# Patient Record
Sex: FEMALE | Race: WHITE | NOT HISPANIC OR LATINO | Employment: FULL TIME | URBAN - METROPOLITAN AREA
[De-identification: names, ages, dates, MRNs, and addresses within clinical notes are randomized per-mention and may not be internally consistent; named-entity substitution may affect disease eponyms.]

---

## 2019-12-17 ENCOUNTER — TELEPHONE (OUTPATIENT)
Dept: OTHER | Facility: OUTPATIENT CENTER | Age: 24
End: 2019-12-17

## 2019-12-17 NOTE — TELEPHONE ENCOUNTER
[facility] Telephone Intake    Date:  2019  Client Name:  Silvia Fritz  Preferred Name: Isaiah    MRN:  [id number]   :  1995       Age:  24 year old     Presenting Problem / Reason for Assessment   (Clinical History &Symptoms): ongoing support and gender therapy     Suggested Program:  TG    Length of time experiencing Symptoms: Since childhood     Seen Other Providers (if so, where):  M.D. : None   Therapist:  None currently but has seen therapist in the past   Psychiatrist:  None currently     Referral Source: Li Lion    Follow Up:    Insurance Benefits to be evaluated.  Note will be entered when validated.    Patient wishes to be contacted regarding Insurance benefits: Yes  Please Verify Registration    Please send Welcome Packet and document date sent.

## 2019-12-18 NOTE — TELEPHONE ENCOUNTER
.Per: [place]/ [place]  Copay$ 0   Ded$ 0; Met$ 0   Coins 20%    Out of Pocket Max$ 7,150 ; Met$ 688.75     Psych testing require auth (90921/23571)? no  Extended therapy require auth (21603)?  no    Exclusions:   Family Therapy (20944/67132)  NO    Marriage couple counseling  YES   Transgender/Gender Dysphoria no   CSB no   Sexual dysfunction no    Patient Contacted about benefits:  LEFT VOICEMAIL  Date contacted: 12/18/2019

## 2020-01-16 ENCOUNTER — OFFICE VISIT (OUTPATIENT)
Dept: OTHER | Facility: OUTPATIENT CENTER | Age: 25
End: 2020-01-16
Payer: COMMERCIAL

## 2020-01-16 DIAGNOSIS — F33.1 MAJOR DEPRESSIVE DISORDER, RECURRENT EPISODE, MODERATE (H): ICD-10-CM

## 2020-01-16 DIAGNOSIS — F64.0 GENDER DYSPHORIA IN ADOLESCENT AND ADULT: Primary | ICD-10-CM

## 2020-01-16 DIAGNOSIS — F84.0 AUTISM SPECTRUM: ICD-10-CM
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
## 2020-01-28 ENCOUNTER — OFFICE VISIT (OUTPATIENT)
Dept: OTHER | Facility: OUTPATIENT CENTER | Age: 25
End: 2020-01-28
Payer: COMMERCIAL

## 2020-01-28 DIAGNOSIS — F64.0 GENDER DYSPHORIA IN ADOLESCENT AND ADULT: Primary | ICD-10-CM

## 2020-01-28 ASSESSMENT — ANXIETY QUESTIONNAIRES
6. BECOMING EASILY ANNOYED OR IRRITABLE: SEVERAL DAYS
1. FEELING NERVOUS, ANXIOUS, OR ON EDGE: SEVERAL DAYS
3. WORRYING TOO MUCH ABOUT DIFFERENT THINGS: SEVERAL DAYS
7. FEELING AFRAID AS IF SOMETHING AWFUL MIGHT HAPPEN: NOT AT ALL
GAD7 TOTAL SCORE: 4
2. NOT BEING ABLE TO STOP OR CONTROL WORRYING: NOT AT ALL
5. BEING SO RESTLESS THAT IT IS HARD TO SIT STILL: SEVERAL DAYS

## 2020-01-28 ASSESSMENT — PATIENT HEALTH QUESTIONNAIRE - PHQ9: 5. POOR APPETITE OR OVEREATING: NOT AT ALL

## 2020-01-28 NOTE — PROGRESS NOTES
[facility] -  Case Progress Note    Date of Service: 20   Name: Silvia Fritz  Preferred name: Isaiah; he/him  : 1995  Medical Record Number: [id number]  Treating Provider: Saira Whitmore, PhD - Postdoctoral Fellow   Type of Session: Individual  Present in Session: Client   Number of Minutes:  55    Current Symptoms/Status:  Incongruence between experienced/expressed gender and birth assigned sex, lasting longer than six months and causing clinically significant distress, with endorsement of the following symptoms:   - marked incongruence between experience/expressed gender (male) and primary and secondary sex characteristics  - strong desire to be rid of birth assigned sex characteristics  - strong desire for secondary sex characteristics of gender other than birth assigned sex (male)  - strong desire to be read and treated as gender other than birth assigned sex (male)  - strong conviction that his feelings/experiences are typical of gender other than birth assigned sex      History of recurrent major depression, with current endorsement of symptoms in the mildly depressed range  History of Autism Spectrum Disorder, diagnosed at 15.        Progress Toward Treatment Goals:   Collaborative identification of treatment goals     Intervention: Modality and Description:  Collaborative approach to treatment planning and identification of goals. Isaiah was in agreement with diagnoses given based on assessment. He was able to clearly articulate his goals of first working with family towards improved communication around gender and support of his transition, and subsequently moving toward consultation with Dr. Cruz.   Primary interventions were psychoeducation, CBT, and supportive therapy towards Isaiah's gender goals. Clinician offered initial education on testosterone therapy, and process of medical consult with sexual medicine. Isaiah identified lack of family support as a significant  barrier, and one that he wishes to address prior to medical intervention. Isaiah further described family dynamics, and his perception of parental support/non-support. Clinician offered family session, and Isaiah is in agreement, with goals of 1) providing resources and education to parents, and 2) communicating his needs to parents about how to best support him. Isaiah has some concerns about coming out at work. Clinician shared resources through [facility] and [facility]. Isaiah was responsive to intervention.    Assignment:  Talk to parents about family session    DSM-5 Diagnoses:  302.85 - Gender Dysphoria in Adolescents and Adults    Plan/Need for Future Services:  Return for therapy in two weeks to treat diagnosed problems.  Next session with family if possible, per client preference and family availability.      Saira Whitmore, PhD - Postdoctoral Fellow

## 2020-01-29 NOTE — PROGRESS NOTES
I did not personally see the patient.  I reviewed and agree with the assessment and plan as documented in this note.     Stacia Morales PsyD, LP

## 2020-02-06 ASSESSMENT — PATIENT HEALTH QUESTIONNAIRE - PHQ9: SUM OF ALL RESPONSES TO PHQ QUESTIONS 1-9: 5

## 2020-02-07 ASSESSMENT — ANXIETY QUESTIONNAIRES: GAD7 TOTAL SCORE: 4

## 2020-02-11 ENCOUNTER — OFFICE VISIT (OUTPATIENT)
Dept: OTHER | Facility: OUTPATIENT CENTER | Age: 25
End: 2020-02-11
Payer: COMMERCIAL

## 2020-02-11 DIAGNOSIS — F64.0 GENDER DYSPHORIA IN ADOLESCENT AND ADULT: Primary | ICD-10-CM

## 2020-02-14 NOTE — PROGRESS NOTES
"[facility] -  Case Progress Note    Date of Service: 20   Name: Silvia Fritz  Preferred name: Isaiah - he/him  : 1995  Medical Record Number: [id number]  Treating Provider: Saira Whitmore, PhD - Postdoctoral Fellow  Type of Session: Family with client present  Present in Session: Isaiah + parents   Number of Minutes:  55    Current Symptoms/Status:  Incongruence between experienced/expressed gender and birth assigned sex, lasting longer than six months and causing clinically significant distress, with endorsement of the following symptoms:   - marked incongruence between experience/expressed gender (male) and primary and secondary sex characteristics  - strong desire to be rid of birth assigned sex characteristics  - strong desire for secondary sex characteristics of gender other than birth assigned sex (male)  - strong desire to be read and treated as gender other than birth assigned sex (male)  - strong conviction that his feelings/experiences are typical of gender other than birth assigned sex      History of recurrent major depression, with current endorsement of symptoms in the mildly depressed range  History of Autism Spectrum Disorder, diagnosed at 15.     Progress Toward Treatment Goals:   Positive communication with parents about gender and coming out process     Intervention: Modality and Description:  Primary intervention was supportive family psychotherapy and psychoeducation. Began with Isaiah individually to clarify goals of session. Isaiah requested that clinician to tell parents \"what they are doing wrong.\" Isaiah holds significant anger towards parents in their response to his coming out. Reframed goal as helping Isaiah voice what he needs so parents can understand how to be supportive.   Parents shared their difficulty knowing how to discuss gender with Isaiah, noting that they \"always seem to be in the wrong.\"  They struggle to know how to address Isaiah so as to not " "inadvertently out him, as he is not out to all family members. They stated that they \"need Isaiah to come out to everyone.\" Provided education to parents on considerations in coming out and reinforced that Isaiah has approached his coming out in a way that feels safe for him. He is working towards full social transition. Re-framed parents' behavior as coming from a place of concern (e.g. not wanting to out him against his wishes). Facilitated parents and Isaiah coming to mutual understanding of how Isaiah wants to be addressed in these situations. Parents discussed efforts to seek support through PFLAG and online.     Response to Intervention:  Isaiah and parents reported that being able to come to an understanding of name/prounoun use was helpful. Appears that Isaiah struggles with some difficulty in flexible/nuanced thinking, and parents are demanding this of him. This tends to be a source of conflict.    Assignment:  N/A    DSM-5 Diagnoses:  302.85 Gender Dysphoria in Adolescents and Adults     296.32 - Major Depressive Disorder - recurrent, moderate - by history  299.00 - Autism Spectrum Disorder - by history    Plan/Need for Future Services:  Return for therapy in two weeks to treat diagnosed problems.     Saira Whitmore, PhD - Postdoctoral Fellow    "

## 2020-02-26 ENCOUNTER — OFFICE VISIT (OUTPATIENT)
Dept: OTHER | Facility: OUTPATIENT CENTER | Age: 25
End: 2020-02-26
Payer: COMMERCIAL

## 2020-02-26 DIAGNOSIS — F64.0 GENDER DYSPHORIA IN ADOLESCENT AND ADULT: Primary | ICD-10-CM

## 2020-02-27 NOTE — PROGRESS NOTES
[facility] -  Case Progress Note    Date of Service: 20   Name: Silvia Fritz  Preferred name: Isaiah - he/him  : 1995  Medical Record Number: [id number]  Treating Provider: Saira Whitmore, PhD - Postdoctoral Fellow  Type of Session: Individual  Present in Session: Isaiah  Number of Minutes:  55    Current Symptoms/Status:  Incongruence between experienced/expressed gender and birth assigned sex, lasting longer than six months and causing clinically significant distress, with endorsement of the following symptoms:   - marked incongruence between experience/expressed gender (male) and primary and secondary sex characteristics  - strong desire to be rid of birth assigned sex characteristics  - strong desire for secondary sex characteristics of gender other than birth assigned sex (male)  - strong desire to be read and treated as gender other than birth assigned sex (male)  - strong conviction that his feelings/experiences are typical of gender other than birth assigned sex      History of recurrent major depression, with current endorsement of symptoms in the mildly depressed range  History of Autism Spectrum Disorder, diagnosed at 15.     Progress Toward Treatment Goals:   Positive communication with parents about gender and coming out process     Intervention: Modality and Description:  Primary intervention was cognitive-behavioral therapy and psychoeducation towards gender goals. Discussed session with family. Isaiah reported no contact with parents since and thus is unsure of session outcome. He reported satisfaction with topics discussed with parents. Discussed Isaiah's experience of his ASD symptoms and how it influences the way in which he relates to others. Discussed this issue as it presented during family session.   Isaiah stated that he would like to begin working towards HRT consult. Reviewed psychoeducational information on masculinizing hormone therapy. Isaiah stated that he  is not ready to consult with Dr. Cruz yet, as he would like to address process of coming out in the workplace first.   Isaiah was engaged and responsive to intervention.     Assignment:  Read information on HRT and prepare questions    DSM-5 Diagnoses:  302.85 Gender Dysphoria in Adolescents and Adults     296.32 - Major Depressive Disorder - recurrent, moderate - by history  299.00 - Autism Spectrum Disorder - by history    Plan/Need for Future Services:  Return for therapy in two weeks to treat diagnosed problems.     Saira Whitmore, PhD - Postdoctoral Fellow

## 2020-03-11 ENCOUNTER — HEALTH MAINTENANCE LETTER (OUTPATIENT)
Age: 25
End: 2020-03-11

## 2020-03-30 ENCOUNTER — VIRTUAL VISIT (OUTPATIENT)
Dept: OTHER | Facility: OUTPATIENT CENTER | Age: 25
End: 2020-03-30
Payer: COMMERCIAL

## 2020-03-30 DIAGNOSIS — F64.0 GENDER DYSPHORIA IN ADOLESCENT AND ADULT: Primary | ICD-10-CM

## 2020-03-30 NOTE — PROGRESS NOTES
"[facility] -  Case Progress Note    Date of Service: 3/30/20   Name: Silvia Fritz  Preferred name: Isaiah - he/roxana  : 1995  Medical Record Number: [id number]  Treating Provider: Saira Whitmore, PhD - Postdoctoral Fellow  Type of Session: Individual  Present in Session: Isaiah  Number of Minutes:  55  Service provided by phone due to COVID-19 pandemic and [facility] health directives.   The patient has been notified of following:   \"We have found that certain health care needs can be provided without the need for a face to face visit.  This service lets us provide the care you need with a phone conversation. I will have full access to your [facility] medical record during this entire phone call.   I will be taking notes for your medical record. Since this is like an office visit, we will bill your insurance company for this service. There are potential benefits and risks of telephone visits (e.g. limits to patient confidentiality) that differ from in-person visits.?  Confidentiality still applies for telephone services, and nobody will record the visit.  It is important to be in a quiet, private space that is free of distractions (including cell phone or other devices) during the visit.??If during the course of the call I believe a telephone visit is not appropriate, you will not be charged for this service\"  Consent has been obtained for this service by care team member: Yes  Confirmed that patient is in secure and private location - patient's home in private space.     Current Symptoms/Status:  Incongruence between experienced/expressed gender (male) and birth assigned sex (female), lasting longer than six months and causing clinically significant distress, with endorsement of the following symptoms:   - marked incongruence between experience/expressed gender (male) and primary and secondary sex characteristics  - strong desire to be rid of birth assigned sex " "characteristics  - strong desire for secondary sex characteristics of gender other than birth assigned sex (male)  - strong desire to be read and treated as gender other than birth assigned sex (male)  - strong conviction that his feelings/experiences are typical of gender other than birth assigned sex      History of recurrent major depression, with current endorsement of symptoms in the mildly depressed range  History of Autism Spectrum Disorder, diagnosed at 15.     Progress Toward Treatment Goals:   Positive communication with parents about gender and coming out process     Intervention: Modality and Description:  Primary intervention was supportive therapy and psychoeducation towards gender goals.   Isaiah shared that he is generally doing okay. He will not be re-starting work in March as planned and is eligible for unemployment. He remains at home with parents who are working from home. Reported that mom is using preferred name and pronouns at home, and dad is \"mostly fine,\" but still makes errors. Isaiah has decided that he wants to come out to the two family members he had concerns about (paternal grandfather and cousin). Would like support of his parents in this, and does not know how to discuss. Agreed that Isaiah will invite parents to next session so that we can develop plan together. Parents are in support of Isaiah coming out to family.  Isaiah had concerns about coming out at work. Provided psychoeducation on coming out in the workplace and referred to resources.  Isaiah was engaged and responsive to intervention.     Assignment:  Talk to parents about joining next session.    DSM-5 Diagnoses:  302.85 Gender Dysphoria in Adolescents and Adults  296.32 - Major Depressive Disorder - recurrent, moderate   299.00 - Autism Spectrum Disorder - by history    Plan/Need for Future Services:  Return for therapy in two weeks to treat diagnosed problems.     Saira Whitmore, PhD - Postdoctoral Fellow    "

## 2020-04-15 ENCOUNTER — VIRTUAL VISIT (OUTPATIENT)
Dept: OTHER | Facility: OUTPATIENT CENTER | Age: 25
End: 2020-04-15
Payer: COMMERCIAL

## 2020-04-15 DIAGNOSIS — F64.0 GENDER DYSPHORIA IN ADOLESCENT AND ADULT: Primary | ICD-10-CM

## 2020-04-16 NOTE — PROGRESS NOTES
[facility] -  Case Progress Note  Date of Service: 4/15/20   Name: Silvia Fritz  Preferred name: Isaiah - he/roxana  : 1995  Medical Record Number: [id number]  Treating Provider: Saira Whitmore, PhD - Postdoctoral Fellow  Type of Session: Individual  Present in Session: Isaiah  Number of Minutes:  55    Telemedicine Visit: The patient's condition can be safely assessed and treated via synchronous audio and visual telemedicine encounter.      Reason for Telemedicine Visit: Service provided by phone due to COVID-19 pandemic and [facility] health directives.     Originating Site (Patient Location): Patient's home    Distant Site (Provider Location): Provider Remote Setting    Consent:  The patient/guardian has verbally consented to: the potential risks and benefits of telemedicine (video visit) versus in person care; bill my insurance or make self-payment for services provided; and responsibility for payment of non-covered services.     Mode of Communication:  Video Conference via [employer]    As the provider I attest to compliance with applicable laws and regulations related to telemedicine.     Current Symptoms/Status:  Incongruence between experienced/expressed gender (male) and birth assigned sex (female), lasting longer than six months and causing clinically significant distress, with endorsement of the following symptoms:   - marked incongruence between experience/expressed gender (male) and primary and secondary sex characteristics  - strong desire to be rid of birth assigned sex characteristics  - strong desire for secondary sex characteristics of gender other than birth assigned sex (male)  - strong desire to be read and treated as gender other than birth assigned sex (male)  - strong conviction that his feelings/experiences are typical of gender other than birth assigned sex      History of recurrent major depression, with current endorsement of symptoms in the mildly  depressed range  History of Autism Spectrum Disorder, diagnosed at 15.     Progress Toward Treatment Goals:   Positive communication with parents about gender and coming out process   Isaiah feels ready to come out to grandfather     A referral is being made to Dr. Cruz for HRT evaluation. Goals and values  surrounding fertility, and the potential of impact of hormone therapy on fertility were  explored. Coexisting psychological or social problems that could interfere with treatment  have been addressed, such that the client s situation and functioning are stable enough  to start treatment. Client is able to communicate their gender identity, goals for hormone  therapy, and goals have been consistent. The client has sufficient mental capacity to  estimate the consequences, risks and benefits of hormone therapy and give informed  consent.    Intervention: Modality and Description:  Primary intervention was supportive therapy and psychoeducation towards gender goals.   Isaiah shared that he is generally okay but struggling with boredom. Sleeping more than usual and cycle is disrupted - sleeping 1am-1pm which is longer than usual. Denied increase in other depressive symptoms, notes sleep increase is a function of boredom. Isaiah filling his daytime by walking neighborhood dogs, grocery shopping for family and grandparents, and playing videogames. Filed for unemployment a few weeks ago and is getting enough money each week to cover necessary expenses.     Isaiah shared that he is not ready today for family session about coming out to grandfather. Wants to do it soon, but wants to feel more supported by parents first. Wants to hear parents validate his fears about coming out and going through the world as a transgender person. Discussed option of Isaiah writing this out on his own so he has clarity about what's important for parents to know. Began discussing Isaiah's expectations around coming out to grandfather.  "Doesn't have a sense of what to expect (doesn't know grandpa well), and will appreciate talking with parents about how they can help support him. Isaiah fears that if he comes out to grandpa, grandpa's wife's family will all find out. Explored Isaiah's anxieties about slowly having less control over how/when people on the periphery of his life \"find out.\" Isaiah doesn't want this to happen until he \"passes.\"   Isaiah was engaged and responsive to intervention.     Assignment:  Write out important parts of trans identity/experience for parents to know    DSM-5 Diagnoses:  302.85 Gender Dysphoria in Adolescents and Adults   296.32 - Major Depressive Disorder - recurrent, moderate   299.00 - Autism Spectrum Disorder - by history    Plan/Need for Future Services:  Return for therapy in two weeks to treat diagnosed problems.   Refer to Dr. Cruz for HRT consult    Saira Whitmore, PhD - Postdoctoral Fellow    "

## 2020-04-17 NOTE — PROGRESS NOTES
"I did not personally see the patient.  I reviewed and agree with the assessment and plan as documented in this note.     Annel \"Jesse\" KINA Ramos, Ph.D., Professor  [place] Licensed Psychologist  [place] Licensed Marriage & Family Therapist & State Approved Supervisor  "

## 2020-04-28 ENCOUNTER — VIRTUAL VISIT (OUTPATIENT)
Dept: OTHER | Facility: OUTPATIENT CENTER | Age: 25
End: 2020-04-28
Payer: COMMERCIAL

## 2020-04-28 DIAGNOSIS — F84.0 AUTISM SPECTRUM: ICD-10-CM

## 2020-04-28 DIAGNOSIS — F64.0 GENDER DYSPHORIA IN ADOLESCENT AND ADULT: Primary | ICD-10-CM

## 2020-04-28 NOTE — PROGRESS NOTES
[facility] -  Case Progress Note  Date of Service: 20   Name: Silvia Fritz  Preferred name: Isaiah - he/roxana  : 1995  Medical Record Number: [id number]  Treating Provider: Saira Whitmore, PhD - Postdoctoral Fellow  Type of Session: Individual  Present in Session: Isaiah  Number of Minutes:  50    Video Visit Start Time: 14:00  Video Visit End Time: 14:50    Telemedicine Visit: The patient's condition can be safely assessed and treated via synchronous audio and visual telemedicine encounter.      Reason for Telemedicine Visit: Service provided by phone due to COVID-19 pandemic and [facility] health directives.     Originating Site (Patient Location): Patient's home    Distant Site (Provider Location): Provider Remote Setting    Consent:  The patient/guardian has verbally consented to: the potential risks and benefits of telemedicine (video visit) versus in person care; bill my insurance or make self-payment for services provided; and responsibility for payment of non-covered services.     Mode of Communication:  Video Conference via [email or web address]    As the provider I attest to compliance with applicable laws and regulations related to telemedicine.     Current Symptoms/Status:  Incongruence between experienced/expressed gender (male) and birth assigned sex (female), lasting longer than six months and causing clinically significant distress, with endorsement of the following symptoms:   - marked incongruence between experience/expressed gender (male) and primary and secondary sex characteristics  - strong desire to be rid of birth assigned sex characteristics  - strong desire for secondary sex characteristics of gender other than birth assigned sex (male)  - strong desire to be read and treated as gender other than birth assigned sex (male)  - strong conviction that his feelings/experiences are typical of gender other than birth assigned sex      History of recurrent major  "depression, with current endorsement of symptoms in the mildly depressed range  History of Autism Spectrum Disorder, diagnosed at 15.     Progress Toward Treatment Goals:   Positive communication with parents about gender and coming out process   Isaiah feels ready to come out to family and friends  Referral made to Dr Cruz - Isaiah completing intake paperwork with mother, is working with FLAVIO Leiva on this.    Intervention: Modality and Description:  Primary intervention was supportive therapy and psychoeducation towards gender goals.   Session focused on supporting Isaiah in developing a plan for coming out to family. Isaiah determined that he feels most comfortable sharing through a letter. Supported Isaiah in deciding what he wants to say in a coming out letter, and offered sample language. Began working with Isaiah on managing expectations around others' response. He acknowledges that he struggles with rigid thinking around expectations, (e.g. \"if people care about me, they won't ever mess it up). Validated the importance of having identity affirmed, while also normalizing a learning curve to adjust to different name/pronoun. Began discussing boundaries around what questions Isaiah feels comfortable taking from others. Encouraged Isaiah to search online for others' experiences of coming out. Isaiah will draft a letter and we will review next session.   Isaiah was engaged and responsive to intervention.     Assignment:  Write out important parts of trans identity/experience for parents to know  Write coming out letter.    DSM-5 Diagnoses:  302.85 Gender Dysphoria in Adolescents and Adults   296.32 - Major Depressive Disorder - recurrent, moderate   299.00 - Autism Spectrum Disorder - by history    Plan/Need for Future Services:  Return for therapy in two weeks to treat diagnosed problems.     Saira Whitmore, PhD - Postdoctoral Fellow    "

## 2020-05-06 ENCOUNTER — TELEPHONE (OUTPATIENT)
Dept: OTHER | Facility: OUTPATIENT CENTER | Age: 25
End: 2020-05-06

## 2020-05-06 NOTE — TELEPHONE ENCOUNTER
----- Message from Saira Whitmore, PhD sent at 5/5/2020 12:33 PM CDT -----  Remi Willson,    Oh sorry, I did e-consult for the  and 'cc chart for Dr. Cruz. I went back to the original encounter for 4/15 and added the e-consult for Dr. Cruz.     Thanks!  Saira  ----- Message -----  From: Anamika Leiva CMA  Sent: 5/5/2020  11:37 AM CDT  To: Saira Whitmore, PhD    Just need to place an e consult for Dr Cruz.  Let me know when done and I will reach out to them.    Thanks,    Anamika  ----- Message -----  From: Saira Whitmore, PhD  Sent: 5/4/2020   4:04 PM CDT  To: FLAVIO Lewis!    You know, I've never done that before. I've just cc'd charts and sent e-consults to the desk. So, when I wrap up the chart and place the order for psychotherapy, I should also include an order for Dr. Cruz? What is the specific order I should place?     Thanks!   Saira  ----- Message -----  From: Anamika Leiva CMA  Sent: 5/4/2020   3:05 PM CDT  To: Saira Whitmore, PhD    Gennaro Arias,    Could you please place an order for Isaiah to see Dr Cruz.    Thanks a artich    Anamika

## 2020-05-11 ENCOUNTER — VIRTUAL VISIT (OUTPATIENT)
Dept: OTHER | Facility: OUTPATIENT CENTER | Age: 25
End: 2020-05-11
Payer: COMMERCIAL

## 2020-05-11 DIAGNOSIS — F64.0 GENDER DYSPHORIA IN ADOLESCENT AND ADULT: Primary | ICD-10-CM

## 2020-05-11 DIAGNOSIS — F84.0 AUTISM SPECTRUM: ICD-10-CM

## 2020-05-11 RX ORDER — BUPROPION HYDROCHLORIDE 150 MG/1
150 TABLET ORAL
COMMUNITY
Start: 2020-03-31 | End: 2021-01-22

## 2020-05-11 NOTE — PROGRESS NOTES
[facility] -  Case Progress Note  Date of Service: 20   Name: Silvia Fritz  Preferred name: Isaiah - he/roxana  : 1995  Medical Record Number: [id number]  Treating Provider: Saira Whitmore, PhD - Postdoctoral Fellow  Type of Session: Individual  Present in Session: Isaiah  Number of Minutes:  60    Video Visit Start Time: 13:00  Video Visit End Time: 14:00  Telemedicine Visit: The patient's condition can be safely assessed and treated via synchronous audio and visual telemedicine encounter.      Reason for Telemedicine Visit: Service provided by phone due to COVID-19 pandemic and [facility] health directives.     Originating Site (Patient Location): Patient's home    Distant Site (Provider Location): Provider Remote Setting    Consent:  The patient/guardian has verbally consented to: the potential risks and benefits of telemedicine (video visit) versus in person care; bill my insurance or make self-payment for services provided; and responsibility for payment of non-covered services.     Mode of Communication:  Video Conference via [employer]    As the provider I attest to compliance with applicable laws and regulations related to telemedicine.     Current Symptoms/Status:  Incongruence between experienced/expressed gender (male) and birth assigned sex (female), lasting longer than six months and causing clinically significant distress, with endorsement of the following symptoms:   - marked incongruence between experience/expressed gender (male) and primary and secondary sex characteristics  - strong desire to be rid of birth assigned sex characteristics  - strong desire for secondary sex characteristics of gender other than birth assigned sex (male)  - strong desire to be read and treated as gender other than birth assigned sex (male)  - strong conviction that his feelings/experiences are typical of gender other than birth assigned sex      History of recurrent major  "depression, with current endorsement of symptoms in the mildly depressed range  History of Autism Spectrum Disorder, diagnosed at 15.     Progress Toward Treatment Goals:   Positive communication with parents about gender and coming out process   Isaiah feels ready to come out to family and friends  Referral made to Dr Cruz - Isaiah completing intake paperwork with mother, is working with FLAVIO Leiva on this.    Intervention: Modality and Description:  Primary intervention was supportive therapy and psychoeducation towards gender goals.   Isaiah shared his coming out letter to family. Intends to send this within the coming weeks to family and neighbors. Discussed Isaiah's concerns about coming out to grandparents, and feeling that \"parents are doing 180s\" by telling Isaiah that \"it's his story to tell\" while also saying that they want to talk with their parents about it. Challenged Isaiah to do some perspective-taking to understand where parents are coming from (e.g. possibly own anxieties/concerns about grandparent response that they do not want to put on Isaiah). Isaiah shared continued anger with parents for what he perceives as non-support.   Isaiah disclosed at the end of session that father was present in the room for duration of session, instructed not to say anything. Isaiah shared that he intended this as \"an opportunity for father to see what support looks like.\" Invited father to join for last minutes of session - father clarified that he wants to support Isaiah, is proud of the letter he wrote, and feels frustrated and undeserving of the anger that Isaiah shows toward them. Shared that he is introducing his family to new neighbors and does not know how to introduce Isaiah, and feels like he can't ask. Father asked this writer if \"we still need to follow Isaiah's timeline.\" Clarified to father that Isaiah does determine when he comes out, but suggested to Isaiah that father needs to be able to ask " questions about Isaiah's preferences without fear. Father shared that they are still looking for parent support resources.  Isaiah was engaged and responsive to intervention while alone, and disengaged with father joined.    Assignment:  Send letter when ready  Attend HRT consult with Dr. Cruz    DSM-5 Diagnoses:  302.85 Gender Dysphoria in Adolescents and Adults   296.32 - Major Depressive Disorder - recurrent, moderate   299.00 - Autism Spectrum Disorder - by history    Plan/Need for Future Services:  Return for therapy in two weeks to treat diagnosed problem. Discuss breach of privacy that occurred in session today, privately with Isaiah and then with family. Refer parents to a different clinic therapist for individualized support.     Saira Whitmore, PhD - Postdoctoral Fellow

## 2020-05-12 ENCOUNTER — VIRTUAL VISIT (OUTPATIENT)
Dept: OTHER | Facility: OUTPATIENT CENTER | Age: 25
End: 2020-05-12
Payer: COMMERCIAL

## 2020-05-12 DIAGNOSIS — F64.0 GENDER DYSPHORIA IN ADOLESCENT AND ADULT: Primary | ICD-10-CM

## 2020-05-12 NOTE — NURSING NOTE
Call to patient and given resource to address a My Chart issues.  Also, given the [facility] lab number.      Anamika LeivaCMA

## 2020-05-12 NOTE — Clinical Note
Patient having trouble logging in to [employer]--can have  help. Please mail or somehow get information to do labs at [facility] to patient.

## 2020-05-12 NOTE — PROGRESS NOTES
"Silvia Fritz is a 25 year old female who is being evaluated via a billable video visit.      The patient has been notified of following:     \"This video visit will be conducted via a call between you and your physician/provider. We have found that certain health care needs can be provided without the need for an in-person physical exam.  This service lets us provide the care you need with a video conversation.  If a prescription is necessary we can send it directly to your pharmacy.  If lab work is needed we can place an order for that and you can then stop by our lab to have the test done at a later time.    Video visits are billed at different rates depending on your insurance coverage.  Please reach out to your insurance provider with any questions.    If during the course of the call the physician/provider feels a video visit is not appropriate, you will not be charged for this service.\"    Patient has given verbal consent for Video visit? Yes  Phone was used for audio portion of visit due to technical difficulties      Patient would like the video invitation sent by: Send to e-mail at: [email or web address]    This is a transgender medical consultation at the request of Dr. Saira Whitmore.      IDENTIFICATION:  A 25-year-old transmasculine patient.      CHIEF CONCERN:  Hormone therapy.      HISTORY OF PRESENT ILLNESS:  The patient has a long-standing history of gender dysphoria.  The diagnostic assessment of Dr. Whitmore was reviewed.  The patient's goal for hormone therapy is to achieve a more binary set of physical responses to testosterone.      CURRENT MEDICAL PROBLEMS:  Anxiety, OCD, ADHD and Asperger according to the patient's description.        ALLERGIES:  No known drug allergies.      MEDICATIONS:  Only bupropion.      FAMILY MEDICAL HISTORY:  Mother with depression.  Father with migraines.  Sister with type 1 diabetes and migraines.  Maternal grandfather with heart disease,  at age 49, also " diabetes and stroke.  Cousin with bipolar disorder.  Maternal grandmother with thyroid.  Remainder of family history is unremarkable.      SOCIAL HISTORY:  The patient eats a standard diet, including dairy despite some lactose intolerance.  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  Ends up walking approximately 10 miles a day during the course of their workday.  Currently living at home.  No children.  Has never smoked.  No alcohol use, no recreational substances.  Is vaccinated against hepatitis B.      SEXUAL HISTORY:  The patient has regular menses lasting 5-7 days with normal flow and little cramping.  Has never been sexually active with a partner.      REVIEW OF SYSTEMS:  Notable only for above anxiety and depression symptoms.  Remainder of 12 point review of systems is otherwise negative.      PHYSICAL EXAMINATION:  The patient is alert, in no apparent distress.  Speech with regular rate and rhythm.  Mood appears euthymic.  No psychomotor changes.  No respiratory disturbance.  Reviewed chart and discussed with the patient about the need for physical exam prior to starting hormone therapy.  The patient has no primary care provider and has not had a physical exam in 2 years.  He only sees Psychiatry.  Last visit with Psychiatry was on 01/07/2020.  Reviewed this visit.  At this visit, blood pressure was 105/48.  Weight was 109 pounds 8 ounces.  Height 5 feet 3 inches for a BMI of 19.4.  No other physical exam was performed other than psychiatric exam.     A/P  1. Gender dysphoria  2. Anxiety, OCD per history    The masculinizing effects of hormone therapy were discussed at length, along the variability of outcomes and general timeframe for expected masculinizing changes. Permanent vs semi-reversible changes were reviewed.   Reviewed that testosterone is an FDA controlled substance and that all prescriptions must be managed accordingly.  Patient was counseled regarding the  potential risks and side effects of masculinizing therapy including:  - reduced fertility, reproductive options, need for ongoing contraception (if indicated) due to effects on developing fetus  -changes to sexual function including clitoral growth  -potential for weight gain, elevated cholesterol, and other indirect metabolic effects on blood pressure or glucose  -increased risk of erythrocytosis and rare risks associated with this including thrombosis   --changes to liver function tests  --mood changes, long term cardiovascular risks, potential undetermined cancer risks.    I discussed the possible risk of temporary or irreversible impairment of the fertility with the patient today. He demonstrated a complete understanding of the fertility preservation options and declined fertility preservation.     Discussed with patient that would only be able to start other than limited low dose hormones without physical exam by provider as last vitals over 1 year old, and no  Basic exam in over 2 years. Will refer to [facility] for assistance with establishing care with a PCP  Baseline labs: LFT's, lipids, glucose, can be done at [facility] or [facility]    Discussed that we can start low dose topical testosterone once labs are complete (if normal), able to change to injections once have exam (by me or PCP), and can meet in person to teach injection technique.    Follow-up when ready for hormone start.     Video-Visit Details    Type of service:  Video Visit    Video Start Time: 3:00  Video End Time: 3:37 PM    Originating Location (pt. Location): Home    Distant Location (provider location):  [place]     Platform used for Video Visit: Da Cruz MD

## 2020-05-14 ENCOUNTER — TELEPHONE (OUTPATIENT)
Dept: PLASTIC SURGERY | Facility: CLINIC | Age: 25
End: 2020-05-14

## 2020-05-27 ENCOUNTER — VIRTUAL VISIT (OUTPATIENT)
Dept: OTHER | Facility: OUTPATIENT CENTER | Age: 25
End: 2020-05-27
Payer: COMMERCIAL

## 2020-05-27 DIAGNOSIS — F64.0 GENDER DYSPHORIA IN ADOLESCENT AND ADULT: Primary | ICD-10-CM

## 2020-05-27 NOTE — PROGRESS NOTES
"[facility] -  Case Progress Note    Date of Service: 20   Name: Silvia Fritz  Preferred name: Isaiah - he/roxana  : 1995  Medical Record Number: [id number]  Treating Provider: Saira Whitmore, PhD - Postdoctoral Fellow  Type of Session: Individual - telephone visit   Present in Session: Isaiah  Number of Minutes:  55    Health Maintenance Summary - Mental Health Treatment Plan     Patient has no health maintenance due at this time        Service provided by telephone due to COVID-19 pandemic and [facility] health directives. Telemedicine was attempted multiple times without success.     The following was reviewed with the patient.   \"We have found that certain health care needs can be provided without the need for a face to face visit.  This service lets us provide the care you need with a phone conversation. I will have full access to your [facility] medical record during this entire phone call.   I will be taking notes for your medical record. Since this is like an office visit, we will bill your insurance company for this service. There are potential benefits and risks of telephone visits (e.g. limits to patient confidentiality) that differ from in-person visits.?  Confidentiality still applies for telephone services, and nobody will record the visit.  It is important to be in a quiet, private space that is free of distractions (including cell phone or other devices) during the visit.??If during the course of the call I believe a telephone visit is not appropriate, you will not be charged for this service\"    Consent has been obtained for this service by care team member: Yes    Current Symptoms/Status:  Incongruence between experienced/expressed gender (male) and birth assigned sex (female), lasting longer than six months and causing clinically significant distress, with endorsement of the following symptoms:   - marked incongruence between experience/expressed " "gender (male) and primary and secondary sex characteristics  - strong desire to be rid of birth assigned sex characteristics  - strong desire for secondary sex characteristics of gender other than birth assigned sex (male)  - strong desire to be read and treated as gender other than birth assigned sex (male)  - strong conviction that his feelings/experiences are typical of gender other than birth assigned sex      History of recurrent major depression, with current endorsement of symptoms in the mildly depressed range  History of Autism Spectrum Disorder, diagnosed at 15.     Progress Toward Treatment Goals:   5/27/20: Isaiah provided verbal consent for this writer to call parents Briana and Burt to share recommendation for their own therapist through [facility], Dr. Jessica Maria  Positive communication with parents about gender and coming out process   Isaiah feels ready to come out to family and friends  Referral made to Dr Nancy Colon completing intake paperwork with mother, is working with FLAVIO Leiva on this.    Intervention: Modality and Description:  Primary intervention was integrative psychotherapy towards gender and mental health goals.   Discussed issue with boundary violation last session. Isaiah shared that he understood that what he did \"was not okay.\" Guided Isaiah in perspective-taking of both myself and father from last session. Validated his desire to have father witness support, and discussed alternate ways of doing this. Provided feedback to Isaiah on his role in conflict with parents. Isaiah voiced understanding and some willingness to work on this.   Isaiah continues to prepare himself to send letter out to family. Shared concern at how grandfather may react, and concern that he will not have parents support.   Followed up on appointment with Dr. Cruz. Isaiah in search of primary care physician. Shared that parents do not want him to go to [facility] because it's \"not in a safe part of " "town.\" Supported Isaiah in his desire to make independent decisions about his healthcare.  Isaiah was engaged and responsive to intervention.    Interactive Complexity:  Communication difficulties present during current the psychiatric procedure included the need to manage maladaptive communication (related to e.g. high anxiety, high reactivity, repeated questions, or disagreement) among participants that complicates delivery of care. Session addressed boundary violation in prior session and required follow up with client's parents outside of session time.    Assignment:  Send letter when ready  Call [facility] for lab work    DSM-5 Diagnoses:  302.85 Gender Dysphoria in Adolescents and Adults   296.32 - Major Depressive Disorder - recurrent, moderate   299.00 - Autism Spectrum Disorder - by history    Plan/Need for Future Services:  Return for therapy in two weeks to treat diagnosed problem.    Saira Whitmore, PhD - Postdoctoral Fellow            "

## 2020-06-10 ENCOUNTER — VIRTUAL VISIT (OUTPATIENT)
Dept: OTHER | Facility: OUTPATIENT CENTER | Age: 25
End: 2020-06-10
Payer: COMMERCIAL

## 2020-06-10 DIAGNOSIS — F64.0 GENDER DYSPHORIA IN ADOLESCENT AND ADULT: Primary | ICD-10-CM

## 2020-06-24 ENCOUNTER — VIRTUAL VISIT (OUTPATIENT)
Dept: OTHER | Facility: OUTPATIENT CENTER | Age: 25
End: 2020-06-24
Payer: COMMERCIAL

## 2020-06-24 DIAGNOSIS — F33.1 MAJOR DEPRESSIVE DISORDER, RECURRENT EPISODE, MODERATE (H): ICD-10-CM

## 2020-06-24 DIAGNOSIS — F84.0 AUTISM SPECTRUM: ICD-10-CM

## 2020-06-24 DIAGNOSIS — F64.0 GENDER DYSPHORIA IN ADOLESCENT AND ADULT: Primary | ICD-10-CM

## 2020-06-24 NOTE — PROGRESS NOTES
[facility] -  Case Progress Note    Date of Service: 20   Name: Silvia Fritz  Preferred name: Isaiah - he/roxana  : 1995  Medical Record Number: [id number]  Treating Provider: Saira Whitmore, PhD - Postdoctoral Fellow  Type of Session: Individual   Present in Session: Isaiah  Number of Minutes:  55    Health Maintenance Summary - Mental Health Treatment Plan     Patient has no health maintenance due at this time        Video start time: 16:00  Video end time: 16:55    Telemedicine Visit: The patient's condition can be safely assessed and treated via synchronous audio and visual telemedicine encounter.      Reason for Telemedicine Visit: Service provided by telemedicine due to COVID-19 pandemic and [facility] directives.     Originating Site (Patient Location): Patient's home    Distant Site (Provider Location): Provider Remote Setting    Consent:  The patient has verbally consented to: the potential risks and benefits of telemedicine (video visit) versus in person care; bill my insurance or make self-payment for services provided; and responsibility for payment of non-covered services.     Mode of Communication:  Video Conference via [employer]    As the provider I attest to compliance with applicable laws and regulations related to telemedicine.    Current Symptoms/Status:  Incongruence between experienced/expressed gender (male) and birth assigned sex (female), lasting longer than six months and causing clinically significant distress, with endorsement of the following symptoms:   - marked incongruence between experience/expressed gender (male) and primary and secondary sex characteristics  - strong desire to be rid of birth assigned sex characteristics  - strong desire for secondary sex characteristics of gender other than birth assigned sex (male)  - strong desire to be read and treated as gender other than birth assigned sex (male)  - strong conviction that his  "feelings/experiences are typical of gender other than birth assigned sex      History of recurrent major depression, with current endorsement of symptoms in the mildly depressed range  History of Autism Spectrum Disorder, diagnosed at 15.     Progress Toward Treatment Goals:   Isaiah came out to family and friends via letter. Received positive feedback and support    Intervention: Modality and Description:  Primary intervention was integrative psychotherapy towards gender and mental health goals.   Isaiah sent his coming out letter to family and neighborhood last week. Reported receiving texts of support back from neighbors, aunt, family friends. Isaiah relieved that \"worst case scenario\" didn't happen. Relieved to finally be able to live as Isaiah.   Discussed continued goals. Isaiah still in need of primary care - referred to [facility]. Discussed option to transfer psychiatric care to this clinic so care is consolidated. Isaiah struggling with med regimen, is interested in second opinion.   Discussed working towards goal of independent living. Isaiah feels that this goal is \"completely out of reach,\" though does like the idea of independent living. Isaiah is willing to consider options for support in areas of socialization, job searching, etc. Clinician to consider referral to [facility] program in the future.   Isaiah was engaged and responsive to intervention.    Interactive Complexity:  None    Assignment:  Call [facility] for lab work    DSM-5 Diagnoses:  302.85 Gender Dysphoria in Adolescents and Adults   296.32 - Major Depressive Disorder - recurrent, moderate   299.00 - Autism Spectrum Disorder - by history    Plan/Need for Future Services:  Return for therapy in two weeks to treat diagnosed problem.    Saira Whitmore, PhD - Postdoctoral Fellow            "

## 2020-07-03 NOTE — PROGRESS NOTES
[facility] -  Case Progress Note    Date of Service: 6/10/20   Name: Silvia Fritz  Preferred name: Isaiah - he/roxana  : 1995  Medical Record Number: [id number]  Treating Provider: Saira Whitmore, PhD - Postdoctoral Fellow  Type of Session: Family with client present   Present in Session: father Burt Colon  Number of Minutes:  55    Health Maintenance Summary - Mental Health Treatment Plan     Patient has no health maintenance due at this time        Video start time: 15:00  Video end time: 15:55    Telemedicine Visit: The patient's condition can be safely assessed and treated via synchronous audio and visual telemedicine encounter.      Reason for Telemedicine Visit: Service provided by telemedicine due to COVID-19 pandemic and [facility] health directives.     Originating Site (Patient Location): Patient's home    Distant Site (Provider Location): Provider Remote Setting    Consent:  The patient/guardian has verbally consented to: the potential risks and benefits of telemedicine (video visit) versus in person care; bill my insurance or make self-payment for services provided; and responsibility for payment of non-covered services.     Mode of Communication:  Video Conference via [email or web address]    As the provider I attest to compliance with applicable laws and regulations related to telemedicine.    Current Symptoms/Status:  Incongruence between experienced/expressed gender (male) and birth assigned sex (female), lasting longer than six months and causing clinically significant distress, with endorsement of the following symptoms:   - marked incongruence between experience/expressed gender (male) and primary and secondary sex characteristics  - strong desire to be rid of birth assigned sex characteristics  - strong desire for secondary sex characteristics of gender other than birth assigned sex (male)  - strong desire to be read and treated as gender other than birth assigned sex  (male)  - strong conviction that his feelings/experiences are typical of gender other than birth assigned sex      History of recurrent major depression, with current endorsement of symptoms in the mildly depressed range  History of Autism Spectrum Disorder, diagnosed at 15.     Progress Toward Treatment Goals:   5/27/20: Isaiah provided verbal consent for this writer to call parents Briana and Burt to share recommendation for their own therapist through [facility], Dr. Jessica Maria  Positive communication with parents about gender and coming out process   Isaiah feels ready to come out to family and friends  Referral made to Dr Cruz - Isaiah completing intake paperwork with mother, is working with [facility] Anamika Leiva on this.    Intervention: Modality and Description:  Primary intervention was integrative psychotherapy towards gender and mental health goals.   Isaiah invited father to session for continued guidance on his plan to come out to family via letter. Isaiah ready to send letters out this week. Father planning to meet with grandfather for lunch to share in person - Isaiah ok with this plan. Family continues to struggle with Isaiah's expectations about how parents will handle non-support, particularly from sister. Father able to voice support and plan to 'step in' if anyone makes transphobic comments to Isaiah. Isaiah continues to be skeptical of father's support. Parents planning to meet with therapist on their own to discuss these issues further.  Isaiah and father were engaged and responsive to intervention.    Interactive Complexity:  None    Assignment:  Send letter when ready  Call [facility] for lab work    DSM-5 Diagnoses:  302.85 Gender Dysphoria in Adolescents and Adults   296.32 - Major Depressive Disorder - recurrent, moderate   299.00 - Autism Spectrum Disorder - by history    Plan/Need for Future Services:  Return for therapy in two weeks to treat diagnosed problem.    Saira Whitmore, PhD -  Postdoctoral Fellow

## 2020-07-08 ENCOUNTER — VIRTUAL VISIT (OUTPATIENT)
Dept: OTHER | Facility: OUTPATIENT CENTER | Age: 25
End: 2020-07-08
Payer: COMMERCIAL

## 2020-07-08 DIAGNOSIS — F64.0 GENDER DYSPHORIA IN ADOLESCENT AND ADULT: Primary | ICD-10-CM

## 2020-07-08 NOTE — PROGRESS NOTES
[facility] -  Case Progress Note    Date of Service: 20   Name: Silvia Fritz  Preferred name: Isaiah - he/roxana  : 1995  Medical Record Number: [id number]  Treating Provider: Saira Whitmore, PhD - Postdoctoral Fellow  Type of Session: Individual   Present in Session: Isaiah  Number of Minutes:  60    Health Maintenance Summary - Mental Health Treatment Plan     Patient has no health maintenance due at this time        Video start time: 17:00  Video end time: 18:00    Telemedicine Visit: The patient's condition can be safely assessed and treated via synchronous audio and visual telemedicine encounter.      Reason for Telemedicine Visit: Service provided by telemedicine due to COVID-19 pandemic and [facility] directives.     Originating Site (Patient Location): Patient's home    Distant Site (Provider Location): Provider Remote Setting    Consent:  The patient has verbally consented to: the potential risks and benefits of telemedicine (video visit) versus in person care; bill my insurance or make self-payment for services provided; and responsibility for payment of non-covered services.     Mode of Communication:  Video Conference via [employer]    As the provider I attest to compliance with applicable laws and regulations related to telemedicine.    Current Symptoms/Status:  Incongruence between experienced/expressed gender (male) and birth assigned sex (female), lasting longer than six months and causing clinically significant distress, with endorsement of the following symptoms:   - marked incongruence between experience/expressed gender (male) and primary and secondary sex characteristics  - strong desire to be rid of birth assigned sex characteristics  - strong desire for secondary sex characteristics of gender other than birth assigned sex (male)  - strong desire to be read and treated as gender other than birth assigned sex (male)  - strong conviction that his  feelings/experiences are typical of gender other than birth assigned sex      History of recurrent major depression, with current endorsement of symptoms in the mildly depressed range  History of Autism Spectrum Disorder, diagnosed at 15.     Progress Toward Treatment Goals:   Isaiah came out to family and friends via letter. Received positive feedback and support    Intervention: Modality and Description:  Primary intervention was integrative psychotherapy towards gender and mental health goals.   Isaiah doing generally well - struggling with boredom and trying to arrange house/ work. Will be off work at two usual part-time positions for the year (seasonal work at [employer]). Is not ready to consider applying for formal employment elsewhere. Began discussing job interests and skills. Discussed other hobbies and interests that Isaiah can engage in while at home.   Isaiah feels positively after coming out to family and friends. Affect was brighter this week. Has some nervousness about upcoming dinner with grandparents, uncertain about their response. Worked with Isaiah on strategies to handle difficult conversations and assert boundaries in a firm/kind manner. Challenged Isaiah on characterization of parents as uncaring/nonsupportive.   Isaiah interested in legal name change. Guided Isaiah through relevant [employer] [place] website sections, assigned this as homework between sessions.   Isaiah was engaged and responsive to intervention.    Interactive Complexity:  None    Assignment:  Explore [employer] [place] Website  Attend physical exam at [facility] and do labs. Scheduled w Dr. Cruz after this appt.    DSM-5 Diagnoses:  302.85 Gender Dysphoria in Adolescents and Adults   296.32 - Major Depressive Disorder - recurrent, moderate   299.00 - Autism Spectrum Disorder - by history    Plan/Need for Future Services:  Return for therapy in two weeks to treat diagnosed problem.    Saira Whitmore, PhD - Postdoctoral  Fellow

## 2020-07-21 ENCOUNTER — OFFICE VISIT (OUTPATIENT)
Dept: FAMILY MEDICINE | Facility: CLINIC | Age: 25
End: 2020-07-21
Payer: COMMERCIAL

## 2020-07-21 VITALS
DIASTOLIC BLOOD PRESSURE: 70 MMHG | BODY MASS INDEX: 17.69 KG/M2 | RESPIRATION RATE: 16 BRPM | WEIGHT: 106.2 LBS | TEMPERATURE: 98.4 F | HEART RATE: 85 BPM | HEIGHT: 65 IN | SYSTOLIC BLOOD PRESSURE: 103 MMHG | OXYGEN SATURATION: 97 %

## 2020-07-21 DIAGNOSIS — F64.9 GENDER DYSPHORIA: Primary | ICD-10-CM

## 2020-07-21 PROBLEM — F90.0 ADHD, PREDOMINANTLY INATTENTIVE TYPE: Status: ACTIVE | Noted: 2020-01-07

## 2020-07-21 PROBLEM — F41.9 ANXIETY: Status: ACTIVE | Noted: 2019-10-02

## 2020-07-21 PROBLEM — F33.1 MODERATE RECURRENT MAJOR DEPRESSION (H): Status: ACTIVE | Noted: 2019-10-02

## 2020-07-21 LAB
ALBUMIN SERPL-MCNC: 4.4 MG/DL (ref 3.8–5)
ALP SERPL-CCNC: 72.6 U/L (ref 31.7–110.5)
ALT SERPL-CCNC: 6.9 U/L (ref 0–45)
AST SERPL-CCNC: 11.4 U/L (ref 0–45)
BILIRUB SERPL-MCNC: 0.6 MG/DL (ref 0.2–1.3)
BUN SERPL-MCNC: 8.9 MG/DL (ref 7–19)
CALCIUM SERPL-MCNC: 9.4 MG/DL (ref 8.5–10.1)
CHLORIDE SERPLBLD-SCNC: 102.5 MMOL/L (ref 98–110)
CHOLEST SERPL-MCNC: 164 MG/DL (ref 0–200)
CHOLEST/HDLC SERPL: 3.8 {RATIO} (ref 0–5)
CO2 SERPL-SCNC: 25.3 MMOL/L (ref 20–32)
CREAT SERPL-MCNC: 0.7 MG/DL (ref 0.5–1)
GFR SERPL CREATININE-BSD FRML MDRD: >90 ML/MIN/1.7 M2
GLUCOSE SERPL-MCNC: 83.8 MG'DL (ref 70–99)
HCT VFR BLD AUTO: 42.6 % (ref 35–47)
HDLC SERPL-MCNC: 43.1 MG/DL
HEMOGLOBIN: 13 G/DL (ref 11.7–15.7)
LDLC SERPL CALC-MCNC: 107 MG/DL (ref 0–129)
MCH RBC QN AUTO: 31.1 PG (ref 26.5–35)
MCHC RBC AUTO-ENTMCNC: 30.5 G/DL (ref 32–36)
MCV RBC AUTO: 101.8 FL (ref 78–100)
PLATELET # BLD AUTO: 247 K/UL (ref 150–450)
POTASSIUM SERPL-SCNC: 4.3 MMOL/L (ref 3.3–4.5)
PROT SERPL-MCNC: 7.5 G/DL (ref 6.8–8.8)
RBC # BLD AUTO: 4.18 M/UL (ref 3.8–5.2)
SODIUM SERPL-SCNC: 137.4 MMOL/L (ref 132.6–141.4)
TRIGL SERPL-MCNC: 70 MG/DL (ref 0–150)
VLDL CHOLESTEROL: 14 MG/DL (ref 7–32)
WBC # BLD AUTO: 5.7 K/UL (ref 4–11)

## 2020-07-21 RX ORDER — SYRINGE, DISPOSABLE, 1 ML
SYRINGE, EMPTY DISPOSABLE MISCELLANEOUS
Qty: 25 EACH | Refills: 11 | Status: SHIPPED | OUTPATIENT
Start: 2020-07-21 | End: 2020-11-19

## 2020-07-21 RX ORDER — TESTOSTERONE CYPIONATE 200 MG/ML
30 INJECTION, SOLUTION INTRAMUSCULAR
Qty: 13 VIAL | Refills: 3 | Status: SHIPPED | OUTPATIENT
Start: 2020-07-21 | End: 2020-07-27

## 2020-07-21 SDOH — HEALTH STABILITY: MENTAL HEALTH: HOW OFTEN DO YOU HAVE A DRINK CONTAINING ALCOHOL?: NEVER

## 2020-07-21 ASSESSMENT — MIFFLIN-ST. JEOR: SCORE: 1223.85

## 2020-07-21 NOTE — PROGRESS NOTES
Gender Support Clinic Visit Note         HPI       Isaiah is a 25 year old individual that uses pronouns he/him and is consulting to start masculinizing hormone therapy. Patient evaluated by Dr. Cruz via video visit 5/12/2020. Since he didn't have a PCP or recent exam he was referred to us for physical exam, baseline labs and establishing with PC.     Gender identity  Gender Identity: male  Sex Assigned at Birth: female    Gender journey: At 2 years old noticed gender identity didn't match. Came out 2 years prior, took one year to get into [facility] started Jan 2020. Seeing Dr. Saira Whitmore at [facility] for therapy.     Mental health history: Anxiety, OCD, ADHD and Asperger according to the patient's description.        Body characteristics pt is NOT happy with and WANTS to change:    - All of it.   - Chest is dysphoric. Uses GC2B binder, every day for 8 hours  - Wants body fat redistribution.   - Wants Facial hair.   - Hates periods.   - Voice is okay.     Previous medical care related to gender affirmation:   Prior providers Dr. Cruz  Surgical interventions desired: Top surgery    Mental Health Assessment:  PHQ-9 SCORE 1/28/2020   PHQ-9 Total Score 5      GWEN-7 SCORE 1/28/2020   Total Score 4      Active symptoms: Bored, no other current issues   Hx of self harm:  No   Hx of suicidal thoughts: No   Hx of suicide attempts: No  Hospitalization 10 years ago.   Therapy history for gender support: Dr. Tasha Whitmore at [facility]  Chemical use history: No tobacco/vape/alcohol/maijuana/nor other substances  Psych dx history: MDD, Autism spectrum, gender dysphoria  Medications prescribed for above and by whom? Wellbutrin 150mg daily by Psychiatrist Dr. Sims at [facility]  External Records reviewed.    Past History   History reviewed. No pertinent surgical history.  There is no problem list on file for this patient.    Past Medical History:   Diagnosis Date     Anxiety      Depressive disorder      Current Outpatient  "Medications   Medication Sig Dispense Refill     buPROPion (WELLBUTRIN XL) 150 MG 24 hr tablet Take 150 mg by mouth       Family History   Problem Relation Age of Onset     Obesity Mother      Depression Mother      Obesity Father      Cancer No family hx of      Diabetes No family hx of      Hypertension No family hx of      Mother with depression.  Father with migraines.  Sister with type 1 diabetes and migraines.  Maternal grandfather with heart disease,  at age 49, also diabetes and stroke.  Cousin with bipolar disorder.  Maternal grandmother with thyroid.  Remainder of family history is unremarkable.     Allergies   Allergen Reactions     Egg [Chicken-Derived Products (Egg)]      Sexual health and relationships:  Current:    none  Romantic partner(s)  none  Past:       none         Review of Systems:        CONSTITUTIONAL: NEGATIVE for fever, chills, change in weight  INTEGUMENTARY/SKIN: NEGATIVE for worrisome rashes, moles or lesions  EYES: NEGATIVE for vision changes or irritation  ENT/MOUTH: NEGATIVE for ear, mouth and throat problems  RESP: NEGATIVE for significant cough or SOB  BREAST: NEGATIVE for masses, tenderness or discharge  CV: NEGATIVE for chest pain, palpitations or peripheral edema  GI: NEGATIVE for nausea, abdominal pain, heartburn, or change in bowel habits  : NEGATIVE for frequency, dysuria, or hematuria  MUSCULOSKELETAL: NEGATIVE for significant arthralgias or myalgia  NEURO: NEGATIVE for weakness, dizziness or paresthesias  ENDOCRINE: NEGATIVE for temperature intolerance, skin/hair changes  HEME/ALLERGY: NEGATIVE for bleeding problems  PSYCHIATRIC: NEGATIVE for changes in mood or affect  Sleep: normal           Physical Exam:     Vitals:    20 1057   BP: 103/70   Pulse: 85   Resp: 16   Temp: 98.4  F (36.9  C)   TempSrc: Oral   SpO2: 97%   Weight: 48.2 kg (106 lb 3.2 oz)   Height: 1.645 m (5' 4.76\")     BMI= Body mass index is 17.8 kg/m .   Wt Readings from Last 10 Encounters: " "  07/21/20 48.2 kg (106 lb 3.2 oz)     GENERAL: healthy, alert and no distress  EYES: Eyes grossly normal to inspection, conjunctiva normal  HENT: Nose normal, Mouth- no ulcers, no lesions  NECK: no adenopathy, no asymmetry, no masses,  and thyroid normal to palpation, supple  CV: regular rates and rhythm, normal S1 S2, no S3 or S4 and no murmur, no click or rub -  LUNGS: CTA BL no wheeze/rhonchi/rales  ABDOMEN: soft, no tenderness, no  hepatosplenomegaly, no masses  MS: extremities normal- no gross deformities noted, no edema  NEURO: Moves all extremities spontaneously, DTRs at patella   Psych: Alert and oriented times 3; coherent speech, normal rate and volume, able to articulate logical thoughts, able to abstract reason, no tangential thoughts, no hallucinations or delusions. Mood good, affect flat.         Labs:     No results found for any previous visit.     Assessment and Plan     Gender dysphoria  Patient meets criteria for gender dysphoria. Mental health well controlled. Patient has no interest in fertility preservation. Plan to get labs today. Reviewed informed consent including risks/benefits of testosterone, types of testosterone, dosing, contraception (not needed; not sexually active), and expectations. Plan to start testosterone cypionate 30mg (0.15mL) subQ weekly with plan to increase to 50mg (0.25mL) if tolerating well. Encouraged scheduling RN injection teaching visit once gets medications. Follow up with Dr. Murray in 1 month or sooner as needed.  -     CBC with Plt ([facility])  -     Comprehensive Metabolic Panel ([facility])  -     Lipid Cascade ([facility])  -     Needle, Disp, 25G X 5/8\" MISC; Use to inject testosterone weekly.  -     syringe, disposable, (B-D SYRINGE LUER-TOMY) 1 ML MISC; To use with weekly injection  -     needle, disp, 18G X 1\" MISC; Use for weekly testosterone injection  -     testosterone cypionate (DEPOTESTOSTERONE) 200 MG/ML injection; Inject 0.15 mLs (30 mg) into the muscle " every 14 days    Ankle injury- encourage scheduling separate visit with Dr. Murray to discuss.      Educated about 3 parts of evaluation:    1. Medical safety for hormones :   Labs done today, see above   Medication plan:   masculinizing hormone therapy with testosterone     Administration method:  injectable  Next labs and exam:   Follow up w/ Dr. Murray in 1 month, I will send this note to them. Educated pt about consultant role in a residency clinic.  Counselled patient about controlled substances, never share, comes on paper prescription: Yes  Contraception:   not needed  Educated about testosterone as absolute contraindication in pregnancy: Yes  Fertility plan if starts cross-sex hormones: not interested  Plan nurse visit for shot teaching once medication is in hand     2. Mental health assessment  Completed, stable.    3. Informed consent process.   Consent  Yes Is able to provide informed consent   Yes Likely to take hormones in a responsible manner  Yes Discussed physical effects, benefits, and risk assessment & modification  Yes Discussed the clinical and biochemical monitoring of hormonal changes and the potential impact on reproductive health & fertility  We discussed thoroughly risks/benefits/alternatives of this treatment. Questions elicited and answered. Pt is fully prepared to start hormones and is able to reason through risks and mgmt. Questions elicited and answered and they also consent, as is legally required. See scanned in media tab.       Today s visit included assessment of interventions to alleviate symptoms related to gender dysphoria or gender nonconformity, including psychological support, medical treatment, and options for social support or changes in gender expression. Today s visit also assessed this individual's suicide risk, as transgender patients are at higher risk of suicide, gender expression, gender identity and how well consolidated in that identity, presence or absence of gender  "dysphoria versus gender non-conformity, and assessment and diagnosis of coexisting mental health concerns.This assessment is based on the 2011 published Standards of Care for the Health of Transsexual, Transgender, and Gender-Nonconforming People, Version 7, by the [employer] of Transgender Health.    I spent 40 min face to face with the patient and >50% was spent counselling the patient about the above medical conditions, educating patient on their medical conditions, behavioral interventions and supports.    Seen with Margie Murray DO, resident physician.  Lupillo \"Caridad\" DO Oscar  Pager: [phone]            "

## 2020-07-21 NOTE — PATIENT INSTRUCTIONS
After visit summary    -Labs today  -Testosterone sent to pharmacy-- I will have to undergo a prior authorization  -once you get meds and needles, schedule RN injection teaching [phone]  -schedule follow up for ankle  -schedule follow up with Dr. Margie Murray in 1 month for testosterone f/u    Caridad Dominguez and Margie Murray

## 2020-07-22 ENCOUNTER — VIRTUAL VISIT (OUTPATIENT)
Dept: OTHER | Facility: OUTPATIENT CENTER | Age: 25
End: 2020-07-22
Payer: COMMERCIAL

## 2020-07-22 DIAGNOSIS — F64.0 GENDER DYSPHORIA IN ADOLESCENT AND ADULT: Primary | ICD-10-CM

## 2020-07-22 NOTE — PROGRESS NOTES
[facility] -  Case Progress Note    Date of Service: 20   Name: Silvia Fritz  Preferred name: Isaiah - daniel/roxana  : 1995  Medical Record Number: [id number]  Treating Provider: Saira Whitmore, PhD - Postdoctoral Fellow  Type of Session: Individual   Present in Session: Isaiah  Number of Minutes:  55    Health Maintenance Summary - Mental Health Treatment Plan       Status Date      MENTAL HEALTH TX PLAN Next Due 2020      Done 2020 HIM MENTAL HEALTH TX PLAN SCAN        Video start time: 15:00  Video end time: 15:55    Telemedicine Visit: The patient's condition can be safely assessed and treated via synchronous audio and visual telemedicine encounter.      Reason for Telemedicine Visit: Service provided by telemedicine due to COVID-19 pandemic and [facility] directives.     Originating Site (Patient Location): Patient's home    Distant Site (Provider Location): Provider Remote Setting    Consent:  The patient has verbally consented to: the potential risks and benefits of telemedicine (video visit) versus in person care; bill my insurance or make self-payment for services provided; and responsibility for payment of non-covered services.     Mode of Communication:  Video Conference via Verified Identity Pass    As the provider I attest to compliance with applicable laws and regulations related to telemedicine.    Current Symptoms/Status:  Incongruence between experienced/expressed gender (male) and birth assigned sex (female), lasting longer than six months and causing clinically significant distress, with endorsement of the following symptoms:   - marked incongruence between experience/expressed gender (male) and primary and secondary sex characteristics  - strong desire to be rid of birth assigned sex characteristics  - strong desire for secondary sex characteristics of gender other than birth assigned sex (male)  - strong desire to be read and treated as gender other  than birth assigned sex (male)  - strong conviction that his feelings/experiences are typical of gender other than birth assigned sex      History of recurrent major depression, with current endorsement of symptoms in the mildly depressed range  History of Autism Spectrum Disorder, diagnosed at 15.     Progress Toward Treatment Goals:   Isaiah came out to family and friends via letter. Received positive feedback and support    Intervention: Modality and Description:  Primary intervention was integrative psychotherapy towards gender and mental health goals.   Isaiah shared positive outcome of [facility] appointment. Received prescription for testosterone and is awaiting insurance approval. Will proceed through [facility] for now, as they are able to begin IM testosterone.   Had a good meeting with grandparents after his coming out announcement to family. Pleased with grandparents' efforts to correct name/pronouns, and is understanding of their learning curve. Isaiah has concern about step-grandmother's family and how they will react to him at [place]. Also has concerns about coming out at work. Discussed ways Isaiah can approach these conversations. Also discussed Isaiah's tendency to fixate on worst-case scenario, even for events far in the future. Isaiah shared that he has always done this - discussed as a function of depression and ASD. Isaiah was able to identify that this tendency can prevent him from being present-focused.    Isaiah was engaged and responsive to intervention.     Interactive Complexity:  None    Assignment:  Explore [facility] Website  Continue working with Kina on insurance coverage, attend injection teaching appointment    DSM-5 Diagnoses:  302.85 Gender Dysphoria in Adolescents and Adults   296.32 - Major Depressive Disorder - recurrent, moderate   299.00 - Autism Spectrum Disorder - by history    Plan/Need for Future Services:  Return for therapy in two weeks to treat diagnosed  problem.    Saira Whitmore, PhD - Postdoctoral Fellow

## 2020-07-27 DIAGNOSIS — F64.9 GENDER DYSPHORIA: ICD-10-CM

## 2020-07-27 RX ORDER — TESTOSTERONE CYPIONATE 200 MG/ML
30 INJECTION, SOLUTION INTRAMUSCULAR
Qty: 13 VIAL | Refills: 3 | Status: SHIPPED | OUTPATIENT
Start: 2020-07-27 | End: 2020-08-03

## 2020-07-27 NOTE — TELEPHONE ENCOUNTER
Medication prescribed. Spoke with patient and informed him. He verbalized understanding and requested to schedule a follow up appointment. Patient transferred to  to schedule.    Ghislaine Hernandez RN

## 2020-07-27 NOTE — TELEPHONE ENCOUNTER
Received a call from the patient who stated that the testosterone was never sent to the pharmacy. The prescription was approved, but printed in clinic, this copy will be destroyed. New order pended with preferred pharmacy and sent to preceptor to sign.    Ghislaine Hernandez RN

## 2020-07-30 ENCOUNTER — ALLIED HEALTH/NURSE VISIT (OUTPATIENT)
Dept: FAMILY MEDICINE | Facility: CLINIC | Age: 25
End: 2020-07-30
Payer: COMMERCIAL

## 2020-07-30 DIAGNOSIS — F64.9 GENDER DYSPHORIA: Primary | ICD-10-CM

## 2020-07-30 NOTE — NURSING NOTE
"Patient presented to clinic with all supplies for subcutaneous injection teaching. Patient came to appointment alone.      RN reviewed necessary supplies: difference in needles (drawing up vs injecting), how to read a syringe, how to read medication label, disposal of sharps, and proper hand hygiene.     Discussed how to switch out needles and patient demonstrated understanding of reading syringe. RN reviewed safe needle handling (using \"scoop\" method). Patient independently dimple up proper amount of Testosterone.     RN discussed site for IM injection and reviewed proper IM injection technique. Patient practiced using injecting pad.    At end of visit, patient independently completed self-injection of 0.15 mL (30 mg) Testosterone into abdominal tissue under supervision of RN.    Completed visit with discussion of refill policy.     Patient verbalized understanding and was engaged during appointment.    Dr. Vázquez was the preceptor on site for this visit and available for questions.    Carlita Almeida RN      "

## 2020-08-04 ENCOUNTER — VIRTUAL VISIT (OUTPATIENT)
Dept: OTHER | Facility: OUTPATIENT CENTER | Age: 25
End: 2020-08-04
Payer: COMMERCIAL

## 2020-08-04 DIAGNOSIS — F64.0 GENDER DYSPHORIA IN ADOLESCENT AND ADULT: Primary | ICD-10-CM

## 2020-08-04 NOTE — PROGRESS NOTES
[facility] -  Case Progress Note    Date of Service: 20   Name: Silvia Fritz  Preferred name: Isaiah - daniel/roxana  : 1995  Medical Record Number: [id number]  Treating Provider: Saira Whitmore, PhD - Postdoctoral Fellow  Type of Session: Individual   Present in Session: Isaiah  Number of Minutes:  55    Health Maintenance Summary - Mental Health Treatment Plan       Status Date      MENTAL HEALTH TX PLAN Next Due 2020      Done 2020 HIM MENTAL HEALTH TX PLAN SCAN        Video start time: 15:00  Video end time: 15:55  Telemedicine Visit: The patient's condition can be safely assessed and treated via synchronous audio and visual telemedicine encounter.      Reason for Telemedicine Visit: Service provided by telemedicine due to COVID-19 pandemic and [facility] directives.     Originating Site (Patient Location): Patient's home    Distant Site (Provider Location): Provider Remote Setting    Consent:  The patient has verbally consented to: the potential risks and benefits of telemedicine (video visit) versus in person care; bill my insurance or make self-payment for services provided; and responsibility for payment of non-covered services.     Mode of Communication:  Video Conference via [email or web address]    As the provider I attest to compliance with applicable laws and regulations related to telemedicine.    Current Symptoms/Status:  Incongruence between experienced/expressed gender (male) and birth assigned sex (female), lasting longer than six months and causing clinically significant distress, with endorsement of the following symptoms:   - marked incongruence between experience/expressed gender (male) and primary and secondary sex characteristics  - strong desire to be rid of birth assigned sex characteristics  - strong desire for secondary sex characteristics of gender other than birth assigned sex (male)  - strong desire to be read and treated as gender other than  birth assigned sex (male)  - strong conviction that his feelings/experiences are typical of gender other than birth assigned sex      History of recurrent major depression, with current endorsement of symptoms in the mildly depressed range  History of Autism Spectrum Disorder, diagnosed at 15.     Progress Toward Treatment Goals:   Isaiah came out to family and friends via letter. Received positive feedback and support  Began testosterone therapy late July 2020 @ [facility]  Interested in top surgery - began discussing 08/2020  Pursuing legal name change - has paperwork but not begun process    Intervention: Modality and Description:  Primary intervention was integrative psychotherapy towards gender and mental health goals.   Isaiah began testosterone this past week - feels positively having reached this goal - paying out of pocket due to no coverage - suggested he address this with staff at [facility] who completed prior authorization.   Isaiah requested information on top surgery - provided education on letters of support, [facility] standards of care, importance of aftercare planning, and Gender Care Coordination services through the [facility] system. Isaiah considering trying to schedule surgery for February which is his off season.   Isaiah continuing to review legal name change documents. [facility] and [facility].  Continued to discuss family dynamics - current issue involves issue of taking down family photos that show Isaiah before transition. Isaiah showing effort to engage in perspective-taking and validate mother's feelings, but struggling to express to parents feelings of hurt and dismissal.      Isaiah was engaged and responsive to intervention.     Interactive Complexity:  None    Assignment:  [facility]  Continue working with [facility] on insurance coverage, attend injection teaching appointment    DSM-5 Diagnoses:  302.85 Gender Dysphoria in  Adolescents and Adults   296.32 - Major Depressive Disorder - recurrent, moderate   299.00 - Autism Spectrum Disorder - by history    Plan/Need for Future Services:  Return for therapy in two weeks to treat diagnosed problem.    Saira Whitmore, PhD - Postdoctoral Fellow

## 2020-08-12 ENCOUNTER — OFFICE VISIT (OUTPATIENT)
Dept: FAMILY MEDICINE | Facility: CLINIC | Age: 25
End: 2020-08-12
Payer: COMMERCIAL

## 2020-08-12 VITALS
RESPIRATION RATE: 16 BRPM | SYSTOLIC BLOOD PRESSURE: 109 MMHG | OXYGEN SATURATION: 100 % | TEMPERATURE: 98.3 F | HEIGHT: 65 IN | DIASTOLIC BLOOD PRESSURE: 73 MMHG | WEIGHT: 108.4 LBS | BODY MASS INDEX: 18.06 KG/M2 | HEART RATE: 78 BPM

## 2020-08-12 DIAGNOSIS — F64.9 GENDER DYSPHORIA: ICD-10-CM

## 2020-08-12 DIAGNOSIS — Z87.828 HISTORY OF ANKLE SPRAIN: Primary | ICD-10-CM

## 2020-08-12 RX ORDER — TESTOSTERONE CYPIONATE 200 MG/ML
50 INJECTION, SOLUTION INTRAMUSCULAR WEEKLY
Qty: 13 VIAL | Refills: 3 | Status: SHIPPED | OUTPATIENT
Start: 2020-08-12 | End: 2020-11-19

## 2020-08-12 ASSESSMENT — PATIENT HEALTH QUESTIONNAIRE - PHQ9: SUM OF ALL RESPONSES TO PHQ QUESTIONS 1-9: 5

## 2020-08-12 ASSESSMENT — MIFFLIN-ST. JEOR: SCORE: 1230.7

## 2020-08-12 NOTE — PATIENT INSTRUCTIONS
Here is the plan from today's visit:  Adjust testosterone dosing to 50mg testosterone ( 0.25ml ) subcutaneously once weekly  Follow-up in one month (video visit okay)  See print off for timeline of masculinizing affects  Contact clinic sooner than one month if any new concerns    Dr. Terri Ambriz Perscription    - testosterone cypionate (DEPOTESTOSTERONE) 200 MG/ML injection; Inject 0.25 mLs (50 mg) Subcutaneous once a week  Dispense: 13 vial; Refill: 3      Please call or return to clinic if your symptoms don't go away.    Follow up plan      Thank you for coming to [facility] today.  Lab Testing:  **If you had lab testing today and your results are reassuring or normal they will be mailed to you or sent through [employer] within 7 days.   **If the lab tests need quick action we will call you with the results.  The phone number we will call with results is # [phone] (home) . If this is not the best number please call our clinic and change the number.  Medication Refills:  If you need any refills please call your pharmacy and they will contact us.   If you need to  your refill at a new pharmacy, please contact the new pharmacy directly. The new pharmacy will help you get your medications transferred faster.   Scheduling:  If you have any concerns about today's visit or wish to schedule another appointment please call our office during normal business hours [phone] (8-5:00 M-F)  If a referral was made to a [facility] and you don't get a call from central scheduling please call [phone].  If a Mammogram was ordered for you at [facility] call [phone] to schedule or change your appointment.  If you had an XRay/CT/Ultrasound/MRI ordered the number is [phone] to schedule or change your radiology appointment.   Medical Concerns:  If you have urgent medical concerns please call [phone] at any time of the day.    Margie Murray, DO
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
## 2020-08-12 NOTE — PROGRESS NOTES
Preceptor Attestation:    Patient seen and evaluated in person. I discussed the patient with the resident. I have verified the content of the note, which accurately reflects my assessment of the patient and the plan of care.   Supervising Physician:  Rex Vilchis MD.

## 2020-08-18 ENCOUNTER — VIRTUAL VISIT (OUTPATIENT)
Dept: OTHER | Facility: OUTPATIENT CENTER | Age: 25
End: 2020-08-18
Payer: COMMERCIAL

## 2020-08-18 DIAGNOSIS — F64.0 GENDER DYSPHORIA IN ADOLESCENT AND ADULT: Primary | ICD-10-CM

## 2020-08-18 NOTE — PROGRESS NOTES
[facility] -  Case Progress Note    Date of Service: 20   Name: Silvia Fritz  Preferred name: Isaiah - daniel/roxana  : 1995  Medical Record Number: [id number]  Treating Provider: Saira Whitmore, PhD - Postdoctoral Fellow  Type of Session: Individual   Present in Session: Isaiah  Number of Minutes:  55    Health Maintenance Summary - Mental Health Treatment Plan       Status Date      MENTAL HEALTH TX PLAN Next Due 2020      Done 2020 [facility] MENTAL HEALTH TX PLAN SCAN      Video start time: 15:00  Video end time: 15:55  Telemedicine Visit: The patient's condition can be safely assessed and treated via synchronous audio and visual telemedicine encounter.      Reason for Telemedicine Visit: Service provided by telemedicine due to COVID-19 pandemic and [facility] directives.     Originating Site (Patient Location): Patient's home    Distant Site (Provider Location): Provider Remote Setting    Consent:  The patient has verbally consented to: the potential risks and benefits of telemedicine (video visit) versus in person care; bill my insurance or make self-payment for services provided; and responsibility for payment of non-covered services.     Mode of Communication:  Video Conference via [email or web address]    As the provider I attest to compliance with applicable laws and regulations related to telemedicine.    Current Symptoms/Status:  Incongruence between experienced/expressed gender (male) and birth assigned sex (female), lasting longer than six months and causing clinically significant distress, with endorsement of the following symptoms:   - marked incongruence between experience/expressed gender (male) and primary and secondary sex characteristics  - strong desire to be rid of birth assigned sex characteristics  - strong desire for secondary sex characteristics of gender other than birth assigned sex (male)  - strong desire to be read and treated as gender other than  birth assigned sex (male)  - strong conviction that his feelings/experiences are typical of gender other than birth assigned sex      History of recurrent major depression, with current endorsement of symptoms in the mildly depressed range  History of Autism Spectrum Disorder, diagnosed at 15.     Progress Toward Treatment Goals:   Isaiah came out to family and friends via letter. Received positive feedback and support  Began testosterone therapy late July 2020 @ [facility]  Interested in top surgery - began discussing 08/2020  Pursuing legal name change - has paperwork but not begun process    Intervention: Modality and Description:  Primary intervention was integrative psychotherapy towards gender and mental health goals.   Noting some voice changes since beginning testosterone. Happy with this change, feeling very good since beginning HRT.   Continued to discuss goal of top surgery, and Isaiah's research on the surgical process and choice of surgeons. Discussed financial considerations, and Isaiah shared that he has saved up enough to cover the procedure independent of insurance. Referred to [facility] for support in navigating insurance questions. Reviewed requirements for letter of support.   Isaiah was engaged and responsive to intervention.     Interactive Complexity:  None    Assignment:  Explore [facility] [place] Website  Continue working with Kina on insurance coverage  Call Gender Care Coordinators    DSM-5 Diagnoses:  302.85 Gender Dysphoria in Adolescents and Adults   296.32 - Major Depressive Disorder - recurrent, moderate   299.00 - Autism Spectrum Disorder - by history    Plan/Need for Future Services:  Return for therapy in two weeks to treat diagnosed problem.    Saira Whitmore, PhD - Postdoctoral Fellow

## 2020-09-08 ENCOUNTER — VIRTUAL VISIT (OUTPATIENT)
Dept: OTHER | Facility: OUTPATIENT CENTER | Age: 25
End: 2020-09-08
Payer: COMMERCIAL

## 2020-09-08 DIAGNOSIS — F64.0 GENDER DYSPHORIA IN ADOLESCENT AND ADULT: Primary | ICD-10-CM

## 2020-09-08 NOTE — PROGRESS NOTES
I did not personally see the patient but I have reviewed and agree with the assessment and plan as documented in this note.  Zuri Rudd, PhD -- Supervisor   Licensed Psychologist

## 2020-09-08 NOTE — PROGRESS NOTES
[facility] -  Case Progress Note    Date of Service: 20   Name: Silvia Fritz  Preferred name: Isaiah - daniel/roxana  : 1995  Medical Record Number: [id number]  Treating Provider: Saira Whitmore, PhD - Postdoctoral Fellow  Type of Session: Individual   Present in Session: Isaiah  Number of Minutes:  55    Health Maintenance Summary - Mental Health Treatment Plan       Status Date      MENTAL HEALTH TX PLAN Next Due 2020      Done 2020 [facility] MENTAL HEALTH TX PLAN SCAN        Video start time: 15:00  Video end time: 15:55    Telemedicine Visit: The patient's condition can be safely assessed and treated via synchronous audio and visual telemedicine encounter.      Reason for Telemedicine Visit: Service provided by telemedicine due to COVID-19 pandemic and [facility] directives.     Originating Site (Patient Location): Patient's home    Distant Site (Provider Location): Provider Remote Setting    Consent:  The patient has verbally consented to: the potential risks and benefits of telemedicine (video visit) versus in person care; bill my insurance or make self-payment for services provided; and responsibility for payment of non-covered services.     Mode of Communication:  Video Conference via [email or web address]    As the provider I attest to compliance with applicable laws and regulations related to telemedicine.    Current Symptoms/Status:  Incongruence between experienced/expressed gender (male) and birth assigned sex (female), lasting longer than six months and causing clinically significant distress, with endorsement of the following symptoms:   - marked incongruence between experience/expressed gender (male) and primary and secondary sex characteristics  - strong desire to be rid of birth assigned sex characteristics  - strong desire for secondary sex characteristics of gender other than birth assigned sex (male)  - strong desire to be read and treated as gender other than  birth assigned sex (male)  - strong conviction that his feelings/experiences are typical of gender other than birth assigned sex      History of recurrent major depression, with current endorsement of symptoms in the mildly depressed range. Primary symptom of concern for Isaiah is irritability.     History of Autism Spectrum Disorder, diagnosed at 15.     Progress Toward Treatment Goals:   Isaiah came out to family and friends via letter in June 2020. Received positive feedback and support.  Began testosterone therapy late July 2020 @ [facility]  Interested in top surgery - began discussing 08/2020   Pursuing legal name change - has paperwork but not begun process  A referral is placed to [facility], on 9/8, for transfer of psychiatric care.     Intervention: Modality and Description:  Primary intervention was integrative psychotherapy towards gender and mental health goals.   Isaiah to have two-month f/u with [facility] since testosterone start. Some concern that he is not seeing changes as quickly as anticipated. Provided some psychoeducation on individual differences in response, ranges of response time, process of increasing dose over time in collaboration with physician. Isaiah to discuss with provider next week.   Isaiah continues to do personal research on top surgery - wants to understand different surgical approaches better. Provided resources for education on surgical approaches, recovery time/aftercare, and began developing list of questions for consultation. Encouraged call to schedule consult when ready.  Isaiah struggling with boredom at home. 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 Began discussing exploring other options for employment.   Isaiah was engaged and responsive to intervention.     Interactive Complexity:  None    Assignment:  Explore [facility] Website for name change support   Call Gender Care  Coordinators  Consider career interests and goals     DSM-5 Diagnoses:  302.85 Gender Dysphoria in Adolescents and Adults   296.32 - Major Depressive Disorder - recurrent, moderate   299.00 - Autism Spectrum Disorder - by history    Plan/Need for Future Services:  Return for therapy in two weeks to treat diagnosed problem.    Saira Whitmore, PhD - Postdoctoral Fellow

## 2020-09-08 NOTE — Clinical Note
Isaiah Conley is a transmasculine client I see who has diagnosis of ASD (formerly Asperger's) and long struggle with depression. Isaiah is interested in transferring care to the center. Is interested in re-eval of depression meds.

## 2020-09-16 ENCOUNTER — VIRTUAL VISIT (OUTPATIENT)
Dept: FAMILY MEDICINE | Facility: CLINIC | Age: 25
End: 2020-09-16
Payer: COMMERCIAL

## 2020-09-16 DIAGNOSIS — F33.1 MODERATE RECURRENT MAJOR DEPRESSION (H): ICD-10-CM

## 2020-09-16 DIAGNOSIS — F64.9 GENDER DYSPHORIA: Primary | ICD-10-CM

## 2020-09-16 DIAGNOSIS — F41.9 ANXIETY: ICD-10-CM

## 2020-09-16 NOTE — PROGRESS NOTES
"Family Medicine Video Visit Note  Isaiah is being evaluated via a billable video visit.             Video Visit Consent     Patient was verbally read the following and verbal consent was obtained.  \"This video visit will be conducted via a call between you and your physician/provider. We have found that certain health care needs can be provided without the need for an in-person physical exam.  This service lets us provide the care you need with a video conversation.  If a prescription is necessary we can send it directly to your pharmacy.  If lab work is needed we can place an order for that and you can then stop by our lab to have the test done at a later time.    If during the course of the call the physician/provider feels a video visit is not appropriate, you will not be charged for this service.\"     (Name person giving consent:  Patient   Date verbal consent given:  9/16/2020  Time verbal consent given:  2:42 PM)    Patient would like the video invitation sent by: Send to e-mail at: [email or web address]       Chief Complaint   Patient presents with     Follow Up     HRT     Current Outpatient Medications   Medication Sig Dispense Refill     buPROPion (WELLBUTRIN XL) 150 MG 24 hr tablet Take 150 mg by mouth       needle, disp, 18G X 1\" MISC Use for weekly testosterone injection 25 each 11     Needle, Disp, 25G X 5/8\" MISC Use to inject testosterone weekly. 25 each 11     syringe, disposable, (B-D SYRINGE LUER-TOMY) 1 ML MISC To use with weekly injection 25 each 11     testosterone cypionate (DEPOTESTOSTERONE) 200 MG/ML injection Inject 0.25 mLs (50 mg) Subcutaneous once a week 13 vial 3     Allergies   Allergen Reactions     Egg [Chicken-Derived Products (Egg)]      Milk Protein Extract             HPI      Video Start Time: 9/16/20 3:10 PM     Isaiah is a 25 year old individual that uses pronouns He/Him/His/Himself that presents today for follow up of:  masculinizing hormone therapy.     Gender " "identity:Male    Any special concerns today?  no current concerns. Relates hasn't had acne/oily skin body hair changes that wonders if should have happened by now. Not noticing a difference in mood since starting injections. Has enough meds for next 2 weeks. Needs to refill needles and testosterone after that.     On hormones?  YES  Shot day of the week? Thursday      Due for labs?  In a few weeks    Refills of meds needed?  Yes (has refills available)    Gender affirmation is being sought in these other ways: interested in top surgery.     ---    History reviewed. No pertinent surgical history.    Patient Active Problem List   Diagnosis     Anxiety     ADHD, predominantly inattentive type     Asperger's syndrome     Gender dysphoria     Moderate recurrent major depression (H)       Current Outpatient Medications   Medication Sig Dispense Refill     buPROPion (WELLBUTRIN XL) 150 MG 24 hr tablet Take 150 mg by mouth       needle, disp, 18G X 1\" MISC Use for weekly testosterone injection 25 each 11     Needle, Disp, 25G X 5/8\" MISC Use to inject testosterone weekly. 25 each 11     syringe, disposable, (B-D SYRINGE LUER-TOMY) 1 ML MISC To use with weekly injection 25 each 11     testosterone cypionate (DEPOTESTOSTERONE) 200 MG/ML injection Inject 0.25 mLs (50 mg) Subcutaneous once a week 13 vial 3       History   Smoking Status     Never Smoker   Smokeless Tobacco     Never Used        Allergies   Allergen Reactions     Egg [Chicken-Derived Products (Egg)]      Milk Protein Extract        Problem, Medication and Allergy Lists were reviewed and are current.         Review of Systems:        General    Fat redistribution: no    Weight change: no HEENT    Voice change: no     Cardiovascular (CV)    Chest Pains: no    Shortness of breath: no Chest    Decreased exercise tolerance:  no    Breast changes/development: no     Gastrointestinal (GI)    Abdominal pain: no    Change in appetite: no Skin    Acne or oily skin: " "no    Change in hair: no     Genitourinary ()    Abnormal vaginal bleeding: periods twice since started injections firt 5-6 days, next 4-5 days    Decreased spontaneous erections: not applicable    Change in libido: no    New sexual partners: no Musculoskeletal    Leg pain or swelling: no     Psychiatric (Psych)    Depression: YES    Anxiety/Panic: no    Mood:  \"okay\"      Bored & frustrated that it looks like job is not going to havppen this year.  hasn't heard how October work schedule will plan out. Mood is unchanged from a month ago. No SI, no thoughts self harm. Safe at home    Not sure about transfering psychologists. Had bad luck with therapists, with people leaving the practices (has gone through 5-6 therapists). Remains on wellbutrin for mood. Other people notice a difference. He does not.                     Physical Exam:   There were no vitals filed for this visit.  BMI= There is no height or weight on file to calculate BMI.   Estimated body mass index is 18.28 kg/m  as calculated from the following:    Height as of 8/12/20: 1.64 m (5' 4.57\").    Weight as of 8/12/20: 49.2 kg (108 lb 6.4 oz).    Wt Readings from Last 10 Encounters:   08/12/20 49.2 kg (108 lb 6.4 oz)   07/21/20 48.2 kg (106 lb 3.2 oz)     Appearance: Male appearance and dress  GENERAL:: healthy, alert and no distress  EYES: Eyes grossly normal to inspection, sclera / conjunctiva clear, EOM in tact  RESP: No evidence of respiratory distress, speaking full sentances no wheezes  SKIN: no suspicious lesions, no rashes at exposed skin  Psych: Alert and oriented times 3; coherent speech, normal rate and volume, able to articulate logical thoughts, able to abstract reason, no tangential thoughts, no hallucinations or delusions. Affect is flat.     Assessment and Plan     25 year old transman presenting for HRT follow-up. Has been on testosterone 50mg q week (dose adjusted 8/12). Tolerating medication without overt side effects. Denies mood " change associated with testosterone. Mood similar from one month prior. No safety concern indicating acute escalation of care. Patient continues following with psychology & psychiatry teams. Gets sense mood isn't being address at psychology appointments. Reviewed adjuvent interventions including exercise, meditation, light therapy. Isaiah willing to try 20-30 min cardiovascular exercise 5 x a week. Reviewed plan to follow-up in 3-4 weeks. Would get midcycle testosterone level and repeat labs at that time. Follow-up sooner as needed.  Refills available for meds and supplies.     Follow up:  Follow up in 1 month (3rd week of October for fasting glucose/lipids, Hgb, LFTs, midcycle testosterone level.    Results by [employer]  Questions were elicited and answered.     Steffi Murray,     After Visit Information:  Patient chose to view AVS via [employer]  No follow-ups on file.      Video-Visit Details    Type of service:  Video Visit    Video End Time (time video stopped): 9/16/20 3:55    Originating Location (pt. Location): Home    Distant Location (provider location):  [facility]     Mode of Communication:  Video Conference via [employer]

## 2020-09-22 ENCOUNTER — TELEPHONE (OUTPATIENT)
Dept: OTHER | Facility: OUTPATIENT CENTER | Age: 25
End: 2020-09-22

## 2020-09-22 ENCOUNTER — VIRTUAL VISIT (OUTPATIENT)
Dept: OTHER | Facility: OUTPATIENT CENTER | Age: 25
End: 2020-09-22
Payer: COMMERCIAL

## 2020-09-22 DIAGNOSIS — F33.1 MAJOR DEPRESSIVE DISORDER, RECURRENT EPISODE, MODERATE (H): ICD-10-CM

## 2020-09-22 DIAGNOSIS — F84.0 AUTISM SPECTRUM: ICD-10-CM

## 2020-09-22 DIAGNOSIS — F64.0 GENDER DYSPHORIA IN ADOLESCENT AND ADULT: Primary | ICD-10-CM

## 2020-09-22 NOTE — PROGRESS NOTES
[facility] -  Case Progress Note    Date of Service: 20   Name: Silvia Fritz  Preferred name: Isaiah - daniel/roxana  : 1995  Medical Record Number: [id number]  Treating Provider: Saira Whitmore, PhD - Postdoctoral Fellow  Type of Session: Individual   Present in Session: Isaiah  Number of Minutes:  55    Health Maintenance Summary - Mental Health Treatment Plan       Status Date      MENTAL HEALTH TX PLAN Next Due 2020      Done 2020 HIM MENTAL HEALTH TX PLAN SCAN        Video start time: 15:00  Video end time: 15:55    Telemedicine Visit: The patient's condition can be safely assessed and treated via synchronous audio and visual telemedicine encounter.      Reason for Telemedicine Visit: Service provided by telemedicine due to COVID-19 pandemic and [facility] directives.     Originating Site (Patient Location): Patient's home    Distant Site (Provider Location): Provider Remote Setting    Consent:  The patient has verbally consented to: the potential risks and benefits of telemedicine (video visit) versus in person care; bill my insurance or make self-payment for services provided; and responsibility for payment of non-covered services.     Mode of Communication:  Video Conference via [email or web address]    As the provider I attest to compliance with applicable laws and regulations related to telemedicine.    Current Symptoms/Status:  Incongruence between experienced/expressed gender (male) and birth assigned sex (female), lasting longer than six months and causing clinically significant distress, with endorsement of the following symptoms:   - marked incongruence between experience/expressed gender (male) and primary and secondary sex characteristics  - strong desire to be rid of birth assigned sex characteristics  - strong desire for secondary sex characteristics of gender other than birth assigned sex (male)  - strong desire to be read and treated as gender other than  birth assigned sex (male)  - strong conviction that his feelings/experiences are typical of gender other than birth assigned sex      History of recurrent major depression, with current endorsement of symptoms in the mildly depressed range. Primary symptom of concern for Isaiah is irritability.     History of Autism Spectrum Disorder, diagnosed at 15.     Progress Toward Treatment Goals:   Isaiah open to referral to /[facility]  Isaiah came out to family and friends via letter in June 2020. Received positive feedback and support.  Began testosterone therapy late July 2020 @ [facility]  Interested in top surgery - began discussing 08/2020   Pursuing legal name change - has paperwork but not begun process  A referral is placed to [facility], on 9/8, for transfer of psychiatric care.     Intervention: Modality and Description:  Primary intervention was integrative psychotherapy towards gender and mental health goals.   Isaiah noting some changes on testosterone and is pleased - awaiting first set of labs to determine whether to increase dose. Continues to research top surgery - referred to [facility] for education on surgery and aftercare. He is developing a list of questions for consultation.   Discussed MH and general goals, as gender goals are progressing well. Isaiah feeling stuck in many areas of life, but doesn't believe he has skills that will allow him to do other types of work or live independent from parents. There is much conflict at home. Feels unhappy in life and uncertain how to attain happiness. Explored areas of interest (dogs, videogames, writing, babysitting) - recommended some type of career counseling to explore options available to him. Discussed possible referral to [facility] program for support towards independent living. Isaiah is interested in this. Isaiah identified a tendency towards negativistic thinking about self/world/future. Identifies this as an internal  barrier toward many of his life goals, and stated that it feels like time to try challenging this. Isaiah was engaged and responsive to intervention.     Interactive Complexity:  None    Assignment:  Explore [facility] [place] Website for name change support   Call Gender Care Coordinator to set top surgery consult when ready    DSM-5 Diagnoses:  302.85 Gender Dysphoria in Adolescents and Adults  296.32 - Major Depressive Disorder - recurrent, moderate   299.00 - Autism Spectrum Disorder - by history    Plan/Need for Future Services:  Return for therapy in two weeks to treat diagnosed problem.      Saira Whitmore, PhD - Postdoctoral Fellow

## 2020-09-22 NOTE — TELEPHONE ENCOUNTER
----- Message from Saira Whitmore, PhD sent at 9/22/2020  3:07 PM CDT -----  Hi, just talking with him - shante, no outreach yet. He'll await your call!  Thanks Anamika!  ----- Message -----  From: Anamika Leiva [facility]  Sent: 9/22/2020  12:02 PM CDT  To: Saira Whitmore, PhD    Saira,    Could you ask Isaiah if I have reached out to them regarding an appt fahad Luna.  I truly do not remember and I did not note that I sent out paperwork.      Thanks,    Anamika

## 2020-09-24 NOTE — PATIENT INSTRUCTIONS
Here is the plan from today's visit    Follow-up in 3-4 weeks for labs and in person visit.   Contact clinic sooner than one month if any new concerns     Dr. Murray    - Lipid panel reflex to direct LDL Fasting; Future  - Hemoglobin (HGB) ([facility]); Future  - Comprehensive Metabolic Panel ([facility]); Future  - Testosterone total; Future    Please call or return to clinic if your symptoms don't go away.    Follow up plan    Thank you for coming to [facility] today.  Lab Testing: Please make a lab appointment before your next visit.  **If you had lab testing today and your results are reassuring or normal they will be mailed to you or sent through [email or web address] within 7 days.   **If the lab tests need quick action we will call you with the results.  The phone number we will call with results is # [phone] (home) . If this is not the best number please call our clinic and change the number.  Medication Refills:  If you need any refills please call your pharmacy and they will contact us.   If you need to  your refill at a new pharmacy, please contact the new pharmacy directly. The new pharmacy will help you get your medications transferred faster.   Scheduling:  If you have any concerns about today's visit or wish to schedule another appointment please call our office during normal business hours [phone] (8-5:00 M-F)  If a referral was made to a [facility] and you don't get a call from central scheduling please call [phone].  If a Mammogram was ordered for you at [facility] call [phone] to schedule or change your appointment.  If you had an XRay/CT/Ultrasound/MRI ordered the number is [phone] to schedule or change your radiology appointment.   Medical Concerns:  If you have urgent medical concerns please call [phone] at any time of the day.    Margie Murray, DO

## 2020-09-24 NOTE — PROGRESS NOTES
Preceptor Attestation:   Patient seen and evaluated via video visit. I discussed the patient with the resident. I have verified the content of the note, which accurately reflects my assessment of the patient and the plan of care.   Supervising Physician:  Jose Garner MD.

## 2020-10-07 ENCOUNTER — VIRTUAL VISIT (OUTPATIENT)
Dept: PSYCHOLOGY | Facility: CLINIC | Age: 25
End: 2020-10-07
Payer: COMMERCIAL

## 2020-10-07 DIAGNOSIS — F64.0 GENDER DYSPHORIA IN ADOLESCENT AND ADULT: Primary | ICD-10-CM

## 2020-10-07 PROCEDURE — 90834 PSYTX W PT 45 MINUTES: CPT | Mod: U7 | Performed by: STUDENT IN AN ORGANIZED HEALTH CARE EDUCATION/TRAINING PROGRAM

## 2020-10-07 PROCEDURE — 99207 PR NO CHARGE LOS: CPT | Performed by: STUDENT IN AN ORGANIZED HEALTH CARE EDUCATION/TRAINING PROGRAM

## 2020-10-07 NOTE — PROGRESS NOTES
[facility] -  Case Progress Note    Date of Service: 10/07/20   Name: Silvia Fritz  Preferred name: Isaiah - daniel/roxana  : 1995  Medical Record Number: [id number]  Treating Provider: Saira Whitmore, PhD - Postdoctoral Fellow  Type of Session: Individual   Present in Session: Isaiah  Number of Minutes:  45    Health Maintenance Summary - Mental Health Treatment Plan       Status Date      MENTAL HEALTH TX PLAN Next Due 2020      Done 2020 HIM MENTAL HEALTH TX PLAN SCAN        Video start time: 15:10  Video end time: 15:55    Telemedicine Visit: The patient's condition can be safely assessed and treated via synchronous audio and visual telemedicine encounter.      Reason for Telemedicine Visit: Service provided by telemedicine due to COVID-19 pandemic and [facility] directives.     Originating Site (Patient Location): Patient's home    Distant Site (Provider Location): Provider Remote Setting    Consent:  The patient has verbally consented to: the potential risks and benefits of telemedicine (video visit) versus in person care; bill my insurance or make self-payment for services provided; and responsibility for payment of non-covered services.     Mode of Communication:  Video Conference via [email or web address]    As the provider I attest to compliance with applicable laws and regulations related to telemedicine.    Current Symptoms/Status:  Incongruence between experienced/expressed gender (male) and birth assigned sex (female), lasting longer than six months and causing clinically significant distress, with endorsement of the following symptoms:   - marked incongruence between experience/expressed gender (male) and primary and secondary sex characteristics  - strong desire to be rid of birth assigned sex characteristics  - strong desire for secondary sex characteristics of gender other than birth assigned sex (male)  - strong desire to be read and treated as gender other than  birth assigned sex (male)  - strong conviction that his feelings/experiences are typical of gender other than birth assigned sex      History of recurrent major depression, with current endorsement of symptoms in the mildly depressed range. Primary symptom of concern for Isaiah is irritability.     History of Autism Spectrum Disorder, diagnosed at 15.     Progress Toward Treatment Goals:   Isaiah open to referral to /[facility]  Isaiah came out to family and friends via letter in June 2020. Received positive feedback and support.  Began testosterone therapy late July 2020 @ [facility]  Interested in top surgery - began discussing 08/2020   Pursuing legal name change - has paperwork but not begun process  A referral is placed to [facility], on 9/8, for transfer of psychiatric care.     Intervention: Modality and Description:  Primary intervention was integrative psychotherapy towards gender and mental health goals.   Isaiah shared that he has still not called for a top surgery consult. Something is holding him back, but not entirely sure what. Session spent exploring some financial-related fears, as well as fears about moving through the world with top surgery scars that might out him. Session spent providing some education and doing collaborative research about surgery processes, costs, aftercare, as well as normalizing/validating fears. Isaiah was engaged and responsive to intervention.     Interactive Complexity:  None    Assignment:  Explore [facility] [place] Website for name change support   Call Gender Care Coordinator to set top surgery consult when ready    DSM-5 Diagnoses:  302.85 Gender Dysphoria in Adolescents and Adults  296.32 - Major Depressive Disorder - recurrent, moderate   299.00 - Autism Spectrum Disorder - by history    Plan/Need for Future Services:  Return for therapy in two weeks to treat diagnosed problem.      Saira Whitmore, PhD - Postdoctoral Fellow

## 2020-10-22 ENCOUNTER — PATIENT OUTREACH (OUTPATIENT)
Dept: PLASTIC SURGERY | Facility: CLINIC | Age: 25
End: 2020-10-22

## 2020-10-22 ENCOUNTER — VIRTUAL VISIT (OUTPATIENT)
Dept: PSYCHOLOGY | Facility: CLINIC | Age: 25
End: 2020-10-22
Payer: COMMERCIAL

## 2020-10-22 DIAGNOSIS — F64.0 GENDER DYSPHORIA IN ADOLESCENT AND ADULT: Primary | ICD-10-CM

## 2020-10-22 PROCEDURE — 90837 PSYTX W PT 60 MINUTES: CPT | Mod: U7 | Performed by: STUDENT IN AN ORGANIZED HEALTH CARE EDUCATION/TRAINING PROGRAM

## 2020-10-22 PROCEDURE — 99207 PR NO CHARGE LOS: CPT | Performed by: STUDENT IN AN ORGANIZED HEALTH CARE EDUCATION/TRAINING PROGRAM

## 2020-10-22 NOTE — PROGRESS NOTES
[facility] -  Case Progress Note    Date of Service: 10/22/20   Name: Silvia Fritz  Preferred name: Isaiah - daniel/roxana  : 1995  Medical Record Number: [id number]  Treating Provider: Saira Whitmore, PhD - Postdoctoral Fellow  Type of Session: Individual   Present in Session: Mekhi Colon  Number of Minutes:  55    Health Maintenance Summary - Mental Health Treatment Plan       Status Date      MENTAL HEALTH TX PLAN Next Due 2020      Done 2020 HIM MENTAL HEALTH TX PLAN SCAN        Video start time: 13:00  Video end time: 13:55    Telemedicine Visit: The patient's condition can be safely assessed and treated via synchronous audio and visual telemedicine encounter.      Reason for Telemedicine Visit: Service provided by telemedicine due to COVID-19 pandemic and [facility] directives.     Originating Site (Patient Location): Patient's home    Distant Site (Provider Location): Provider Remote Setting    Consent:  The patient has verbally consented to: the potential risks and benefits of telemedicine (video visit) versus in person care; bill my insurance or make self-payment for services provided; and responsibility for payment of non-covered services.     Mode of Communication:  Video Conference via Zoom    As the provider I attest to compliance with applicable laws and regulations related to telemedicine.    Current Symptoms/Status:  Incongruence between experienced/expressed gender (male) and birth assigned sex (female), lasting longer than six months and causing clinically significant distress, with endorsement of the following symptoms:   - marked incongruence between experience/expressed gender (male) and primary and secondary sex characteristics  - strong desire to be rid of birth assigned sex characteristics  - strong desire for secondary sex characteristics of gender other than birth assigned sex (male)  - strong desire to be read and treated as gender  other than birth assigned sex (male)  - strong conviction that his feelings/experiences are typical of gender other than birth assigned sex      History of recurrent major depression, with current endorsement of symptoms in the mildly depressed range. Primary symptom of concern for Isaiah is irritability.     History of Autism Spectrum Disorder, diagnosed at 15.     Progress Toward Treatment Goals:   Isaiah open to referral to /[facility]  Isaiah came out to family and friends via letter in June 2020. Received positive feedback and support.  Began testosterone therapy late July 2020 @ [facility]  Scheduled top surgery consult with Dr. Frost 04/2021  Pursuing legal name change - has paperwork but not begun process   Upcoming psych evaluation with [facility], 11/25/20    Intervention: Modality and Description:  Primary intervention was psychoeducation and supportive psychotherapy towards gender goals.   Comprehensive Gender Care Coordinator Mekhi Carmen joined session today to provide some education on top surgery consultation process and answer Isaiah's questions. Provided some basic information on costs of surgery if Isaiah does successfully apply for MA in the new year. Discussed some common surgical goals for Isaiah to consider - scarring, nipple sensation, amount of masculinization Isaiah wants, and how that might affect nipple placement decisions and pre-surgery exercise goals. Discussed the two approaches Isaiah has researched (periareolar vs double incision) and provided some info on considerations for each.   After Mekhi left, discussed desire for name change and gender marker change. Isaiah to try and register for [facility] [place] virtual name change clinic tomorrow. Informed of requirement for ACT letter - to get at 11/2 f/u with [facility],  Isaiah was engaged and responsive to intervention. Booked consult with Dr. Warren during this session.     Interactive  Complexity:  None    Assignment:  [place] for name change clinic  [facility]    DSM-5 Diagnoses:  302.85 Gender Dysphoria in Adolescents and Adults  296.32 - Major Depressive Disorder - recurrent, moderate   299.00 - Autism Spectrum Disorder - by history    Plan/Need for Future Services:  Return for therapy in two weeks to treat diagnosed problem.      Saira Whitmore, PhD - Postdoctoral Fellow

## 2020-10-22 NOTE — PROGRESS NOTES
[facility]:  Care Coordination Note     SITUATION   Patient (Isaiah, he/him) is a 25 year old who is receiving support for:  Consult For (top surgery scheduled) and Clinic Care Coordination - Face To Face  .    BACKGROUND     New pt seeking top surgery, pt goals are to have minimal scarring preferably jeanie.     Met with pt with therapist, virtual visit.     ASSESSMENT     Surgery              [facility] Assessment  [facility] ([facility]) Enrollment: Enrolled  Patient has a therapist: Yes  Name of therapist: Saira Hoffman at [facility]  Letter of support #1: Requested  Surgery being considered: Yes  Mastectomy: Yes          PLAN          Nursing Interventions:   [facility] program and services discussed with patient. Educational surgical packet provided and reviewed with patient. Process for accessing surgery discussed, including: WPATH standards of care, letters of support, treatment plan action steps, [place] insurance process, surgery scheduling, and approximate timeline.   Pt questions answered within scope of practice.     Follow-up plan:  Pt to create list of questions with therapist to bring to consult.        Mekhi Carmen

## 2020-11-03 ENCOUNTER — OFFICE VISIT (OUTPATIENT)
Dept: FAMILY MEDICINE | Facility: CLINIC | Age: 25
End: 2020-11-03
Payer: COMMERCIAL

## 2020-11-03 VITALS
SYSTOLIC BLOOD PRESSURE: 118 MMHG | HEART RATE: 76 BPM | HEIGHT: 65 IN | BODY MASS INDEX: 18.89 KG/M2 | OXYGEN SATURATION: 96 % | TEMPERATURE: 98.8 F | DIASTOLIC BLOOD PRESSURE: 78 MMHG | RESPIRATION RATE: 18 BRPM | WEIGHT: 113.4 LBS

## 2020-11-03 DIAGNOSIS — F64.9 GENDER DYSPHORIA: Primary | ICD-10-CM

## 2020-11-03 LAB
ALBUMIN SERPL-MCNC: 4.7 MG/DL (ref 3.8–5)
ALP SERPL-CCNC: 88.5 U/L (ref 31.7–110.5)
ALT SERPL-CCNC: 6.4 U/L (ref 0–45)
AST SERPL-CCNC: 13.8 U/L (ref 0–45)
BILIRUB SERPL-MCNC: 0.7 MG/DL (ref 0.2–1.3)
BUN SERPL-MCNC: 4.9 MG/DL (ref 7–19)
CALCIUM SERPL-MCNC: 9.7 MG/DL (ref 8.5–10.1)
CHLORIDE SERPLBLD-SCNC: 98.6 MMOL/L (ref 98–110)
CHOLEST SERPL-MCNC: 158.5 MG/DL (ref 0–200)
CHOLEST/HDLC SERPL: 3.2 {RATIO} (ref 0–5)
CO2 SERPL-SCNC: 28 MMOL/L (ref 20–32)
CREAT SERPL-MCNC: 0.8 MG/DL (ref 0.5–1)
GFR SERPL CREATININE-BSD FRML MDRD: >90 ML/MIN/1.7 M2
GLUCOSE SERPL-MCNC: 94.9 MG'DL (ref 70–99)
HDLC SERPL-MCNC: 49.5 MG/DL
HEMOGLOBIN: 15.5 G/DL (ref 11.7–15.7)
LDLC SERPL CALC-MCNC: 97 MG/DL (ref 0–129)
POTASSIUM SERPL-SCNC: 4.4 MMOL/L (ref 3.3–4.5)
PROT SERPL-MCNC: 7.5 G/DL (ref 6.8–8.8)
SODIUM SERPL-SCNC: 134.5 MMOL/L (ref 132.6–141.4)
TRIGL SERPL-MCNC: 61.3 MG/DL (ref 0–150)
VLDL CHOLESTEROL: 12.3 MG/DL (ref 7–32)

## 2020-11-03 PROCEDURE — 85018 HEMOGLOBIN: CPT | Performed by: STUDENT IN AN ORGANIZED HEALTH CARE EDUCATION/TRAINING PROGRAM

## 2020-11-03 PROCEDURE — 80053 COMPREHEN METABOLIC PANEL: CPT | Performed by: STUDENT IN AN ORGANIZED HEALTH CARE EDUCATION/TRAINING PROGRAM

## 2020-11-03 PROCEDURE — 36415 COLL VENOUS BLD VENIPUNCTURE: CPT | Performed by: STUDENT IN AN ORGANIZED HEALTH CARE EDUCATION/TRAINING PROGRAM

## 2020-11-03 PROCEDURE — 99214 OFFICE O/P EST MOD 30 MIN: CPT | Mod: GC | Performed by: STUDENT IN AN ORGANIZED HEALTH CARE EDUCATION/TRAINING PROGRAM

## 2020-11-03 PROCEDURE — 99000 SPECIMEN HANDLING OFFICE-LAB: CPT | Performed by: FAMILY MEDICINE

## 2020-11-03 PROCEDURE — 84403 ASSAY OF TOTAL TESTOSTERONE: CPT | Performed by: FAMILY MEDICINE

## 2020-11-03 PROCEDURE — 80061 LIPID PANEL: CPT | Performed by: STUDENT IN AN ORGANIZED HEALTH CARE EDUCATION/TRAINING PROGRAM

## 2020-11-03 ASSESSMENT — MIFFLIN-ST. JEOR: SCORE: 1252.32

## 2020-11-03 NOTE — PROGRESS NOTES
"       HPI   Isaiah is a 25 year old individual that uses pronouns He/Him/His/Himself that presents today for follow up of:  masculinizing hormone therapy.     Gender identity: Male    Any special concerns today?  no current concerns    On hormones?  YES +++ Shot day of the week? Thursday   Due for labs?  Yes +++ Refills of meds needed?  No    Notes voice has dropped, has been able to put on weight.   Consult for top surgery happening in April 2021, excited about that. Understands the COVID has pushed cases back.     Wonders if patience has decreased/irritability has increased since last visit. Has been trying to go outside more with walking neighbors dogs, using ripstick to exercise. Mood has been fine. Continues following with psychology and psychaitry teams.     No past surgical history on file.    Patient Active Problem List   Diagnosis     Anxiety     ADHD, predominantly inattentive type     Asperger's syndrome     Gender dysphoria     Moderate recurrent major depression (H)       Current Outpatient Medications   Medication Sig Dispense Refill     buPROPion (WELLBUTRIN XL) 150 MG 24 hr tablet Take 150 mg by mouth       needle, disp, 18G X 1\" MISC Use for weekly testosterone injection 25 each 11     Needle, Disp, 25G X 5/8\" MISC Use to inject testosterone weekly. 25 each 11     syringe, disposable, (B-D SYRINGE LUER-TOMY) 1 ML MISC To use with weekly injection 25 each 11     testosterone cypionate (DEPOTESTOSTERONE) 200 MG/ML injection Inject 0.25 mLs (50 mg) Subcutaneous once a week 13 vial 3       History   Smoking Status     Never Smoker   Smokeless Tobacco     Never Used        Allergies   Allergen Reactions     Egg [Chicken-Derived Products (Egg)]      Milk Protein Extract        Problem, Medication and Allergy Lists were reviewed and are current..         Review of Systems:        General    Fat redistribution: YES    Weight change: YES- able to put on weight, feels good HEENT    Voice change: Yes, voice " dropped     Cardiovascular (CV)    Chest Pains: no    Shortness of breath: no Chest    Decreased exercise tolerance:  no    Breast changes/development: no     Gastrointestinal (GI)    Abdominal pain: no    Change in appetite: Hungry more often Skin    Acne or oily skin: YES    Change in hair: no     Genitourinary ()    Abnormal vaginal bleeding: no     Decreased spontaneous erections: not applicable    Change in libido: YES    New sexual partners: no Musculoskeletal    Leg pain or swelling: no     Psychiatric (Psych)    Depression: no    Anxiety/Panic: no    Mood:  Feels like patience level may be narrowing/lessening.                     Physical Exam:   There were no vitals filed for this visit.  BMI= There is no height or weight on file to calculate BMI.   Wt Readings from Last 10 Encounters:   08/12/20 49.2 kg (108 lb 6.4 oz)   07/21/20 48.2 kg (106 lb 3.2 oz)     Appearance: Male appearance and dress    GENERAL:: healthy, alert and no distress  EYES: Eyes grossly normal to inspection, fundi benign and PERRL  CV: regular rates and rhythm, normal S1 S2, no S3 or S4 and no murmur, no click or rub -  ABDOMEN: soft, no tenderness, no  hepatosplenomegaly, no masses, normal bowel sounds  MS: extremities normal- no gross deformities noted, no edema  SKIN: no suspicious lesions, no rashes  NEURO: Normal strength and tone, sensory exam grossly normal, mentation intact and speech normal, reflexes symmetric  Affect: Appropriate/mood-congruent           Labs:   No recent    Assessment and Plan     25 year old transmale presenting for HRT follow-up. Continues on testosterone 50mg SQ weekly (dose adjusted 8/12). Tolerating medication without overt side effects. Notes more masculinizing changes since last evaluation. Some irritability endorsed, no other mood changes noted. No safety concern indicating acute escalation of care. Patient continues following with psychology & psychiatry teams. Plan for midcyle labs after visit,  testosterone, CMP, Hgb, fasting glucose, lipids. Will discuss results and any med changes needing via [email or web address]. Refills available for meds and supplies.      Follow up:  Follow up in 3 months for repeat fasting glucose/lipids, Hgb, LFTs, midcycle testosterone level.     Results by [email or web address]  Questions were elicited and answered.     Margie Murray DO

## 2020-11-03 NOTE — PROGRESS NOTES
Preceptor Attestation:   Patient seen, evaluated and discussed with the resident. I have verified the content of the note, which accurately reflects my assessment of the patient and the plan of care.   Supervising Physician:  Catherine Ortiz MD

## 2020-11-03 NOTE — PATIENT INSTRUCTIONS
After Visit Summary  -mid-cycle labs today  -will contact via [email or web address] regarding results and any dosage adjustments  -Follow-up in 3 months to review symptoms and obtain labs  -Come in sooner for any new concerns.    Margie Dominguez, DO

## 2020-11-04 ENCOUNTER — VIRTUAL VISIT (OUTPATIENT)
Dept: PSYCHOLOGY | Facility: CLINIC | Age: 25
End: 2020-11-04
Payer: COMMERCIAL

## 2020-11-04 DIAGNOSIS — F64.0 GENDER DYSPHORIA IN ADOLESCENT AND ADULT: Primary | ICD-10-CM

## 2020-11-04 PROCEDURE — 90837 PSYTX W PT 60 MINUTES: CPT | Mod: HN | Performed by: STUDENT IN AN ORGANIZED HEALTH CARE EDUCATION/TRAINING PROGRAM

## 2020-11-04 PROCEDURE — 99207 PR NO CHARGE LOS: CPT | Performed by: STUDENT IN AN ORGANIZED HEALTH CARE EDUCATION/TRAINING PROGRAM

## 2020-11-04 NOTE — PROGRESS NOTES
[facility] -  Case Progress Note    Date of Service: 20   Name: Silvia Fritz  Preferred name: Isaiah - daniel/roxana  : 1995  Medical Record Number: [id number]  Treating Provider: Saira Whitmore, PhD - Postdoctoral Fellow  Type of Session: Individual   Present in Session: Isaiah  Number of Minutes:  55    Health Maintenance Summary - Mental Health Treatment Plan       Status Date      MENTAL HEALTH TX PLAN Next Due 2020      Done 2020 HIM MENTAL HEALTH TX PLAN SCAN        Video start time: 15:00  Video end time: 15:55    Telemedicine Visit: The patient's condition can be safely assessed and treated via synchronous audio and visual telemedicine encounter.      Reason for Telemedicine Visit: Service provided by telemedicine due to COVID-19 pandemic and [facility] directives.     Originating Site (Patient Location): Patient's home    Distant Site (Provider Location): Provider Remote Setting    Consent:  The patient has verbally consented to: the potential risks and benefits of telemedicine (video visit) versus in person care; bill my insurance or make self-payment for services provided; and responsibility for payment of non-covered services.     Mode of Communication:  Video Conference via Zoom    As the provider I attest to compliance with applicable laws and regulations related to telemedicine.    Current Symptoms/Status:  Incongruence between experienced/expressed gender (male) and birth assigned sex (female), lasting longer than six months and causing clinically significant distress, with endorsement of the following symptoms:   - marked incongruence between experience/expressed gender (male) and primary and secondary sex characteristics  - strong desire to be rid of birth assigned sex characteristics  - strong desire for secondary sex characteristics of gender other than birth assigned sex (male)  - strong desire to be read and treated as gender other than  birth assigned sex (male)  - strong conviction that his feelings/experiences are typical of gender other than birth assigned sex      History of recurrent major depression, with current endorsement of symptoms in the mildly depressed range. Primary symptom of concern for Isaiah is irritability.     History of Autism Spectrum Disorder, diagnosed at 15.     Progress Toward Treatment Goals:   Isaiah open to referral to /[facility]  Isaiah came out to family and friends via letter in June 2020. Received positive feedback and support.  Began testosterone therapy late July 2020 @ [facility]  Scheduled top surgery consult with Dr. Frost 04/2021  Pursuing legal name change - has paperwork but not begun process   Upcoming psych evaluation with ANA Powell, 11/25/20    Intervention: Modality and Description:  Primary intervention was psychoeducation and supportive psychotherapy towards gender goals.   Isaiah feeling positively that top surgery consultation is scheduled. Reached out to [facility] re: name change clinic and given an appointment in December. Isaiah is now thinking about potential return to work next year and how transition decisions will affect him at work. 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 Given some overlap in colleagues, Isaiah concerned about navigating workplace environments in which some people know his gender identity and others do not. There are core fears around safety for Isaiah, if he is outed at work. Discussed using resource of HR, how to approach HR on this issue closer to the time of Isaiah's start.   Given work status, Isaiah has ~ 5 months at home now with no formal work. Discussed coming up with goals for the winter - has plans to do name change, work with [employer] on insurance issues, and register with [facility] once MA is secured in the new year. Discussed options of volunteering, temp work, etc, and tasked  Isaiah to consider some goals.  Isaiah was engaged and responsive to intervention.     Interactive Complexity:  None    Assignment:  [place] for name change clinic  Set up [facility]    DSM-5 Diagnoses:  302.85 Gender Dysphoria in Adolescents and Adults  296.32 - Major Depressive Disorder - recurrent, moderate (not addressed this session)  299.00 - Autism Spectrum Disorder - by history (not addressed this session)  No diagnosis found.    Plan/Need for Future Services:  Return for therapy in two weeks to treat diagnosed problem.      Saira Whitmore, PhD - Postdoctoral Fellow

## 2020-11-05 LAB — TESTOST SERPL-MCNC: 967 NG/DL (ref 8–60)

## 2020-11-18 ENCOUNTER — VIRTUAL VISIT (OUTPATIENT)
Dept: PSYCHOLOGY | Facility: CLINIC | Age: 25
End: 2020-11-18
Payer: COMMERCIAL

## 2020-11-18 DIAGNOSIS — F64.0 GENDER DYSPHORIA IN ADOLESCENT AND ADULT: Primary | ICD-10-CM

## 2020-11-18 PROCEDURE — 90837 PSYTX W PT 60 MINUTES: CPT | Mod: U7 | Performed by: STUDENT IN AN ORGANIZED HEALTH CARE EDUCATION/TRAINING PROGRAM

## 2020-11-18 NOTE — PROGRESS NOTES
[facility] -  Case Progress Note    Date of Service: 20   Name: Silvia Fritz  Preferred name: Isaiah - daniel/roxana  : 1995  Medical Record Number: [id number]  Treating Provider: Saira Whitmore, PhD - Postdoctoral Fellow  Type of Session: Individual   Present in Session: Isaiah  Number of Minutes:  55    Health Maintenance Summary - Mental Health Treatment Plan       Status Date      MENTAL HEALTH TX PLAN Next Due 2020      Done 2020 HIM MENTAL HEALTH TX PLAN SCAN        Video start time: 15:00  Video end time: 15:55    Telemedicine Visit: The patient's condition can be safely assessed and treated via synchronous audio and visual telemedicine encounter.      Reason for Telemedicine Visit: Service provided by telemedicine due to COVID-19 pandemic and [facility] directives.     Originating Site (Patient Location): Patient's home    Distant Site (Provider Location): Provider Remote Setting    Consent:  The patient has verbally consented to: the potential risks and benefits of telemedicine (video visit) versus in person care; bill my insurance or make self-payment for services provided; and responsibility for payment of non-covered services.     Mode of Communication:  Video Conference via Zoom    As the provider I attest to compliance with applicable laws and regulations related to telemedicine.    Current Symptoms/Status:  Incongruence between experienced/expressed gender (male) and birth assigned sex (female), lasting longer than six months and causing clinically significant distress, with endorsement of the following symptoms:   - marked incongruence between experience/expressed gender (male) and primary and secondary sex characteristics  - strong desire to be rid of birth assigned sex characteristics  - strong desire for secondary sex characteristics of gender other than birth assigned sex (male)  - strong desire to be read and treated as gender other than  "birth assigned sex (male)  - strong conviction that his feelings/experiences are typical of gender other than birth assigned sex      History of recurrent major depression, with current endorsement of symptoms in the mildly depressed range. Primary symptom of concern for Isaiah is irritability.     History of Autism Spectrum Disorder, diagnosed at 15.     Progress Toward Treatment Goals:   Iasiah open to referral to /[facility]  Isaiah came out to family and friends via letter in June 2020. Received positive feedback and support.  Began testosterone therapy late July 2020 @ [facility]  Scheduled top surgery consult with Dr. Warren 04/2021  Registered for [facility] name change clinic   Upcoming psych evaluation with ANA Powell, 11/25/20    Intervention: Modality and Description:  Primary intervention was psychoeducation and supportive psychotherapy towards gender goals.   Isaiah registered for December name change clinic. Has been in contact with [facility] to provide required information. Discussed referral to insurance navigators at [facility] and recommended outreach this week.   Isaiah expressed desire to search for a new job in January, which is a positive development. Uncertain of specific areas of interest. For the next few weeks, has pet and childcare work.   Discussed preparing for family Thanksgiving. Supported Isaiah in thinking about his boundaries around risk tolerance re: Covid, as he and family may differ in this regard. Isaiah noted he often has challenges with one specific sibling making transphobic comments. Feels inconsistently supported by parents here, and expressed a desire to work on how he responds to these comments. Practiced responses that communicate his boundaries. Practice skills to slow down (e.g. breathing, self-talk) to support Isaiah's goal of \"thinking about how to respond first.\"   Isaiah was engaged and responsive to intervention.     Interactive " Complexity:  None    Assignment:  [place] for name change clinic  Set up [facility]    DSM-5 Diagnoses:  302.85 Gender Dysphoria in Adolescents and Adults  296.32 - Major Depressive Disorder - recurrent, moderate (not addressed this session)  299.00 - Autism Spectrum Disorder - by history (not addressed this session)  No diagnosis found.    Plan/Need for Future Services:  Return for therapy in two weeks to treat diagnosed problem.      Saira Whitmore, PhD - Postdoctoral Fellow

## 2020-11-19 ENCOUNTER — MYC REFILL (OUTPATIENT)
Dept: FAMILY MEDICINE | Facility: CLINIC | Age: 25
End: 2020-11-19

## 2020-11-19 DIAGNOSIS — F64.9 GENDER DYSPHORIA: ICD-10-CM

## 2020-11-19 RX ORDER — TESTOSTERONE CYPIONATE 200 MG/ML
50 INJECTION, SOLUTION INTRAMUSCULAR WEEKLY
Qty: 13 VIAL | Refills: 3 | Status: SHIPPED | OUTPATIENT
Start: 2020-11-19 | End: 2020-12-08

## 2020-11-22 RX ORDER — SYRINGE, DISPOSABLE, 1 ML
SYRINGE, EMPTY DISPOSABLE MISCELLANEOUS
Qty: 25 EACH | Refills: 11 | Status: SHIPPED | OUTPATIENT
Start: 2020-11-22 | End: 2021-08-09

## 2020-11-25 ENCOUNTER — TELEPHONE (OUTPATIENT)
Dept: PSYCHIATRY | Facility: CLINIC | Age: 25
End: 2020-11-25

## 2020-11-25 NOTE — TELEPHONE ENCOUNTER
Pt did not respond to Doxy invite, called [phone].  Pt reported he overslept and he wanted to reschedule.  Instruct pt to call [facility] to reschedule intake appt. Cheryl Duong CNP, 11/25/2020

## 2020-12-02 ENCOUNTER — VIRTUAL VISIT (OUTPATIENT)
Dept: PSYCHOLOGY | Facility: CLINIC | Age: 25
End: 2020-12-02
Payer: COMMERCIAL

## 2020-12-02 DIAGNOSIS — F64.0 GENDER DYSPHORIA IN ADOLESCENT AND ADULT: Primary | ICD-10-CM

## 2020-12-02 PROCEDURE — 90834 PSYTX W PT 45 MINUTES: CPT | Mod: U7 | Performed by: STUDENT IN AN ORGANIZED HEALTH CARE EDUCATION/TRAINING PROGRAM

## 2020-12-02 NOTE — PROGRESS NOTES
[facility] -  Case Progress Note    Date of Service: 20   Name: Silvia Fritz  Preferred name: Isaiah - daniel/roxana  : 1995  Medical Record Number: [id number]  Treating Provider: Saira Whitmore, PhD - Postdoctoral Fellow  Type of Session: Individual   Present in Session: Isaiah  Number of Minutes:  38    The author of this note documented a reason for not sharing it with the patient.    Health Maintenance Summary - Mental Health Treatment Plan       Status Date      MENTAL HEALTH TX PLAN Next Due 2020      Done 2020 HIM MENTAL HEALTH TX PLAN SCAN        Video start time: 15:10  Video end time: 15:48    Telemedicine Visit: The patient's condition can be safely assessed and treated via synchronous audio and visual telemedicine encounter.      Reason for Telemedicine Visit: Service provided by telemedicine due to COVID-19 pandemic and [facility] health directives.     Originating Site (Patient Location): Patient's home    Distant Site (Provider Location): Provider Remote Setting    Consent:  The patient has verbally consented to: the potential risks and benefits of telemedicine (video visit) versus in person care; bill my insurance or make self-payment for services provided; and responsibility for payment of non-covered services.     Mode of Communication:  Video Conference via [email or web address]    As the provider I attest to compliance with applicable laws and regulations related to telemedicine.    Current Symptoms/Status:  Incongruence between experienced/expressed gender (male) and birth assigned sex (female), lasting longer than six months and causing clinically significant distress, with endorsement of the following symptoms:   - marked incongruence between experience/expressed gender (male) and primary and secondary sex characteristics  - strong desire to be rid of birth assigned sex characteristics  - strong desire for secondary sex characteristics of gender other than  birth assigned sex (male)  - strong desire to be read and treated as gender other than birth assigned sex (male)  - strong conviction that his feelings/experiences are typical of gender other than birth assigned sex      History of recurrent major depression, with current endorsement of symptoms in the mildly depressed range. Primary symptom of concern for Isaiah is irritability.     History of Autism Spectrum Disorder, diagnosed at 15.     Progress Toward Treatment Goals:   Isaiah open to referral to /[facility]  Isaiah came out to family and friends via letter in June 2020. Received positive feedback and support.  Began testosterone therapy late July 2020 @ [facility]  Scheduled top surgery consult with Dr. Warren 04/2021  Registered for [facility] name change clinic on Dec 11  Upcoming psych evaluation with ANA Powell, 12/23/20    Intervention: Modality and Description:  Primary intervention was psychoeducation and supportive psychotherapy towards gender goals.   Discussed medication issue (refills not appearing sufficient) and how to address with provider at [facility]. Isaiah is preparing documents for appointment w/ [facility] name change clinic. Discussed choice of middle name, and some conflict about keeping a family name vs choosing for himself. Isaiah struggles with feeling rejected by family, and is conflicted re: the extent to which he wants to maintain relationships. Discussed future plans of moving outside the home. Challenged some of Isaiah's assumptions/automatic thoughts about the feasibility of a plan to live independently.    Isaiah was engaged and responsive to intervention.     Interactive Complexity:  None    Assignment:  Reach out to [facility] insurance navigator  Prepare documents for name change clinic  Call HRT provider for prescription issue    DSM-5 Diagnoses:  302.85 Gender Dysphoria in Adolescents and Adults  296.32 - Major Depressive Disorder -  recurrent, moderate (not addressed this session)  299.00 - Autism Spectrum Disorder - by history (not addressed this session)  No diagnosis found.    Plan/Need for Future Services:  Return for therapy in two weeks to treat diagnosed problem.      Saira Whitmore, PhD - Postdoctoral Fellow

## 2020-12-17 ENCOUNTER — VIRTUAL VISIT (OUTPATIENT)
Dept: PSYCHOLOGY | Facility: CLINIC | Age: 25
End: 2020-12-17
Payer: COMMERCIAL

## 2020-12-17 DIAGNOSIS — F64.0 GENDER DYSPHORIA IN ADOLESCENT AND ADULT: Primary | ICD-10-CM

## 2020-12-17 PROCEDURE — 99207 PR NO CHARGE LOS: CPT | Performed by: STUDENT IN AN ORGANIZED HEALTH CARE EDUCATION/TRAINING PROGRAM

## 2020-12-17 PROCEDURE — 90837 PSYTX W PT 60 MINUTES: CPT | Mod: U7 | Performed by: STUDENT IN AN ORGANIZED HEALTH CARE EDUCATION/TRAINING PROGRAM

## 2020-12-17 NOTE — PROGRESS NOTES
[facility] -  Case Progress Note    Date of Service: 20   Name: Silvia Fritz  Preferred name: Isaiah - daniel/roxana  : 1995  Medical Record Number: [id number]  Treating Provider: Saira Whitmore, PhD - Postdoctoral Fellow  Type of Session: Individual   Present in Session: Isaiah  Number of Minutes:  55  DA Completed: 20  Tx Plan Completed: 20    The author of this note documented a reason for not sharing it with the patient.    Health Maintenance Summary - Mental Health Treatment Plan       Status Date      MENTAL HEALTH TX PLAN Next Due 2020      Done 2020 HIM MENTAL HEALTH TX PLAN SCAN        Video start time: 15:30  Video end time: 16:25    Telemedicine Visit: The patient's condition can be safely assessed and treated via synchronous audio and visual telemedicine encounter.      Reason for Telemedicine Visit: Service provided by telemedicine due to COVID-19 pandemic and [facility] health directives.     Originating Site (Patient Location): Patient's home    Distant Site (Provider Location): Provider Remote Setting    Consent:  The patient has verbally consented to: the potential risks and benefits of telemedicine (video visit) versus in person care; bill my insurance or make self-payment for services provided; and responsibility for payment of non-covered services.     Mode of Communication:  Video Conference via [email or web address]    As the provider I attest to compliance with applicable laws and regulations related to telemedicine.    Current Symptoms/Status:  Incongruence between experienced/expressed gender (male) and birth assigned sex (female), lasting longer than six months and causing clinically significant distress, with endorsement of the following symptoms:   - marked incongruence between experience/expressed gender (male) and primary and secondary sex characteristics  - strong desire to be rid of birth assigned sex characteristics  - strong desire for  "secondary sex characteristics of gender other than birth assigned sex (male)  - strong desire to be read and treated as gender other than birth assigned sex (male)  - strong conviction that his feelings/experiences are typical of gender other than birth assigned sex      History of recurrent major depression, with current endorsement of symptoms in the mildly depressed range. Primary symptom of concern for Isaiah is irritability.     History of Autism Spectrum Disorder, diagnosed at 15.     Progress Toward Treatment Goals:   Isaiah open to referral to /[facility]  Isaiah came out to family and friends via letter in June 2020. Received positive feedback and support.  Began testosterone therapy late July 2020 @ [facility]  Scheduled top surgery consult with Dr. Warren 04/2021  Registered for [facility] name change clinic on Dec 11  Upcoming psych evaluation with ANA Powell, 12/23/20    Intervention: Modality and Description:  Primary intervention was psychoeducation and supportive psychotherapy towards gender goals.   Isaiah had name change information appointment with [facility] and is moving forward with the paperwork. Sorted out testosterone prescription issue with PCP and pharmacy and has no further issues. Discussed family issues with upcoming holiday. Explored part of him that wants to \"cut and run,\" and part that wants to work on relationships (specifically with mom, dad, one sister). Challenged Isaiah to identify his role in relationship repair - has a tendency to dwell on others' wrongdoings (dario about perceived response to gender transition). Identified that he wants to work on taking more of an interest in sister's life, and \"stop saying hurtful things\" to mother. Explored relationship issues with dad, and noted that some of challenge relates to some difficulty with perspective-taking.   Isaiah was engaged and responsive to intervention.     Interactive " Complexity:  None    Assignment:  Reach out to [facility] insurance navigator  Prepare documents for name change clinic  Call HRT provider for prescription issue    DSM-5 Diagnoses:  302.85 Gender Dysphoria in Adolescents and Adults  296.32 - Major Depressive Disorder - recurrent, moderate (not addressed this session)  299.00 - Autism Spectrum Disorder - by history (not addressed this session)  No diagnosis found.    Plan/Need for Future Services:  Return for therapy in two weeks to treat diagnosed problem.      Saira Whitmore, PhD - Postdoctoral Fellow

## 2020-12-30 ENCOUNTER — VIRTUAL VISIT (OUTPATIENT)
Dept: PSYCHOLOGY | Facility: CLINIC | Age: 25
End: 2020-12-30
Payer: COMMERCIAL

## 2020-12-30 DIAGNOSIS — F33.1 MAJOR DEPRESSIVE DISORDER, RECURRENT EPISODE, MODERATE (H): Primary | ICD-10-CM

## 2020-12-30 DIAGNOSIS — F64.0 GENDER DYSPHORIA IN ADOLESCENT AND ADULT: ICD-10-CM

## 2020-12-30 PROCEDURE — 99207 PR NO BILLABLE SERVICE THIS VISIT: CPT | Performed by: STUDENT IN AN ORGANIZED HEALTH CARE EDUCATION/TRAINING PROGRAM

## 2020-12-30 PROCEDURE — 90834 PSYTX W PT 45 MINUTES: CPT | Mod: U7 | Performed by: STUDENT IN AN ORGANIZED HEALTH CARE EDUCATION/TRAINING PROGRAM

## 2020-12-30 NOTE — PROGRESS NOTES
[facility] -  Case Progress Note    Date of Service: 20   Name: Silvia Fritz  Preferred name: Isaiah - he/roxana  : 1995  Medical Record Number: [id number]  Treating Provider: Saira Whitmore, PhD - Postdoctoral Fellow  Type of Session: Individual   Present in Session: Isaiah  Number of Minutes:  40  DA Completed: 20   Tx Plan Completed: 20    The author of this note documented a reason for not sharing it with the patient.    Video start time: 16:30  Video end time: 17:20    Telemedicine Visit: The patient's condition can be safely assessed and treated via synchronous audio and visual telemedicine encounter.      Reason for Telemedicine Visit: Service provided by telemedicine due to COVID-19 pandemic and [facility] directives.     Originating Site (Patient Location): Patient's home    Distant Site (Provider Location): Provider Remote Setting    Consent:  The patient has verbally consented to: the potential risks and benefits of telemedicine (video visit) versus in person care; bill my insurance or make self-payment for services provided; and responsibility for payment of non-covered services.     Mode of Communication:  Video Conference via [email or web address]    As the provider I attest to compliance with applicable laws and regulations related to telemedicine.    Current Symptoms/Status:  Incongruence between experienced/expressed gender (male) and birth assigned sex (female), lasting longer than six months and causing clinically significant distress, with endorsement of the following symptoms:   - marked incongruence between experience/expressed gender (male) and primary and secondary sex characteristics  - strong desire to be rid of birth assigned sex characteristics  - strong desire for secondary sex characteristics of gender other than birth assigned sex (male)  - strong desire to be read and treated as gender other than birth assigned sex (male)  - strong  conviction that his feelings/experiences are typical of gender other than birth assigned sex      History of recurrent major depression, with current endorsement of symptoms in the mildly depressed range. Primary symptom of concern for Isaiah is irritability.     History of Autism Spectrum Disorder, diagnosed at 15.     Progress Toward Treatment Goals:   Isaiah open to referral to /[facility]  Isaiah came out to family and friends via letter in June 2020. Received positive feedback and support.  Began testosterone therapy late July 2020 @ [facility]  Scheduled top surgery consult with Dr. Warren 04/2021  Attended [facility] [place] [facility] on [place] 11 and in process of completing   Upcoming psych evaluation with ANA Powell, 1/20/20    Intervention: Modality and Description:  Primary intervention was psychoeducation and supportive psychotherapy towards gender goals.   Isaiah still in touch with [facility]  from name [facility] and will be doing paperwork. Not yet in touch with [employer] - emailed without response - encouraged follow up. Discussed Isaiah's thoughts about his job searching plan. Has passions in child and animal care, and jobs that involve physical activity. Would like a job with set hours and benefits vs current work, but believes he can't do better. Confronted Isaiah about this pattern of self-defeating thinking that results in him not trying. Able to take in this feedback.   Isaiah was engaged and responsive to intervention.     Interactive Complexity:  None    Assignment:  Reach out to [employer] insurance navigator  Prepare documents for name change [facility]  Call HRT provider for prescription issue    DSM-5 Diagnoses:  302.85 Gender Dysphoria in Adolescents and Adults  296.32 - Major Depressive Disorder - recurrent, moderate   299.00 - Autism Spectrum Disorder - by history (not addressed this session)  No diagnosis  found.    Plan/Need for Future Services:  Return for therapy in two weeks to treat diagnosed problem.      Saira Whitmore, PhD - Postdoctoral Fellow

## 2021-01-04 ENCOUNTER — HEALTH MAINTENANCE LETTER (OUTPATIENT)
Age: 26
End: 2021-01-04

## 2021-01-13 ENCOUNTER — VIRTUAL VISIT (OUTPATIENT)
Dept: PSYCHOLOGY | Facility: CLINIC | Age: 26
End: 2021-01-13
Payer: COMMERCIAL

## 2021-01-13 DIAGNOSIS — F64.0 GENDER DYSPHORIA IN ADOLESCENT AND ADULT: Primary | ICD-10-CM

## 2021-01-13 PROCEDURE — 90834 PSYTX W PT 45 MINUTES: CPT | Mod: U7 | Performed by: STUDENT IN AN ORGANIZED HEALTH CARE EDUCATION/TRAINING PROGRAM

## 2021-01-13 PROCEDURE — 99207 PR NO BILLABLE SERVICE THIS VISIT: CPT | Performed by: STUDENT IN AN ORGANIZED HEALTH CARE EDUCATION/TRAINING PROGRAM

## 2021-01-13 NOTE — PROGRESS NOTES
[facility] -  Case Progress Note    Date of Service: 21   Name: Silvia Fritz  Preferred name: Isaiah - he/roxana  : 1995  Medical Record Number: [id number]  Treating Provider: Saira Whitmore, PhD - Postdoctoral Fellow  Type of Session: Individual   Present in Session: Isaiah  Number of Minutes:  50  Diagnostic Assessment: 20  Treatment Plan: 20    The author of this note documented a reason for not sharing it with the patient.    Video start time: 15:00  Video end time: 15:50    Telemedicine Visit: The patient's condition can be safely assessed and treated via synchronous audio and visual telemedicine encounter.      Reason for Telemedicine Visit: Service provided by telemedicine due to COVID-19 pandemic and [facility] directives.     Originating Site (Patient Location): Patient's home    Distant Site (Provider Location): Provider Remote Setting    Consent:  The patient has verbally consented to: the potential risks and benefits of telemedicine (video visit) versus in person care; bill my insurance or make self-payment for services provided; and responsibility for payment of non-covered services.     Mode of Communication:  Video Conference via [email or web address]    As the provider I attest to compliance with applicable laws and regulations related to telemedicine.    Current Symptoms/Status:  Incongruence between experienced/expressed gender (male) and birth assigned sex (female), lasting longer than six months and causing clinically significant distress, with endorsement of the following symptoms:   - marked incongruence between experience/expressed gender (male) and primary and secondary sex characteristics  - strong desire to be rid of birth assigned sex characteristics  - strong desire for secondary sex characteristics of gender other than birth assigned sex (male)  - strong desire to be read and treated as gender other than birth assigned sex (male)  - strong  conviction that his feelings/experiences are typical of gender other than birth assigned sex      History of recurrent major depression, with current endorsement of symptoms in the mildly depressed range. Primary symptom of concern for Isaiah is irritability.     History of Autism Spectrum Disorder, diagnosed at 15.     Progress Toward Treatment Goals:   Pursuing insurance navigator support through [employer]  Isaiah came out to family and friends via letter in June 2020. Received positive feedback and support.  Began testosterone therapy late July 2020 @ [facility]  Scheduled top surgery consult with Dr. Warren 04/2021  Registered for [facility] [place] name change clinic on Dec 11  Upcoming psych evaluation with ANA Powell    Intervention: Modality and Description:  Primary intervention was psychoeducation and supportive psychotherapy towards gender goals.   Isaiah not yet followed up with [facility] - encouraged ASAP given insurance expiring in March. Has received name change paperwork and is completing at home. Recommended [facility] as a possibility for [facility] support.   Isaiah raised concern w/ Testosterone - not seeing effects as quickly as hoped. Offered psychoeducation on range of timelines, and discussed how to present this concern to physician at upcoming appointment. Recommended reviewing with her timeline ranges for testosterone, discussing other options for menstrual suppression, and inquiring about lifestyle options (e.g. exercise programs) that can help develop some of the desired changes around muscle mass. Encouraged Isaiah to do some internet searching for exercise programs for trans men.   Isaiah was engaged and responsive to intervention.     Interactive Complexity:  None    Assignment:  Reach out to [employer] insurance navigator  Prepare documents for name change clinic  Call HRT provider for prescription issue    DSM-5 Diagnoses:  302.85 Gender Dysphoria  in Adolescents and Adults  296.32 - Major Depressive Disorder - recurrent, moderate (not addressed this session)  299.00 - Autism Spectrum Disorder - by history (not addressed this session)  No diagnosis found.    Plan/Need for Future Services:  Return for therapy in two weeks to treat diagnosed problem.      Saira Whitmore, PhD - Postdoctoral Fellow

## 2021-01-14 NOTE — TELEPHONE ENCOUNTER
FUTURE VISIT INFORMATION      FUTURE VISIT INFORMATION:    Date: 4/13/21    Time: 9:00am    Location: [facility]  REFERRAL INFORMATION:    Referring provider:  self    Referring providers clinic:  N/A    Reason for visit/diagnosis  Top consult    RECORDS REQUESTED FROM:       No recs to collect

## 2021-01-19 ASSESSMENT — ANXIETY QUESTIONNAIRES
7. FEELING AFRAID AS IF SOMETHING AWFUL MIGHT HAPPEN: NOT AT ALL
6. BECOMING EASILY ANNOYED OR IRRITABLE: SEVERAL DAYS
5. BEING SO RESTLESS THAT IT IS HARD TO SIT STILL: SEVERAL DAYS
2. NOT BEING ABLE TO STOP OR CONTROL WORRYING: NOT AT ALL
3. WORRYING TOO MUCH ABOUT DIFFERENT THINGS: NOT AT ALL
GAD7 TOTAL SCORE: 2
1. FEELING NERVOUS, ANXIOUS, OR ON EDGE: NOT AT ALL

## 2021-01-19 ASSESSMENT — PATIENT HEALTH QUESTIONNAIRE - PHQ9
SUM OF ALL RESPONSES TO PHQ QUESTIONS 1-9: 8
5. POOR APPETITE OR OVEREATING: NOT AT ALL

## 2021-01-20 ENCOUNTER — VIRTUAL VISIT (OUTPATIENT)
Dept: PSYCHIATRY | Facility: CLINIC | Age: 26
End: 2021-01-20
Payer: COMMERCIAL

## 2021-01-20 DIAGNOSIS — F98.8 ATTENTION DEFICIT DISORDER, UNSPECIFIED HYPERACTIVITY PRESENCE: Primary | ICD-10-CM

## 2021-01-20 DIAGNOSIS — F84.0 AUTISM: ICD-10-CM

## 2021-01-20 PROCEDURE — 90792 PSYCH DIAG EVAL W/MED SRVCS: CPT | Mod: 95 | Performed by: NURSE PRACTITIONER

## 2021-01-20 RX ORDER — ATOMOXETINE 10 MG/1
10 CAPSULE ORAL DAILY
Qty: 30 CAPSULE | Refills: 1 | Status: SHIPPED | OUTPATIENT
Start: 2021-01-20 | End: 2021-02-17

## 2021-01-20 ASSESSMENT — ANXIETY QUESTIONNAIRES: GAD7 TOTAL SCORE: 2

## 2021-01-20 NOTE — PATIENT INSTRUCTIONS
-Start Strattera 10 mg daily for ADD.  Monitor your mood and anxiety    Your next appointment is scheduled on 2/17/2021 (Wed) at 11:30am.    To access your telemedicine visit:     Open a web browser, like [employer], and type [email or web address]     You will see a box asking you to check in to let Cherylchester Duong know that you are here.     Type in your name and press Check In. That will let Cheryl see you in the virtual waiting room. At your scheduled appointment time, your provider will initiate the visit and connect you.     When your visit is done, you can simply close the browser window.        Please Note:  Ideally, you will connect from a desktop, laptop, or tablet with a WiFi connection. Your computer/tablet must have a camera and microphone. You can use a cell phone, if it has a camera, and if you can connect to WiFi. However, if you connect your phone over a cellular network, it is of lower quality and less reliable
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
## 2021-01-21 ENCOUNTER — DOCUMENTATION ONLY (OUTPATIENT)
Dept: FAMILY MEDICINE | Facility: CLINIC | Age: 26
End: 2021-01-21

## 2021-01-21 NOTE — PROGRESS NOTES
Approved: Atomoxetine HCI  Dose: 10 mg    1/21/2021 - 1/21/2024    [id number] sliding scale      Anamika Leiva CMA      Pharmacy notified

## 2021-01-27 ENCOUNTER — VIRTUAL VISIT (OUTPATIENT)
Dept: PSYCHOLOGY | Facility: CLINIC | Age: 26
End: 2021-01-27
Payer: COMMERCIAL

## 2021-01-27 DIAGNOSIS — F64.0 GENDER DYSPHORIA IN ADOLESCENT AND ADULT: Primary | ICD-10-CM

## 2021-01-27 DIAGNOSIS — F33.1 MAJOR DEPRESSIVE DISORDER, RECURRENT EPISODE, MODERATE (H): ICD-10-CM

## 2021-01-27 DIAGNOSIS — F90.0 ATTENTION DEFICIT HYPERACTIVITY DISORDER (ADHD), PREDOMINANTLY INATTENTIVE TYPE: ICD-10-CM

## 2021-01-27 DIAGNOSIS — F84.0 AUTISM SPECTRUM: ICD-10-CM

## 2021-01-27 PROCEDURE — 99207 PR NO BILLABLE SERVICE THIS VISIT: CPT | Performed by: STUDENT IN AN ORGANIZED HEALTH CARE EDUCATION/TRAINING PROGRAM

## 2021-01-27 PROCEDURE — 90791 PSYCH DIAGNOSTIC EVALUATION: CPT | Mod: U7 | Performed by: STUDENT IN AN ORGANIZED HEALTH CARE EDUCATION/TRAINING PROGRAM

## 2021-01-27 NOTE — PROGRESS NOTES
[facility]          [address] [place] [address] [address]  [place] [address]                                                                                Phone: [phone]                                                                                  Fax: [phone]                                                                    [email or web address]    ANNUAL UPDATE: Diagnostic Assessment Interview (updated 4/3/2017)    Date of Service: 1/27/21  Client Name: Silvia Fritz (Isaiah)  YOB: 1995  25 year old  MRN:  [id number]  Gender/Gender Identity: Transmasculine  Treating Provider: Saira Whitmore, PhD - Postdoctoral Fellow   Program: LATOSHA  Type of Session: Assessment  Present in Session: Isaiah  Number of Minutes:  55    Video start time: 15:00  Video end time: 15:55    Telemedicine Visit: The patient's condition can be safely assessed and treated via synchronous audio and visual telemedicine encounter.      Reason for Telemedicine Visit: Services only offered telehealth    Originating Site (Patient Location): Patient's home    Distant Site (Provider Location): Provider Remote Setting    Consent:  The patient/guardian has verbally consented to: the potential risks and benefits of telemedicine (video visit) versus in person care; bill my insurance or make self-payment for services provided; and responsibility for payment of non-covered services.     Mode of Communication:  Video Conference via [email or web address]    As the provider I attest to compliance with applicable laws and regulations related to telemedicine.    Updated Presenting Problem and Goals:    Isaiah has been seen for 25 sessions of individual therapy on a consistent bi-weekly schedule since his initial intake. Presenting complaint at intake was gender dysphoria and a desire to socially and physically transition in alignment with his binary masculine gender identity. In the past year, Isaiah has made progress toward  these goals. He began testosterone therapy under the care of [facility] and has a consultation for top surgery scheduled. He is adjusting well to HRT. He came out to family and friends via a letter and is now socially transitioned in all settings except work (but has not worked since Covid-19 pandemic). Isaiah is in the process of legal name change and has done this with support of [facility].     Isaiah's current goals are as follows: 1) secure independent insurance with support of [facility] insurance navigators (in progress); 2) continue process of top surgery; 3) talk about lower surgery; 4) continue talking about work goals. Isaiah has not worked since Covid-19 and remains at home with parents where there is significant tension. Isaiah expresses desire to move out and be independent but struggles with follow through on steps that could help him reach that goal. He tends to hold a negativistic outlook on himself and his future, and engages in some self-defeating patterns. He could benefit from outside [facility] and career coaching through the [facility].     Updated Mental Health History:     Isaiah has a history of Autism Spectrum Disorder, ADHD, and depression. As of January 2021, he is established with ANA Ramirez at [facility]. Current med (not yet started) is Strattera. Stopped Wellbutrin several months ago.    PHQ 10/26/2020 11/24/2020 1/19/2021   PHQ-9 Total Score 3 3 8   Q9: Thoughts of better off dead/self-harm past 2 weeks Not at all Not at all Not at all     GWEN-7 SCORE 10/26/2020 11/24/2020 1/19/2021   Total Score 3 3 2     Updated Substance Use:     No substance use.    Updated Medical History:     Started Testosterone in July 2021. No other updates to medical history.    Updated Family History:     No significant family medical, mental health, substance use.     Isaiah continues to experience significant conflict in relationship with parents. He lives at  home with parents but remains isolated from the family. He believes that parents do not truly accept his trans identity. There is significant conflict with parents about his employment status and stagnation with respect to job advancement and independent living.     Updated Current Significant Relationship/Partner:    No updates. Isaiah is not dating or partnered.    Updated Educational History:    No updates.    Updated Occupational History:    Isaiah's employment at [employer] and [employer] were impacted by Covid. He did not work last season. He has been hesitant to seeking other job opportunities out of loyalty to these workplaces. He is open to a referral to [facility] for support in job searching and independent living. He recently expressed interest in seeing services through [facility] [place]'s job assistance program.     Updated Legal Issues:    No arrests or convictions. Doing legal name and gender marker change.     ________________________________________________________________  CONCLUSIONS    Updated Symptoms:  Incongruence between experienced/expressed gender (male) and birth assigned sex (female), lasting longer than six months and causing clinically significant distress, with endorsement of the following symptoms:   - marked incongruence between experience/expressed gender (male) and primary and secondary sex characteristics  - strong desire to be rid of birth assigned sex characteristics  - strong desire for secondary sex characteristics of gender other than birth assigned sex (male)  - strong desire to be read and treated as gender other than birth assigned sex (male)  - strong conviction that his feelings/experiences are typical of gender other than birth assigned sex      History of recurrent major depression, with current endorsement of symptoms in the mildly depressed range. Primary symptom of concern for Isaiah is irritability.      History of Autism Spectrum Disorder and ADHD.      Updated  Mental Status:      Mental Status:   Appearance:  Casually dressed and Well groomed  Behavior/relationship to examiner/demeanor:  Cooperative and Engaged  Orientation: Oriented to person, place, time and situation  Speech Rate:  Normal  Speech Spontaneity:  Normal  Mood:  neutral  Affect:  Restricted  Thought Process (Associations):  Logical, Linear and Goal directed  Thought Content:  no evidence of suicidal or homicidal ideation, patient denies auditory and visual hallucinations and patient does not appear to be responding to internal stimuli  Abnormal Perception:  None  Attention/Concentration:  Normal  Language:  Intact  Insight:  Fair  Judgment:  Fair        Interactive Complexity:  Communication difficulties present during the current psychiatric procedure included:  1. 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 Based on the client's current report of symptoms, client continues to meet criteria for Gender Dysphoria and Major Depression (recurrent, moderate). The diagnosis of Autism Spectrum Disorder remains active by history, and the diagnosis of ADHD predominantly inattentive type is added by history, per psychiatric evaluation. The client's gender and mental health concerns continue to affect client's ability to function in social and occupational settings and have been causing clinically significant distress. The client reports no drug or alcohol use and no current safety issues. Client has limited social support and would benefit from additional support through . Based on the client's reported symptoms and impact on functioning,  the plan for the patient is:  1. Continue supportive individual therapy with Saira Whitmore, PhD. Therapy should focus on working towards gender goals and connecting with other support services to facilitate occupational and independent living goals.   2. Continue care with ANA Powell for medication management.   3. Consider ways of increasing client's social support through connection to the LGBT community.  4. Continue to assess the impact of client's family and relational patterns on their current well-being.   5. Coordinate care as needed with medical providers: [facility], Dr. Kian Warren (upcoming), and ANA Powell.        Saira Whitmore, PhD

## 2021-02-02 ENCOUNTER — OFFICE VISIT (OUTPATIENT)
Dept: FAMILY MEDICINE | Facility: CLINIC | Age: 26
End: 2021-02-02
Payer: COMMERCIAL

## 2021-02-02 VITALS
DIASTOLIC BLOOD PRESSURE: 78 MMHG | BODY MASS INDEX: 18.59 KG/M2 | SYSTOLIC BLOOD PRESSURE: 111 MMHG | RESPIRATION RATE: 16 BRPM | HEART RATE: 68 BPM | WEIGHT: 110 LBS | OXYGEN SATURATION: 99 % | TEMPERATURE: 98.4 F

## 2021-02-02 DIAGNOSIS — F64.9 GENDER DYSPHORIA: Primary | ICD-10-CM

## 2021-02-02 LAB
% GRANULOCYTES: 71.9 %G (ref 40–75)
ALBUMIN SERPL-MCNC: 4.5 MG/DL (ref 3.8–5)
ALP SERPL-CCNC: 84.2 U/L (ref 31.7–110.5)
ALT SERPL-CCNC: 3 U/L (ref 0–45)
AST SERPL-CCNC: 11.9 U/L (ref 0–45)
BILIRUB SERPL-MCNC: 0.8 MG/DL (ref 0.2–1.3)
BUN SERPL-MCNC: 10.4 MG/DL (ref 7–19)
CALCIUM SERPL-MCNC: 9.3 MG/DL (ref 8.5–10.1)
CHLORIDE SERPLBLD-SCNC: 105.7 MMOL/L (ref 98–110)
CO2 SERPL-SCNC: 23.2 MMOL/L (ref 20–32)
CREAT SERPL-MCNC: 0.8 MG/DL (ref 0.5–1)
GFR SERPL CREATININE-BSD FRML MDRD: >90 ML/MIN/1.7 M2
GLUCOSE SERPL-MCNC: 90.5 MG'DL (ref 70–99)
GRANULOCYTES #: 4.3 K/UL (ref 1.6–8.3)
HCT VFR BLD AUTO: 47.5 % (ref 35–47)
HEMOGLOBIN: 14.1 G/DL (ref 11.7–15.7)
LYMPHOCYTES # BLD AUTO: 1.4 K/UL (ref 0.8–5.3)
LYMPHOCYTES NFR BLD AUTO: 23.8 %L (ref 20–48)
MCH RBC QN AUTO: 31 PG (ref 26.5–35)
MCHC RBC AUTO-ENTMCNC: 29.7 G/DL (ref 32–36)
MCV RBC AUTO: 104.4 FL (ref 78–100)
MID #: 0.3 K/UL (ref 0–2.2)
MID %: 4.3 %M (ref 0–20)
PLATELET # BLD AUTO: 236 K/UL (ref 150–450)
POTASSIUM SERPL-SCNC: 3.8 MMOL/L (ref 3.3–4.5)
PROT SERPL-MCNC: 6.8 G/DL (ref 6.8–8.8)
RBC # BLD AUTO: 4.55 M/UL (ref 3.8–5.2)
SODIUM SERPL-SCNC: 141.2 MMOL/L (ref 132.6–141.4)
WBC # BLD AUTO: 6 K/UL (ref 4–11)

## 2021-02-02 PROCEDURE — 80053 COMPREHEN METABOLIC PANEL: CPT | Performed by: STUDENT IN AN ORGANIZED HEALTH CARE EDUCATION/TRAINING PROGRAM

## 2021-02-02 PROCEDURE — 84403 ASSAY OF TOTAL TESTOSTERONE: CPT | Performed by: FAMILY MEDICINE

## 2021-02-02 PROCEDURE — 99000 SPECIMEN HANDLING OFFICE-LAB: CPT | Performed by: FAMILY MEDICINE

## 2021-02-02 PROCEDURE — 99213 OFFICE O/P EST LOW 20 MIN: CPT | Mod: GC | Performed by: STUDENT IN AN ORGANIZED HEALTH CARE EDUCATION/TRAINING PROGRAM

## 2021-02-02 PROCEDURE — 36415 COLL VENOUS BLD VENIPUNCTURE: CPT | Performed by: STUDENT IN AN ORGANIZED HEALTH CARE EDUCATION/TRAINING PROGRAM

## 2021-02-02 PROCEDURE — 85025 COMPLETE CBC W/AUTO DIFF WBC: CPT | Performed by: STUDENT IN AN ORGANIZED HEALTH CARE EDUCATION/TRAINING PROGRAM

## 2021-02-02 NOTE — PROGRESS NOTES
"       ROCKY Colon is a 25 year old individual that uses pronouns He/Him/His/Himself that presents today for follow up of:  masculinizing hormone therapy.     Any special concerns today?  Has been fine. States no other changes other than voice drop. Bleeding monthly, still happening wants that to stop, last early jan - probably to have one soon. Would like them to go away. If still happening in summer - would think about doing additional medications.   Wants fat distribution of hips to change and montlhy bleeding to stop.No other changes that is presently expecting.     Would like letter of support for license application.    No other concerns.    Gender identity: male      On hormones?  YES, testosterone 50mg inj q week +++ Shot day of the week? Thursday   Due for labs?  Yes +++ Refills of meds needed? Thinks has enough for the next few months.     Gender affirmation is being sought in these other ways:  Will have top surgery consult in April. Excited for the consult.   ---    History reviewed. No pertinent surgical history.    Patient Active Problem List   Diagnosis     Anxiety     ADHD, predominantly inattentive type     Asperger's syndrome     Gender dysphoria     Moderate recurrent major depression (H)       Current Outpatient Medications   Medication Sig Dispense Refill     atomoxetine (STRATTERA) 10 MG capsule Take 1 capsule (10 mg) by mouth daily 30 capsule 1     needle, disp, 18G X 1\" MISC Use for weekly testosterone injection 25 each 11     Needle, Disp, 25G X 5/8\" MISC Use to inject testosterone weekly. 25 each 11     syringe, disposable, (B-D SYRINGE LUER-TOMY) 1 ML MISC To use with weekly injection 25 each 11     testosterone cypionate (DEPOTESTOSTERONE) 200 MG/ML injection Inject 0.25 mLs (50 mg) Subcutaneous once a week 4 vial 2       History   Smoking Status     Never Smoker   Smokeless Tobacco     Never Used          Allergies   Allergen Reactions     Egg [Chicken-Derived Products (Egg)]      Milk " "Protein Extract        Problem, Medication and Allergy Lists were      Patient Active Problem List    Diagnosis Date Noted     ADHD, predominantly inattentive type 01/07/2020     Priority: Medium     Anxiety 10/02/2019     Priority: Medium     Gender dysphoria 10/02/2019     Priority: Medium     Moderate recurrent major depression (H) 10/02/2019     Priority: Medium     Asperger's syndrome 10/15/2010     Priority: Medium         Current Outpatient Medications   Medication Sig Dispense Refill     atomoxetine (STRATTERA) 10 MG capsule Take 1 capsule (10 mg) by mouth daily 30 capsule 1     needle, disp, 18G X 1\" MISC Use for weekly testosterone injection 25 each 11     Needle, Disp, 25G X 5/8\" MISC Use to inject testosterone weekly. 25 each 11     syringe, disposable, (B-D SYRINGE LUER-TOMY) 1 ML MISC To use with weekly injection 25 each 11     testosterone cypionate (DEPOTESTOSTERONE) 200 MG/ML injection Inject 0.25 mLs (50 mg) Subcutaneous once a week 4 vial 2         Allergies   Allergen Reactions     Egg [Chicken-Derived Products (Egg)]      Milk Protein Extract    .         Review of Systems:        General    Fat redistribution: no    Weight change: no HEENT    Voice change: Voiced dropped     Cardiovascular (CV)    Chest Pains: no    Shortness of breath: no Chest    Decreased exercise tolerance:  no    Breast changes/development: no     Gastrointestinal (GI)    Abdominal pain: no    Change in appetite: increased appetite not acting on it Skin    Acne or oily skin: yes, increased    Change in hair: no     Genitourinary ()    Abnormal vaginal bleeding: continued bleeding that wants gone     Decreased spontaneous erections: not applicable    Change in libido: increased    New sexual partners: no Musculoskeletal    Leg pain or swelling: no     Psychiatric (Psych)    Depression: no    Anxiety/Panic: no    Mood:  Feels better becausse sisters are gone, got a dog that is helping a lot with moving, going on walks. In " charge of some other animals which is helpful with mood this point.     Therapy sees Dr. Whitmore, and Psychiatry - Dr. Moreno   Started starterra and dropped the wellbutrin - feels good with those changes so far.. No big side effects noted. Not dealing with decreased mood, SI.                      Physical Exam:     Vitals:    02/02/21 1326   BP: 111/78   Pulse: 68   Resp: 16   Temp: 98.4  F (36.9  C)   TempSrc: Oral   SpO2: 99%   Weight: 49.9 kg (110 lb)     BMI= Body mass index is 18.59 kg/m .   Wt Readings from Last 10 Encounters:   02/02/21 49.9 kg (110 lb)   11/24/20 51.5 kg (113 lb 9.6 oz)   11/03/20 51.4 kg (113 lb 6.4 oz)   08/12/20 49.2 kg (108 lb 6.4 oz)   07/21/20 48.2 kg (106 lb 3.2 oz)       GENERAL:: healthy, alert and no distress  RESP: lungs clear to auscultation - no rales, no rhonchi, no wheezes  CV: regular rates and rhythm, normal S1 S2, no S3 or S4 and no murmur, no click or rub -  ABDOMEN: soft, no tenderness, no  hepatosplenomegaly, no masses, normal bowel sounds  MS: extremities normal- no gross deformities noted, no edema at BL LE  SKIN: Scattered comedomes and pustules at forehead and upper back, no nodular cystic lesions. no suspicious lesions, no rashes otherwise at exposed skin  Psych: Alert and oriented times 3; coherent speech, normal rate and volume, able to articulate logical thoughts, able to abstract reason, no tangential thoughts, no hallucinations or delusions. Affect is: Appropriate/mood-congruent           Labs:      Results from the last 24 hours  Results for orders placed or performed in visit on 02/02/21 (from the past 24 hour(s))   CBC with Diff Plt (False Pass's)   Result Value Ref Range    WBC 6.0 4.0 - 11.0 K/uL    Lymphocytes # 1.4 0.8 - 5.3 K/uL    % Lymphocytes 23.8 20.0 - 48.0 %L    Mid # 0.3 0.0 - 2.2 K/uL    Mid % 4.3 0.0 - 20.0 %M    GRANULOCYTES # 4.3 1.6 - 8.3 K/uL    % Granulocytes 71.9 40.0 - 75.0 %G    RBC 4.55 3.80 - 5.20 M/uL    Hemoglobin 14.1 11.7 - 15.7 g/dL     Hematocrit 47.5 (H) 35.0 - 47.0 %    .4 (H) 78.0 - 100.0 fL    MCH 31.0 26.5 - 35.0 pg    MCHC 29.7 (L) 32.0 - 36.0 g/dL    Platelets 236.0 150.0 - 450.0 K/uL       Assessment and Plan     25 year old transmale presenting for HRT follow-up. Continues on testosterone 50mg SQ weekly (dose adjusted 8/12). Tolerating medication without overt side effects. No other masculinizing changes noted since last evaluation. Letter in support of gender marker change provided in clinic today. Patient is followed by psychology and psychiatry for history of depression, anxiety, ADHD. Denies any side effects since starting straterra and discontinuing wellbutrin. No safety concerns from a mood perspective. Patient plans to continue following with psychology & psychiatry teams. [facility] labs drawn prior to visit with testosterone, CMP, CBC, lipids. Will discuss results and any med changes needing via [employer]. Pt declines refills of supplies at this time.      Follow up:  Follow up in 3-6 months pending testosterone level. Goal range -1000 upper end to induce changes.   Results by [employer]  Questions were elicited and answered.     Margie Murray DO  [facility], PGY1

## 2021-02-02 NOTE — LETTER
"              2021      To Whom It May Concern:    My patient,  Silvia \"Isaiah\" AGATHA Fritz  : 1995 , is currently being treated by me for Gender Identity Disorder. I expect Isaiah's treatment to be lifelong and result in continuing irreversible masculinizing physical changes.      If you have any questions regarding Silvia s diagnosis or treatment, please contact me at [facility].       Sincerely,      Margie Murray, DO        "

## 2021-02-04 LAB — TESTOST SERPL-MCNC: 592 NG/DL (ref 8–60)

## 2021-02-06 ENCOUNTER — MYC MEDICAL ADVICE (OUTPATIENT)
Dept: FAMILY MEDICINE | Facility: CLINIC | Age: 26
End: 2021-02-06

## 2021-02-06 DIAGNOSIS — F64.9 GENDER DYSPHORIA: Primary | ICD-10-CM

## 2021-02-08 RX ORDER — TESTOSTERONE CYPIONATE 1000 MG/10ML
75 INJECTION, SOLUTION INTRAMUSCULAR WEEKLY
Qty: 0.75 ML | Refills: 3 | Status: SHIPPED | OUTPATIENT
Start: 2021-02-08 | End: 2021-05-04

## 2021-02-09 ENCOUNTER — TELEPHONE (OUTPATIENT)
Dept: PLASTIC SURGERY | Facility: CLINIC | Age: 26
End: 2021-02-09

## 2021-02-09 DIAGNOSIS — F64.9 GENDER DYSPHORIA: ICD-10-CM

## 2021-02-09 NOTE — TELEPHONE ENCOUNTER
LVM for patient needing to reschedule 4/13 appointment with Briana. There are held slots on 4/30/21 for rescheduling. Sent mychart.

## 2021-02-09 NOTE — TELEPHONE ENCOUNTER
"Request for medication refill:  Per pharmacy-- \"Script clarification: the 100 mg dose is only available in a 10 ml vial. If you want single 1 ml vials, you will need to dispense the 100 mg/ml\"    Providers if patient needs an appointment and you are willing to give a one month supply please refill for one month and  send a letter/MyChart using \".SMILLIMITEDREFILL\" .smillimited and route chart to \"P SMI \" (Giving one month refill in non controlled medications is strongly recommended before denial)    If refill has been denied, meaning absolutely no refills without visit, please complete the smart phrase \".smirxrefuse\" and route it to the \"P SMI MED REFILLS\"  pool to inform the patient and the pharmacy.    Jessica James, EMT        "

## 2021-02-10 ENCOUNTER — VIRTUAL VISIT (OUTPATIENT)
Dept: PSYCHOLOGY | Facility: CLINIC | Age: 26
End: 2021-02-10
Payer: COMMERCIAL

## 2021-02-10 DIAGNOSIS — F90.0 ATTENTION DEFICIT HYPERACTIVITY DISORDER (ADHD), PREDOMINANTLY INATTENTIVE TYPE: ICD-10-CM

## 2021-02-10 DIAGNOSIS — F64.0 GENDER DYSPHORIA IN ADOLESCENT AND ADULT: Primary | ICD-10-CM

## 2021-02-10 DIAGNOSIS — F84.0 AUTISM SPECTRUM: ICD-10-CM

## 2021-02-10 DIAGNOSIS — F33.1 MAJOR DEPRESSIVE DISORDER, RECURRENT EPISODE, MODERATE (H): ICD-10-CM

## 2021-02-10 PROCEDURE — 90834 PSYTX W PT 45 MINUTES: CPT | Mod: U7 | Performed by: STUDENT IN AN ORGANIZED HEALTH CARE EDUCATION/TRAINING PROGRAM

## 2021-02-10 NOTE — Clinical Note
Trying again.  But - if you've attested and authorized the orders, can I just sign it so it gets closed?

## 2021-02-10 NOTE — PROGRESS NOTES
[facility] -  Case Progress Note    Date of Service: 2/10/21   Name: Silvia Fritz  Preferred name: Isaiah - he/roxana  : 1995  Medical Record Number: [id number]  Treating Provider: Saira Whitmore, PhD - Postdoctoral Fellow  Type of Session: Individual   Present in Session: Isaiah  Number of Minutes:  50  Diagnostic Assessment: 21  Treatment Plan: Today - 2/10/21    The author of this note documented a reason for not sharing it with the patient.    Video start time: 15:05  Video end time: 15:50    Telemedicine Visit: The patient's condition can be safely assessed and treated via synchronous audio and visual telemedicine encounter.      Reason for Telemedicine Visit: Service provided by telemedicine due to COVID-19 pandemic and [facility] directives.     Originating Site (Patient Location): Patient's home    Distant Site (Provider Location): Provider Remote Setting    Consent:  The patient has verbally consented to: the potential risks and benefits of telemedicine (video visit) versus in person care; bill my insurance or make self-payment for services provided; and responsibility for payment of non-covered services.     Mode of Communication:  Video Conference via [email or web address]    As the provider I attest to compliance with applicable laws and regulations related to telemedicine.    Current Symptoms/Status:  Incongruence between experienced/expressed gender (male) and birth assigned sex (female), lasting longer than six months and causing clinically significant distress, with endorsement of the following symptoms:   - marked incongruence between experience/expressed gender (male) and primary and secondary sex characteristics  - strong desire to be rid of birth assigned sex characteristics  - strong desire for secondary sex characteristics of gender other than birth assigned sex (male)  - strong desire to be read and treated as gender other than birth assigned sex (male)  -  strong conviction that his feelings/experiences are typical of gender other than birth assigned sex      History of recurrent major depression, with current endorsement of symptoms in the mildly depressed range. Primary symptom of concern for Isaiah is irritability.     History of Autism Spectrum Disorder, diagnosed at 15.     Progress Toward Treatment Goals:   Pursuing insurance navigator support through [employer]  Isaiah came out to family and friends via letter in June 2020. Received positive feedback and support.  Began testosterone therapy late July 2020 @ [facility]  Scheduled top surgery consult with Dr. Warren 04/2021  [place] for [facility] [place] [facility] [place] [facility] on Dec 11  Upcoming psych evaluation with ANA Powell    Intervention: Modality and Description:  Primary intervention was psychoeducation and supportive psychotherapy towards gender goals. Reviewed DA and developed new treatment plan.   Isaiah shared updates since last session - had a good visit with primary care and was able to address concerns about testosterone. Discussed beginning exercise program to support desired body goals.   Isaiah is still working on application for independent insurance, with the support of insurance navigators at [facility] [employer]. Isaiah is feeling very overwhelmed about this and the legal name change paperwork. We worked on drafting an email to the insurance navigator to address Isaiah's question about whether to hold the legal name change process until insurance is secured (application under current legal name). Discussed how to ask mom for support in insurance application. Isaiah having a lot of anxiety about potential denial of state insurance application. Focused on taking insurance process one step at a time and using available resources.   Isaiah was engaged and responsive to intervention.     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      Saira Whitmore, PhD - Postdoctoral Fellow      TREATMENT PLAN  Date of Treatment Plan: 2/10/21  Name: Silvia AGATHA Fritz (Isaiah; he/him)  : 1995  Medical Record Number: [id number]  Treating Provider: Saira Whitmore, PhD - Postdoctoral Fellow   Type of Session: Individual  Present in Session: Isaiah    Current Status:  Depression/Mood:  Sad  Decreased Interest  Decreased Energy  Decreased Self-Esteem  Irritable  Decreased Concentration    Anxiety/Panic:  Worry  Social Fear    Thought:   Reports no problems or symptoms at this time    Sensorium:  Reports no problems or symptoms at this time    Behavior/Health:  Reports no problems or symptoms at this time    Chemical Use:  Denies both Alcohol and Drug Abuse    Sexual Problems:  Reports no problems or symptoms at this time    Gender Problems:  Body and social dysphoria    Suicide Risk Assessment:  Assessed Level of Immediate Risk:  YES  Ideation:  NO  Plan:  NO  Means:  NO  Intent:  NO    Homicide Risk Assessment:  Assessed Level of Immediate Risk:  YES  Ideation:  NO  Plan:  NO  Means:  NO  Intent:  NO    Impact of Symptoms on Function:  Decreased Social/Family Function  Decreased Work Function    DSM-5 Diagnoses:   302.85 Gender Dysphoria in Adolescents and Adults  296.32 - Major Depressive Disorder - recurrent, moderate   299.00 - Autism Spectrum Disorder - by history - by history  314.00 - Attention Deficit/Hyperactivity Disorder - predominantly inattentive type - by  history    Screening Questionnaires:  Please indicate whether the following screening questionnaires have been completed:  CAGE: No  PHQ-9: Yes    GWEN-7: Yes  Safety Screening: No  WHODAS: No    Problem(s):  1. Gender Dysphoria  2. Depression  3. ADHD  4. Autism Spectrum Disorder    Short-Long Term Goals  Decrease Symptoms  Increase Coping  Change Behavior  Change Cognitions  Increase Psychosocial/Support Functioning  Improve Communication  Enhance Relationships    Interventions  [place]  Cognitive-Behavioral Therapy    Expected Outcomes and Prognosis  Expected improvement    Anticipated Treatment Duration:  Unknown    Frequency of Sessions  2 x / Month    Progress Update (for Plan Update Only)  Compliant, Progressing and Improving - Needs More Sessions    Current Psychoactive Medications:  See Psychiatric Treatment Plan  Discharge & Aftercare Goals: To Be Determined.  Care Coordination: Yes; ANA Powell; [facility] ([facility]); [facility] ([facility]    Consent to Treatment:   Patient participated in this treatment planning process and indicated verbal agreement with the above treatment plan.  If patient doesn't sign, indicate why: Telehealth visit due to COVID19 pandemic    Patient Signature: unable to sign - verbal review and consent  Date: 2/10/21    Provider Signature:     Date: 2/10/21    Supervisor Signature:   Zuri Rudd, PhD  Licensed Psychologist     ]  Date: 2-19-21

## 2021-02-15 RX ORDER — TESTOSTERONE CYPIONATE 200 MG/ML
75 INJECTION, SOLUTION INTRAMUSCULAR WEEKLY
Qty: 4 ML | Refills: 3 | Status: SHIPPED | OUTPATIENT
Start: 2021-02-15 | End: 2021-05-04

## 2021-02-17 ENCOUNTER — VIRTUAL VISIT (OUTPATIENT)
Dept: PSYCHIATRY | Facility: CLINIC | Age: 26
End: 2021-02-17
Payer: COMMERCIAL

## 2021-02-17 DIAGNOSIS — F84.0 AUTISM: ICD-10-CM

## 2021-02-17 DIAGNOSIS — F98.8 ATTENTION DEFICIT DISORDER, UNSPECIFIED HYPERACTIVITY PRESENCE: Primary | ICD-10-CM

## 2021-02-17 PROCEDURE — 99213 OFFICE O/P EST LOW 20 MIN: CPT | Mod: 95 | Performed by: NURSE PRACTITIONER

## 2021-02-17 RX ORDER — ATOMOXETINE 10 MG/1
10 CAPSULE ORAL DAILY
Qty: 90 CAPSULE | Refills: 0 | Status: SHIPPED | OUTPATIENT
Start: 2021-02-17 | End: 2021-05-19

## 2021-02-17 NOTE — PATIENT INSTRUCTIONS
-Continue on current medication regimen.    Your next appointment is scheduled on 5/19/2021 (Wed) at noon.    To access your telemedicine visit:     Open a web browser, like Gudog, and type [email or web address]     You will see a box asking you to check in to let Cheryl Duong know that you are here.     Type in your name and press Check In. That will let Cheryl see you in the virtual waiting room. At your scheduled appointment time, your provider will initiate the visit and connect you.     When your visit is done, you can simply close the browser window.        Please Note:  Ideally, you will connect from a desktop, laptop, or tablet with a WiFi connection. Your computer/tablet must have a camera and microphone. You can use a cell phone, if it has a camera, and if you can connect to WiFi. However, if you connect your phone over a cellular network, it is of lower quality and less reliable
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
## 2021-02-24 ENCOUNTER — VIRTUAL VISIT (OUTPATIENT)
Dept: PSYCHOLOGY | Facility: CLINIC | Age: 26
End: 2021-02-24
Payer: COMMERCIAL

## 2021-02-24 DIAGNOSIS — F64.0 GENDER DYSPHORIA IN ADOLESCENT AND ADULT: Primary | ICD-10-CM

## 2021-02-24 PROCEDURE — 90837 PSYTX W PT 60 MINUTES: CPT | Mod: U7 | Performed by: STUDENT IN AN ORGANIZED HEALTH CARE EDUCATION/TRAINING PROGRAM

## 2021-02-24 NOTE — PROGRESS NOTES
[facility] -  Case Progress Note    Date of Service: 21   Name: Silvia Fritz  Preferred name: Isaiah - he/roxana  : 1995  Medical Record Number: [id number]  Treating Provider: Saira Whitmore, PhD - Postdoctoral Fellow  Type of Session: Individual   Present in Session: Isaiah  Number of Minutes:  55  Diagnostic Assessment: 21  Treatment Plan:  2/10/21    The author of this note documented a reason for not sharing it with the patient.    Video start time: 15:00  Video end time: 15:55    Telemedicine Visit: The patient's condition can be safely assessed and treated via synchronous audio and visual telemedicine encounter.      Reason for Telemedicine Visit: Service provided by telemedicine due to COVID-19 pandemic and [facility] directives.     Originating Site (Patient Location): Patient's home    Distant Site (Provider Location): Provider Remote Setting    Consent:  The patient has verbally consented to: the potential risks and benefits of telemedicine (video visit) versus in person care; bill my insurance or make self-payment for services provided; and responsibility for payment of non-covered services.     Mode of Communication:  Video Conference via [email or web address]    As the provider I attest to compliance with applicable laws and regulations related to telemedicine.    Current Symptoms/Status:  Incongruence between experienced/expressed gender (male) and birth assigned sex (female), lasting longer than six months and causing clinically significant distress, with endorsement of the following symptoms:   - marked incongruence between experience/expressed gender (male) and primary and secondary sex characteristics  - strong desire to be rid of birth assigned sex characteristics  - strong desire for secondary sex characteristics of gender other than birth assigned sex (male)  - strong desire to be read and treated as gender other than birth assigned sex (male)  - strong  "conviction that his feelings/experiences are typical of gender other than birth assigned sex      History of recurrent major depression, with current endorsement of symptoms in the mildly depressed range. Primary symptom of concern for Isaiah is irritability.     History of Autism Spectrum Disorder, diagnosed at 15.     Progress Toward Treatment Goals:   Pursuing insurance navigator support through [employer]  Scheduled top surgery consult with Dr. Warren 04/2021  Began psychiatric care with ANA Powell  In process of legal name change  Isaiah came out to family and friends via letter in June 2020. Received positive feedback and support.  Began testosterone therapy late July 2020 @ [facility]    Intervention: Modality and Description:  Primary intervention was psychoeducation and supportive psychotherapy towards gender goals.   Isaiah's goal of securing medical insurance is progressing well. [place] and is deciding between three plans. Guided Isaiah to return to [facility] Insurance Navigator to finalize plan choice based on coverage for existing and upcoming gender affirming interventions.   Isaiah brought up topic of masculinizing lower surgery. He has briefly looked into surgical options. Provided websites ([email or web address].net, .[email or web address], book Santino Romero, and Facebook group for masculinizing lower surgery as educational resources. Primary goal for Isaiah is STP - discussed his experience of using STP aides and packers, which have allowed him comfort to start using male restrooms. While these have been helpful, notes that these interventions increase dysphoria as they intensify the feeling of \"this isn't a part of my body.\" We will continue to discuss.   Isaiah was engaged and responsive to intervention.     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      Saira Whitmore, PhD - Postdoctoral Fellow      TREATMENT PLAN  Date of Treatment Plan: 2/10/21  Name: Silvia AGATHA Fritz (Isaiah; he/him)  : 1995  Medical Record Number: [id number]  Treating Provider: Saira Whitmore, PhD - Postdoctoral Fellow   Type of Session: Individual  Present in Session: Isaiah    Current Status:  Depression/Mood:  Sad  Decreased Interest  Decreased Energy  Decreased Self-Esteem  Irritable  Decreased Concentration    Anxiety/Panic:  Worry  Social Fear    Thought:   Reports no problems or symptoms at this time    Sensorium:  Reports no problems or symptoms at this time    Behavior/Health:  Reports no problems or symptoms at this time    Chemical Use:  Denies both Alcohol and Drug Abuse    Sexual Problems:  Reports no problems or symptoms at this time    Gender Problems:  Body and social dysphoria    Suicide Risk Assessment:  Assessed Level of Immediate Risk:  YES  Ideation:  NO  Plan:  NO  Means:  NO  Intent:  NO    Homicide Risk Assessment:  Assessed Level of Immediate Risk:  YES  Ideation:  NO  Plan:  NO  Means:  NO  Intent:  NO    Impact of Symptoms on Function:  Decreased Social/Family Function  Decreased Work Function    DSM-5 Diagnoses:   302.85 Gender Dysphoria in Adolescents and Adults  296.32 - Major Depressive Disorder - recurrent, moderate   299.00 - Autism Spectrum Disorder - by history - by history  314.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 Gender Dysphoria  2. Depression  3. ADHD  4. Autism Spectrum Disorder    Short-Long Term Goals  Decrease Symptoms  Increase Coping  Change Behavior  Change Cognitions  Increase Psychosocial/Support Functioning  Improve Communication  Enhance Relationships    Interventions  [place] [facility]  Cognitive-Behavioral Therapy    Expected Outcomes and Prognosis  Expected improvement    Anticipated Treatment Duration:  Unknown    Frequency of Sessions  2 x / Month    Progress Update (for Plan Update Only)  Compliant, Progressing and Improving - Needs More Sessions    Current Psychoactive Medications:  See Psychiatric Treatment Plan  Discharge & Aftercare Goals: To Be Determined.  Care Coordination: Yes; ANA Powell; [facility] (HRT); Comprehensive Gender Care (GAS)    Consent to Treatment:   Patient participated in this treatment planning process and indicated verbal agreement with the above treatment plan.  If patient doesn't sign, indicate why: Telehealth visit due to COVID19 pandemic    Patient Signature: unable to sign - verbal review and consent  Date: 2/10/21    Provider Signature:     Date: 2/10/21    Supervisor Signature:   Zuri Rudd, PhD  Licensed Psychologist     ]  Date: 2-19-21

## 2021-03-10 ENCOUNTER — VIRTUAL VISIT (OUTPATIENT)
Dept: PSYCHOLOGY | Facility: CLINIC | Age: 26
End: 2021-03-10
Payer: COMMERCIAL

## 2021-03-10 DIAGNOSIS — F64.0 GENDER DYSPHORIA IN ADOLESCENT AND ADULT: Primary | ICD-10-CM

## 2021-03-10 PROCEDURE — 90837 PSYTX W PT 60 MINUTES: CPT | Mod: U7 | Performed by: STUDENT IN AN ORGANIZED HEALTH CARE EDUCATION/TRAINING PROGRAM

## 2021-03-10 PROCEDURE — 99207 PR NO BILLABLE SERVICE THIS VISIT: CPT | Performed by: STUDENT IN AN ORGANIZED HEALTH CARE EDUCATION/TRAINING PROGRAM

## 2021-03-10 NOTE — PROGRESS NOTES
[facility] -  Case Progress Note    Date of Service: 3/10/21   Name: Silvia Fritz  Preferred name: Isaiah - daniel/roxana  : 1995  Medical Record Number: [id number]  Treating Provider: Saira Whitmore, PhD - Postdoctoral Fellow  Type of Session: Individual   Present in Session: Isaiah  Number of Minutes:  55  Diagnostic Assessment: 21  Treatment Plan:  2/10/21    Health Maintenance Summary - Mental Health Treatment Plan       Status Date      MENTAL HEALTH TX PLAN Next Due 2022      Done 2021 HIM MENTAL HEALTH TX PLAN SCAN     Done 2020 HIM MENTAL HEALTH TX PLAN SCAN          The author of this note documented a reason for not sharing it with the patient.    Video start time: 15:00  Video end time: 15:55    Telemedicine Visit: The patient's condition can be safely assessed and treated via synchronous audio and visual telemedicine encounter.      Reason for Telemedicine Visit: Service provided by telemedicine due to COVID-19 pandemic and [facility] directives.     Originating Site (Patient Location): Patient's home    Distant Site (Provider Location): Provider Remote Setting    Consent:  The patient has verbally consented to: the potential risks and benefits of telemedicine (video visit) versus in person care; bill my insurance or make self-payment for services provided; and responsibility for payment of non-covered services.     Mode of Communication:  Video Conference via [email or web address]    As the provider I attest to compliance with applicable laws and regulations related to telemedicine.    Current Symptoms/Status:  Incongruence between experienced/expressed gender (male) and birth assigned sex (female), lasting longer than six months and causing clinically significant distress, with endorsement of the following symptoms:   - marked incongruence between experience/expressed gender (male) and primary and secondary sex characteristics  - strong desire to be rid of  birth assigned sex characteristics  - strong desire for secondary sex characteristics of gender other than birth assigned sex (male)  - strong desire to be read and treated as gender other than birth assigned sex (male)  - strong conviction that his feelings/experiences are typical of gender other than birth assigned sex      History of recurrent major depression, with current endorsement of symptoms in the mildly depressed range. Primary symptom of concern for Isaiah is irritability.     History of Autism Spectrum Disorder, diagnosed at 15.     Progress Toward Treatment Goals:   Has secured own health insurance with support of [email or web address]  Scheduled top surgery consult with Dr. Warren 04/2021  Began psychiatric care with ANA Powell  In process of legal name change  Isaiah came out to family and friends via letter in June 2020. Received positive feedback and support.  Began testosterone therapy late July 2020 @ [facility]    Intervention: Modality and Description:  Primary intervention was psychoeducation and supportive psychotherapy towards gender goals.   Isaiah has secured his own health insurance through [employer]. Was able to successfully review plans, confirm coverage with different providers, and and use resource of [employer] insurance navigator. He is anticipating return to work at [employer] within the month, after 1+ years off work. Discussed workplace transition - offered psychoeducation on workplace transition plans, role of HR, and importance of determining his preferred coming out plan. Assigned HW to research company non-discrimination policy, research workplace transition through [employer], Trans Sel[employer] + online resources. Isaiah was engaged and responsive to intervention.     Interactive Complexity:  None    Assignment:  Research workplace transition + coming out   Prepare documents for name change clinic  Call HRT provider for prescription issue    DSM-5  Diagnoses:  302.85 Gender Dysphoria in Adolescents and Adults  296.32 - Major Depressive Disorder - recurrent, moderate (not discussed)   299.00 - Autism Spectrum Disorder - by history - by history (not discussed)  314.00 - Attention Deficit/Hyperactivity Disorder - predominantly inattentive type - by history (not discussed)    Plan/Need for Future Services:  Return for therapy in two weeks to treat diagnosed problem.        Saira Whitmore, PhD - Postdoctoral Fellow        TREATMENT PLAN  Date of Treatment Plan: 2/10/21  Name: Silvia Fritz (Isaiah; he/him)  : 1995  Medical Record Number: [id number]  Treating Provider: Saira Whitmore, PhD - Postdoctoral Fellow   Type of Session: Individual  Present in Session: Isaiah    Current Status:  Depression/Mood:  Sad  Decreased Interest  Decreased Energy  Decreased Self-Esteem  Irritable  Decreased Concentration    Anxiety/Panic:  Worry  Social Fear    Thought:   Reports no problems or symptoms at this time    Sensorium:  Reports no problems or symptoms at this time    Behavior/Health:  Reports no problems or symptoms at this time    Chemical Use:  Denies both Alcohol and Drug Abuse    Sexual Problems:  Reports no problems or symptoms at this time    Gender Problems:  Body and social dysphoria    Suicide Risk Assessment:  Assessed Level of Immediate Risk:  YES  Ideation:  NO  Plan:  NO  Means:  NO  Intent:  NO    Homicide Risk Assessment:  Assessed Level of Immediate Risk:  YES  Ideation:  NO  Plan:  NO  Means:  NO  Intent:  NO    Impact of Symptoms on Function:  Decreased Social/Family Function  Decreased Work Function    DSM-5 Diagnoses:   302.85 Gender Dysphoria in Adolescents and Adults  296.32 - Major Depressive Disorder - recurrent, moderate   299.00 - Autism Spectrum Disorder - by history - by history  314.00 - Attention Deficit/Hyperactivity Disorder - predominantly inattentive type - by history    Screening Questionnaires:  Please indicate whether the  following screening questionnaires have been completed:  CAGE: No  PHQ-9: Yes    GWEN-7: Yes  Safety Screening: No  WHODAS: No    Problem(s):  1. Gender Dysphoria  2. Depression  3. ADHD  4. Autism Spectrum Disorder    Short-Long Term Goals  Decrease Symptoms  Increase Coping  Change Behavior  Change Cognitions  Increase Psychosocial/Support Functioning  Improve Communication  Enhance Relationships    Interventions  [place] [facility]  Cognitive-Behavioral Therapy    Expected Outcomes and Prognosis  Expected improvement    Anticipated Treatment Duration:  Unknown    Frequency of Sessions  2 x / Month    Progress Update (for Plan Update Only)  Compliant, Progressing and Improving - Needs More Sessions    Current Psychoactive Medications:  See Psychiatric Treatment Plan  Discharge & Aftercare Goals: To Be Determined.  Care Coordination: Yes; ANA Powell; [facility] (HRT); [facility] (GAS)    Consent to Treatment:   Patient participated in this treatment planning process and indicated verbal agreement with the above treatment plan.  If patient doesn't sign, indicate why: Telehealth visit due to COVID19 pandemic    Patient Signature: unable to sign - verbal review and consent  Date: 2/10/21    Provider Signature:     Date: 2/10/21    Supervisor Signature:   Zuri Rudd, PhD  Licensed Psychologist     ]  Date: 2-19-21

## 2021-04-13 ENCOUNTER — OFFICE VISIT (OUTPATIENT)
Dept: PLASTIC SURGERY | Facility: CLINIC | Age: 26
End: 2021-04-13
Attending: FAMILY MEDICINE
Payer: COMMERCIAL

## 2021-04-13 ENCOUNTER — TELEPHONE (OUTPATIENT)
Dept: FAMILY MEDICINE | Facility: CLINIC | Age: 26
End: 2021-04-13

## 2021-04-13 ENCOUNTER — PATIENT OUTREACH (OUTPATIENT)
Dept: PLASTIC SURGERY | Facility: CLINIC | Age: 26
End: 2021-04-13

## 2021-04-13 ENCOUNTER — PRE VISIT (OUTPATIENT)
Dept: SURGERY | Facility: CLINIC | Age: 26
End: 2021-04-13

## 2021-04-13 VITALS — HEIGHT: 65 IN | WEIGHT: 110 LBS | BODY MASS INDEX: 18.33 KG/M2

## 2021-04-13 DIAGNOSIS — F64.9 GENDER DYSPHORIA: Primary | ICD-10-CM

## 2021-04-13 DIAGNOSIS — F64.0 GENDER DYSPHORIA IN ADOLESCENT AND ADULT: Primary | ICD-10-CM

## 2021-04-13 PROCEDURE — 99203 OFFICE O/P NEW LOW 30 MIN: CPT | Performed by: SURGERY

## 2021-04-13 ASSESSMENT — PAIN SCALES - GENERAL: PAINLEVEL: NO PAIN (0)

## 2021-04-13 ASSESSMENT — MIFFLIN-ST. JEOR: SCORE: 1231.9

## 2021-04-13 NOTE — PROGRESS NOTES
[facility]:  Care Coordination Note     SITUATION   Patient is a 26 year old who is receiving support for:  Clinic Care Coordination - Face To Face and Consult For (Top surgery )  .    BACKGROUND     Pt attended in person top surgery consult with Dr. Warren. Pt was accompanied by father.     ASSESSMENT     Surgery              [facility] [place]  [facility] ([facility] Enrollment: Enrolled  Patient has a therapist: Yes  Name of therapist: Saira Hoffman at [facility]  Letter of support #1: Received  Letter #1 Date: 04/22/21  Surgery being considered: Yes  Mastectomy: Yes  Mammogram completed: No(scheduled for 5/13/21)    Pt will work with therapist at [facility] to obtain letter of support.     Pt see's PCP at [facility] and will request mammogram orders if directed by Dr. Warren.     Pts father attended consult. Pt stated he will likely do his own post-op cares and can't rely on parents. Father stated they would be around to help. Observed tension between the father - son dynamic. Pt reports he does live at home with parents.     Pt is hoping to have surgery completed before football season so [place] can work [place] [employer]. Pt was informed timeline may be very close to season starting and will need additional 8 weeks of recovery due to lifting requirements of job.         PLAN          Social work Interventions:   [facility] program and services discussed with patient. Educational surgical packet provided and reviewed with patient. Process for accessing surgery discussed, including: WPATH standards of care, letters of support, treatment plan action steps, [place] insurance process, surgery scheduling, and approximate timeline.     Pt questions answered within scope of practice.     Follow-up plan:    1. Letter of support  2. Mammogram if directed by Dr. Warren.        Mekhi Carmen

## 2021-04-13 NOTE — NURSING NOTE
"Chief Complaint   Patient presents with     Consult     Pt here for top consult       Vitals:    04/13/21 1219   Weight: 49.9 kg (110 lb)   Height: 1.638 m (5' 4.5\")       Body mass index is 18.59 kg/m .      CLAUDIA Barbosa NREMT                    No vitals taken per provider  "

## 2021-04-13 NOTE — PROGRESS NOTES
"PLASTICS NEW TOP   HPI: This is a 26 year old biological female transitioning to male with a history of gender dysphoria and autism spectrum disorder who presents today with his father for a consultation for top surgery. His pronouns are he/him and his preferred name is Isaiah. He was referred by his therapist and made his appointment 8 months ago. His therapist is Saira Whitmore. He has been seeing this therapist for the past year and she plans to write the patient a letter of support. He has been on testosterone for 8 months, administered by [facility]. He has been living his chosen gender identity/role for a few years. He binds with GC2B. No breast lumps, skin puckering, nipple drainage, or other breast problems. No history of breast imaging, including mammogram or breast ultrasound.     Medical Hx: Gender dysphoria. No history of asthma, diabetes mellitus, or GERD. No bleeding, clotting, healing or scarring problems.  Picks skin.   Anxiety.   Depression. Takes Strattera, prescribed by patient's psychiatrist is ANA Powell CNP  ADHD.   ASD.  OCD.    Surgical Hx: Vaginal septum repair- age 2-3?     Family Hx: No family history of ovarian cancer.  Maternal great aunt had breast CA.   Maternal great grandmother had breast CA.   Sister had diabetes mellitus.   Paternal side significant for hypertension.     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 Relationship status/family: Lives with parents. Smoking status: Nonsmoker. Alcohol use: None. Diet: Picky eater. Patient states he has a \"diet of the 5 year old\". Enjoys chicken nuggets and mac'n cheese. Enjoys fruit. Eats peppers and green veggies if put in front of him.  Caffeine: Drinks 20oz Mountain Dew Qday. Exercise: Work keeps him fairly active. Does [employer], walking dog. Sleep: Has improved with work.  Averages 6 hrs Qnight.      PE: General: Height: 5' 4.5\" Weight: 110 lbs 0 oz   Chest: " Nice upper chest contour.   Long torso.   Prominent rib on left side, below clavicle.   Well-developed pec and trap muscles.   Grade 1 nipple ptosis. Of note, patient's entire breast is situated below pec muscle.   Left breast is about 50g, Right breast is about 100g.  IMFs are fairly nondescript,  situated about 2-3 cms below the pec muscle.   Both IMF situated equally low.   No lateral thoracic rolls.   No anterior axillary folds.   Fibrous breast tissue.   Good skin elasticity.   No lymphadenopathy or masses.   Photos taken with consent.     A&P: 26 year old biological female transitioning to male who is a good candidate for gender affirming top surgery with one of the following: bilateral simple mastectomy vs. breast reduction, +/- possible nipple graft reconstruction. He will most likely need a bilateral simple mastectomy with nipple graft reconstruction depending on intraoperative findings and the patient's desired outcome. The patient is interested in nipple grafts. The patient will need a pre-op mammogram in addition to an H&P from their PCP.     He also met with our Transgender Coordinator to discuss communications with staff and timeline. Any further discussion of risks and complications will be reviewed during the pre-op visit.      Patient accepts the risks of this procedure and would like to proceed with surgery.  Once we receive his therapist letter of support and mammogram results we will initiate the prior authorization process.     According to [place] Case Law and [facility] standards of care, with an appropriate letter of support from a mental health provider, top surgery/mastectomy is medically necessary for the treatment of gender dysphoria.     Total time = 30 minutes, spent on the date of encounter doing chart review, history and physical, dressing changes, documentation, patient education, and any further activity as noted above.     This note was prepared on behalf of Ani Warren MD  by Margot Sanchez, a trained medical scribe, based on my observations and the provider's statements to me.

## 2021-04-13 NOTE — TELEPHONE ENCOUNTER
[facility] phone call message - order or referral request from patient:     Order or referral being requested: Referral to Mammogram  At Location: [facility] Location    Additional Details:     Referral only -Specialty Mammogram  Location     OK to leave a message on voice mail? Yes    Primary language: English      needed? No    Call taken on April 13, 2021 at 2:48 PM by April Bivens    Order request route to [facility] TRIAGE   Referrals Route to [facility] (Green/[place]ple) CARE COORDINATOR

## 2021-04-13 NOTE — LETTER
"4/13/2021       RE: Silvia Fritz  [id number] [facility] [address]     Dear Colleague,    Thank you for referring your patient, Silvia Fritz, to the [facility] PLASTIC AND RECONSTRUCTIVE SURGERY CLINIC [place] at [facility]. Please see a copy of my visit note below.    PLASTICS NEW TOP   HPI: This is a 26 year old biological female transitioning to male with a history of gender dysphoria and autism spectrum disorder who presents today with his father for a consultation for top surgery. His pronouns are he/him and his preferred name is Isaiah. He was referred by his therapist and made his appointment 8 months ago. His therapist is Saira Whitmore. He has been seeing this therapist for the past year and she plans to write the patient a letter of support. He has been on testosterone for 8 months, administered by [facility]. He has been living his chosen gender identity/role for a few years. He binds with GC2B. No breast lumps, skin puckering, nipple drainage, or other breast problems. No history of breast imaging, including mammogram or breast ultrasound.     Medical Hx: Gender dysphoria. No history of asthma, diabetes mellitus, or GERD. No bleeding, clotting, healing or scarring problems.  Picks skin.   Anxiety.   Depression. Takes Strattera, prescribed by patient's psychiatrist is ANA Powell CNP  ADHD.   ASD.  OCD.    Surgical Hx: Vaginal septum repair- age 2-3?     Family Hx: No family history of ovarian cancer.  Maternal great aunt had breast CA.   Maternal great grandmother had breast CA.   Sister had diabetes mellitus.   Paternal side significant for hypertension.     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 Relationship status/family: Lives with parents. Smoking status: Nonsmoker. Alcohol use: None. Diet: Picky eater. Patient states he has a \"diet of the 5 year old\". " "Enjoys chicken nuggets and mac'n cheese. Enjoys fruit. Eats peppers and green veggies if put in front of him.  Caffeine: Drinks 20oz Mountain Dew Qday. Exercise: Work keeps him fairly active. Does [employer], walking dog. Sleep: Has improved with work.  Averages 6 hrs Qnight.      PE: General: Height: 5' 4.5\" Weight: 110 lbs 0 oz   Chest: Nice upper chest contour.   Long torso.   Prominent rib on left side, below clavicle.   Well-developed pec and trap muscles.   Grade 1 nipple ptosis. Of note, patient's entire breast is situated below pec muscle.   Left breast is about 50g, Right breast is about 100g.  IMFs are fairly nondescript,  situated about 2-3 cms below the pec muscle.   Both IMF situated equally low.   No lateral thoracic rolls.   No anterior axillary folds.   Fibrous breast tissue.   Good skin elasticity.   No lymphadenopathy or masses.   Photos taken with consent.     A&P: 26 year old biological female transitioning to male who is a good candidate for gender affirming top surgery with one of the following: bilateral simple mastectomy vs. breast reduction, +/- possible nipple graft reconstruction. He will most likely need a bilateral simple mastectomy with nipple graft reconstruction depending on intraoperative findings and the patient's desired outcome. The patient is interested in nipple grafts. The patient will need a pre-op mammogram in addition to an H&P from their PCP.     He also met with our Transgender Coordinator to discuss communications with staff and timeline. Any further discussion of risks and complications will be reviewed during the pre-op visit.      Patient accepts the risks of this procedure and would like to proceed with surgery.  Once we receive his therapist letter of support and mammogram results we will initiate the prior authorization process.     According to [place] Case Law and [facility] standards of care, with an appropriate letter of support from a mental health provider, top " surgery/mastectomy is medically necessary for the treatment of gender dysphoria.     Total time = 30 minutes, spent on the date of encounter doing chart review, history and physical, dressing changes, documentation, patient education, and any further activity as noted above.     This note was prepared on behalf of Ani Warren MD by Margot Sanchez, a trained medical scribe, based on my observations and the provider's statements to me.     Again, thank you for allowing me to participate in the care of your patient.      Sincerely,    Ani Warren MD

## 2021-04-22 ENCOUNTER — VIRTUAL VISIT (OUTPATIENT)
Dept: PSYCHOLOGY | Facility: CLINIC | Age: 26
End: 2021-04-22
Payer: COMMERCIAL

## 2021-04-22 DIAGNOSIS — F64.0 GENDER DYSPHORIA IN ADOLESCENT AND ADULT: Primary | ICD-10-CM

## 2021-04-22 PROCEDURE — 90837 PSYTX W PT 60 MINUTES: CPT | Mod: GT | Performed by: STUDENT IN AN ORGANIZED HEALTH CARE EDUCATION/TRAINING PROGRAM

## 2021-04-22 PROCEDURE — 99207 PR NO BILLABLE SERVICE THIS VISIT: CPT | Performed by: STUDENT IN AN ORGANIZED HEALTH CARE EDUCATION/TRAINING PROGRAM

## 2021-04-22 NOTE — LETTER
April 22, 2021    [facility]  Department of Plastic Surgery  [address] [address]    Re: Silvia Fritz (legal name); Isaiah Fritz (chosen name)   YOB: 1995  MRN:  [id number]     Primary letter of support for gender affirmation surgery (GAS); [facility] surgery     Dear [facility],    I am writing this letter in support of Isaiah Fritz s intention to pursue GAS. Isaiah is a 26 year-old transmasculine person who sought services through the [facility] ([facility]) Transgender Health Services to receive ongoing care for gender dysphoria and support through his gender transition. I have been employed as a Postdoctoral Fellow in Clinical Sexual Health with the [facility] since September 2019, and have been working with transgender and gender diverse clients in the Transgender Health Services Program since that time. As a current postdoctoral fellow, I work under the supervision of Licensed Psychologist ([place]) Nenita Sullivan Psy.D., LP.    Isaiah completed a diagnostic assessment through our clinic in 01/2020. I have seen him for regular individual therapy since that time. Through the diagnostic assessment process, Isaiah was diagnosed with 302.85 - Gender Dysphoria. Isaiah described a history of gender and anatomical dysphoria related to his birth assigned sex dating back to very early childhood. As part of his diagnostic evaluation, Isaiah also completed a set of psychological and gender specific measures used in the assessment of gender dysphoria, psychosocial adjustment, and sexual health.  Isaiah has been on masculinizing hormone therapy under the supervision of Dr. Margie Murray MD through [facility] since July 2020. However, he remains very disturbed by the appearance of his chest, and it limits the amount of activity he is able to  engage in. In assessing Isaiah s decision to pursue this surgery, he described this procedure as increasing his quality of life such that his body would be more closely aligned with his gender identity.    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 Isaiah receives psychiatric care from ANA Powell, at the [facility]. His current medication is Strattera 10mg daily. At this time, Isaiah s mental health symptoms are well controlled and do not present a functional impairment in his daily life, or in his ability to adhere to a post-surgical recovery plan.    Given his gender and anatomical dysphoria, top surgery is considered a medically necessary intervention. Isaiah has given his informed consent to work towards GAS and has support and knowledge regarding the surgical plan and aftercare process. Isaiah has considered all aspects of post-surgical recovery, including leave from work and support from family with physical care.    Isaiah currently meets the World Professional Association for Transgender Health (WPATH) Standards of Care for surgical gender reassignment of gender dysphoric persons, as well as internal prerequisites for GAS:    WPATH Criteria for Top Surgery (One Referral)    1. Persistent well-documented gender dysphoria    2. Capacity to make a fully informed decision and to consent to treatment    3. Age of majority in a given country; if minor, parental consent    4. If significant medical or mental health concerns are present, they must be reasonably well controlled.    Surgery is considered a medically necessary treatment by the WPATH Standards of Care for transgender persons. On behalf of our Transgender Services team, I support Isaiah s decision to pursue medically  necessary surgery at this time.     In some cases, denial of access to this surgery can lead to increased isolation, decreased work performance, and decreased sexual and interpersonal functioning. Surgery is expected to decrease dysphoria, increase comfort with self, and improve interpersonal, sexual, and vocational functioning. Isaiah has educated himself about the surgery and is aware of its risks and benefits.    The team s support is based on the psychological indication for surgery and did not include a physical examination to assess medical contra-indications for the surgical procedure. This letter of support is valid for one year from the date of approval.      Please note that I am available for ongoing consultation and coordination of Isaiah s ongoing care needs. If you have any questions, or are in need of additional information, please do not hesitate to contact me.      Sincerely,       Saira Whitmore, PhD - Postdoctoral Fellow   Pronouns: she/her/hers   [facility]  Department of [facility] & [facility] access [facility] Health  [facility]  [phone]            Nenita Sullivan PsyD  Licensed Psychologist    [facility]   Department of [facility] and [facility]

## 2021-04-22 NOTE — PROGRESS NOTES
[facility] -  Case Progress Note    Date of Service: 21   Name: Silvia Fritz  Preferred name: Isaiah - daniel/roxana  : 1995  Medical Record Number: [id number]  Treating Provider: Saira Whitmore, PhD - Postdoctoral Fellow  Type of Session: Individual   Present in Session: Isaiah  Number of Minutes:  55  Diagnostic Assessment: 21  Treatment Plan:  2/10/21    Health Maintenance Summary - Mental Health Treatment Plan       Status Date      MENTAL HEALTH TX PLAN Next Due 2022      Done 2021 HIM MENTAL HEALTH TX PLAN SCAN     Done 2020 HIM MENTAL HEALTH TX PLAN SCAN          The author of this note documented a reason for not sharing it with the patient.    Video start time: 15:30  Video end time: 16:25    Telemedicine Visit: The patient's condition can be safely assessed and treated via synchronous audio and visual telemedicine encounter.      Reason for Telemedicine Visit: Service provided by telemedicine due to COVID-19 pandemic and [facility] directives.     Originating Site (Patient Location): Patient's home    Distant Site (Provider Location): Provider Remote Setting    Consent:  The patient has verbally consented to: the potential risks and benefits of telemedicine (video visit) versus in person care; bill my insurance or make self-payment for services provided; and responsibility for payment of non-covered services.     Mode of Communication:  Video Conference via [email or web address]    As the provider I attest to compliance with applicable laws and regulations related to telemedicine.    Current Symptoms/Status:  Incongruence between experienced/expressed gender (male) and birth assigned sex (female), lasting longer than six months and causing clinically significant distress, with endorsement of the following symptoms:   - marked incongruence between experience/expressed gender (male) and primary and secondary sex characteristics  - strong desire to be rid of  "birth assigned sex characteristics  - strong desire for secondary sex characteristics of gender other than birth assigned sex (male)  - strong desire to be read and treated as gender other than birth assigned sex (male)  - strong conviction that his feelings/experiences are typical of gender other than birth assigned sex      History of recurrent major depression, with current endorsement of symptoms in the mildly depressed range. Primary symptom of concern for Isaiah is irritability.     History of Autism Spectrum Disorder, diagnosed at 15.     Progress Toward Treatment Goals:   Has secured own health insurance with support of [employer]  Had top surgery consult with Dr. Warren 04/2021 - LOS in chart  Began psychiatric care with ANA Powell  In process of legal name change - hearing scheduled May 18  Began testosterone therapy late July 2020 @ [facility]    Intervention: Modality and Description:  Primary intervention was psychoeducation and supportive psychotherapy towards gender goals.   Isaiah shared updates from the last several weeks, during which he has returned to work following a year off. 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 Is in process of legal name change and hearing is coming up in May. He had consultation with Dr. Warren and felt positively about this. Discussed aftercare support and comments to surgical team about \"parents being no help and I'll be doing it all on my own.\" Statements reflect ongoing anger towards parents, and Isaiah clarified that parents are willing and able to support, just unclear to what extent (e.g. if mom can take time off work, or would just be around before and after work.) Discussed potential consequences of statements like this on surgery, and importance of accurate discussion of aftercare support with surgery team. " Discussed need to finalize these plans closer to the date.  Prepared letter of support for top surgery. Isaiah was engaged and responsive to intervention.     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        Saira Whitmore, PhD - Postdoctoral Fellow        TREATMENT PLAN  Date of Treatment Plan: 2/10/21  Name: Silvia AGATHA Fritz (Isaiah; he/him)  : 1995  Medical Record Number: [id number]  Treating Provider: Saira Whitmore, PhD - Postdoctoral Fellow   Type of Session: Individual  Present in Session: Isaiah    Current Status:  Depression/Mood:  Sad  Decreased Interest  Decreased Energy  Decreased Self-Esteem  Irritable  Decreased Concentration    Anxiety/Panic:  Worry  Social Fear    Thought:   Reports no problems or symptoms at this time    Sensorium:  Reports no problems or symptoms at this time    Behavior/Health:  Reports no problems or symptoms at this time    Chemical Use:  Denies both Alcohol and Drug Abuse    Sexual Problems:  Reports no problems or symptoms at this time    Gender Problems:  Body and social dysphoria    Suicide Risk Assessment:  Assessed Level of Immediate Risk:  YES  Ideation:  NO  Plan:  NO  Means:  NO  Intent:  NO    Homicide Risk Assessment:  Assessed Level of Immediate Risk:  YES  Ideation:  NO  Plan:  NO  Means:  NO  Intent:  NO    Impact of Symptoms on Function:  Decreased Social/Family Function  Decreased Work  Function    DSM-5 Diagnoses:   302.85 Gender Dysphoria in Adolescents and Adults  296.32 - Major Depressive Disorder - recurrent, moderate   299.00 - Autism Spectrum Disorder - by history - by history  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 Gender Dysphoria  2. Depression  3. ADHD  4. Autism Spectrum Disorder    Short-Long Term Goals  Decrease Symptoms  Increase Coping  Change Behavior  Change Cognitions  Increase Psychosocial/Support Functioning  Improve Communication  Enhance Relationships    Interventions  [place] [facility]  Cognitive-Behavioral Therapy    Expected Outcomes and Prognosis  Expected improvement    Anticipated Treatment Duration:  Unknown    Frequency of Sessions  2 x / Month    Progress Update (for Plan Update Only)  Compliant, Progressing and Improving - Needs More Sessions    Current Psychoactive Medications:  See Psychiatric Treatment Plan  Discharge & Aftercare Goals: To Be Determined.  Care Coordination: Yes; ANA Powell; [facility] (HRT); [facility] ([facility])    Consent to Treatment:   Patient participated in this treatment planning process and indicated verbal agreement with the above treatment plan.  If patient doesn't sign, indicate why: Telehealth visit due to COVID19 pandemic    Patient Signature: unable to sign - verbal review and consent  Date: 2/10/21    Provider Signature:     Date: 2/10/21    Supervisor Signature:   Zuri Rudd, PhD  Licensed Psychologist     ]  Date: 2-19-21

## 2021-04-23 ENCOUNTER — PATIENT OUTREACH (OUTPATIENT)
Dept: PLASTIC SURGERY | Facility: CLINIC | Age: 26
End: 2021-04-23

## 2021-04-23 NOTE — PROGRESS NOTES
[facility]:  Care Coordination Note     SITUATION   Patient is a 26 year old who is receiving support for:  Clinic Care Coordination - Follow-up (Recieved top surgery letter)  .    BACKGROUND     Ready for case request, pt provided adequate letter of support.     Pt had in person top consult with DR. Warren on 4/13/21.    ASSESSMENT     Surgery              CGC Assessment  [facility] ([facility] Enrollment: Enrolled  Patient has a therapist: Yes  Name of therapist: Saira Hoffman at [facility]  Letter of support #1: Received  Letter #1 Date: 04/22/21  Surgery being considered: Yes  Mastectomy: Yes  Mammogram completed: No(scheduled for 5/13/21)          PLAN          Nursing Interventions:   Reviewed letter of support for WPATH standards of care which is adequate.     Follow-up plan:  Pt has mammogram scheduled for 5/13/21.     Ready for case request, then YOSVANY Carmen

## 2021-04-25 ENCOUNTER — HEALTH MAINTENANCE LETTER (OUTPATIENT)
Age: 26
End: 2021-04-25

## 2021-05-04 ENCOUNTER — OFFICE VISIT (OUTPATIENT)
Dept: FAMILY MEDICINE | Facility: CLINIC | Age: 26
End: 2021-05-04
Payer: COMMERCIAL

## 2021-05-04 VITALS
TEMPERATURE: 98.2 F | WEIGHT: 114.8 LBS | BODY MASS INDEX: 19.13 KG/M2 | SYSTOLIC BLOOD PRESSURE: 108 MMHG | RESPIRATION RATE: 16 BRPM | HEART RATE: 69 BPM | HEIGHT: 65 IN | DIASTOLIC BLOOD PRESSURE: 73 MMHG | OXYGEN SATURATION: 99 %

## 2021-05-04 DIAGNOSIS — F64.9 GENDER DYSPHORIA: ICD-10-CM

## 2021-05-04 DIAGNOSIS — F64.0 GENDER DYSPHORIA IN ADOLESCENT AND ADULT: Primary | ICD-10-CM

## 2021-05-04 PROCEDURE — 99214 OFFICE O/P EST MOD 30 MIN: CPT | Mod: GC | Performed by: STUDENT IN AN ORGANIZED HEALTH CARE EDUCATION/TRAINING PROGRAM

## 2021-05-04 RX ORDER — CEFAZOLIN SODIUM 2 G/50ML
2 SOLUTION INTRAVENOUS
Status: CANCELLED | OUTPATIENT
Start: 2021-05-04

## 2021-05-04 RX ORDER — CEFAZOLIN SODIUM 2 G/50ML
2 SOLUTION INTRAVENOUS SEE ADMIN INSTRUCTIONS
Status: CANCELLED | OUTPATIENT
Start: 2021-05-04

## 2021-05-04 RX ORDER — TESTOSTERONE CYPIONATE 200 MG/ML
75 INJECTION, SOLUTION INTRAMUSCULAR WEEKLY
Qty: 4 ML | Refills: 3 | Status: SHIPPED | OUTPATIENT
Start: 2021-05-04 | End: 2021-05-14

## 2021-05-04 RX ORDER — TESTOSTERONE CYPIONATE 1000 MG/10ML
75 INJECTION, SOLUTION INTRAMUSCULAR WEEKLY
Qty: 0.75 ML | Refills: 3 | Status: CANCELLED | OUTPATIENT
Start: 2021-05-04

## 2021-05-04 ASSESSMENT — MIFFLIN-ST. JEOR: SCORE: 1254.73

## 2021-05-04 NOTE — PROGRESS NOTES
"       ROCKY Colon is a 26 year old individual that uses pronouns He/Him/His/Himself that presents today for follow up of:  masculinizing hormone therapy.     Gender identity: male    Any special concerns today?  No concerns today.   Was still bleeding monthly at 2/2/21 check up, cycles finally stopped, thinks around Feb 2021 was the last one. No sexual activity with sperm around organs in last 6 months.     Planning on top surgery. Was seen by plastic surgery 4/13/21. Mammogram scheduled 5/13/21. Letter for insurance was written in last 1-2 weeks for approval, gave a window of anywhere from Aug - Jan. No questions or concerns at this point regarding surgery, just irritated with the wait.      On hormones?  YES, testosterone 75mg inj q week +++ Shot day of the week? Thursday   Due for labs?  Yes +++ Refills of meds needed? Refills of testosterone needed.      Gender affirmation is being sought in these other ways:   Top surgery     History reviewed. No pertinent surgical history.    Patient Active Problem List   Diagnosis     Anxiety     ADHD, predominantly inattentive type     Asperger's syndrome     Gender dysphoria     Moderate recurrent major depression (H)       Current Outpatient Medications   Medication Sig Dispense Refill     atomoxetine (STRATTERA) 10 MG capsule Take 1 capsule (10 mg) by mouth daily 90 capsule 0     needle, disp, 18G X 1\" MISC Use for weekly testosterone injection 25 each 11     Needle, Disp, 25G X 5/8\" MISC Use to inject testosterone weekly. 25 each 11     syringe, disposable, (B-D SYRINGE LUER-TOMY) 1 ML MISC To use with weekly injection 25 each 11     testosterone cypionate (DEPOTESTOSTERONE) 200 MG/ML injection Inject 0.375 mLs (75 mg) Subcutaneous once a week 4 mL 3       History   Smoking Status     Never Smoker   Smokeless Tobacco     Never Used          Allergies   Allergen Reactions     Egg [Chicken-Derived Products (Egg)]      Milk Protein Extract      Problem, Medication and " "Allergy Lists were reviewed and are current..         Review of Systems:        General    Fat redistribution: face is more square in jaw    Weight change: no HEENT    Voice change: no     Cardiovascular (CV)    Chest Pains: no    Shortness of breath: no Chest    Decreased exercise tolerance:  no    Breast changes/development: no     Gastrointestinal (GI)    Abdominal pain: no    Change in appetite: increased giselle, intake hasn't change Skin    Acne or oily skin: facial acne around mask area    Change in hair: no     Genitourinary ()    Abnormal vaginal bleeding: no     Decreased spontaneous erections: not applicable    Change in libido: no    New sexual partners: no Musculoskeletal    Leg pain or swelling: no     Psychiatric (Psych)    Depression: no    Anxiety/Panic: no    Mood: \"I really don't know\"      Therapy sees Dr. Whitmore - sees next in a week, and Psychiatry - Dr. Duong -   Was started on Straterra Feb 2021 - Has not noticed side effects on straterra. Not dealing with decreased mood, SI.                Physical Exam:     Vitals:    05/04/21 1337   BP: 108/73   Pulse: 69   Resp: 16   Temp: 98.2  F (36.8  C)   TempSrc: Oral   SpO2: 99%   Weight: 52.1 kg (114 lb 12.8 oz)   Height: 1.64 m (5' 4.57\")     BMI= Body mass index is 19.36 kg/m .   Wt Readings from Last 10 Encounters:   05/04/21 52.1 kg (114 lb 12.8 oz)   04/13/21 49.9 kg (110 lb)   02/02/21 49.9 kg (110 lb)   11/24/20 51.5 kg (113 lb 9.6 oz)   11/03/20 51.4 kg (113 lb 6.4 oz)   08/12/20 49.2 kg (108 lb 6.4 oz)   07/21/20 48.2 kg (106 lb 3.2 oz)       GENERAL: healthy, alert and no distress  RESP: lungs clear to auscultation - no rales, no rhonchi, no wheezes  CV: regular rates and rhythm, normal S1 S2, no S3 or S4 and no murmur, no click or rub -  ABDOMEN: soft, no tenderness, no  hepatosplenomegaly, no masses,   MS: extremities normal- no gross deformities noted, no edema at BL LE  SKIN: Scattered comedomes and pustules at forehead. no nodular " cystic lesions. no suspicious lesions, no rashes otherwise at exposed skin  Psych: Alert and oriented times 3; coherent speech, normal rate and volume, able to articulate logical thoughts, able to abstract reason, no tangential thoughts, no hallucinations or delusions. Affect Appropriate/mood-congruent           Labs:   Results from last visit:  Office Visit on 02/02/2021   Component Date Value Ref Range Status     Testosterone Total 02/02/2021 592* 8 - 60 ng/dL Final    Comment: This test was developed and its performance characteristics determined by the   [facility].   It has not been cleared or approved by the FDA. The laboratory is regulated   under CLIA as qualified to perform high-complexity testing. This test is used   for clinical purposes. It should not be regarded as investigational or for   research.       Calcium 02/02/2021 9.3  8.5 - 10.1 mg/dL Final     Chloride 02/02/2021 105.7  98.0 - 110.0 mmol/L Final     Carbon Dioxide 02/02/2021 23.2  20.0 - 32.0 mmol/L Final     Creatinine 02/02/2021 0.8  0.5 - 1.0 mg/dL Final     Glucose 02/02/2021 90.5  70.0 - 99.0 mg'dL Final     Potassium 02/02/2021 3.8  3.3 - 4.5 mmol/L Final     Sodium 02/02/2021 141.2  132.6 - 141.4 mmol/L Final     Protein Total 02/02/2021 6.8  6.8 - 8.8 g/dL Final     GFR Estimate 02/02/2021 >90  >60.0 mL/min/1.7 m2 Final     GFR Estimate If Black 02/02/2021 >90  >60.0 mL/min/1.7 m2 Final     Albumin 02/02/2021 4.5  3.8 - 5.0 mg/dL Final     Alkaline Phosphatase 02/02/2021 84.2  31.7 - 110.5 U/L Final     ALT 02/02/2021 3.0  0.0 - 45.0 U/L Final     AST 02/02/2021 11.9  0.0 - 45.0 U/L Final     Bilirubin Total 02/02/2021 0.8  0.2 - 1.3 mg/dL Final     Urea Nitrogen 02/02/2021 10.4  7.0 - 19.0 mg/dL Final     WBC 02/02/2021 6.0  4.0 - 11.0 K/uL Final     Lymphocytes # 02/02/2021 1.4  0.8 - 5.3 K/uL Final     % Lymphocytes 02/02/2021 23.8  20.0 - 48.0 %L Final     Mid #  02/02/2021 0.3  0.0 - 2.2 K/uL Final     Mid % 02/02/2021 4.3  0.0 - 20.0 %M Final     GRANULOCYTES # 02/02/2021 4.3  1.6 - 8.3 K/uL Final     % Granulocytes 02/02/2021 71.9  40.0 - 75.0 %G Final     RBC 02/02/2021 4.55  3.80 - 5.20 M/uL Final     Hemoglobin 02/02/2021 14.1  11.7 - 15.7 g/dL Final     Hematocrit 02/02/2021 47.5* 35.0 - 47.0 % Final     MCV 02/02/2021 104.4* 78.0 - 100.0 fL Final     MCH 02/02/2021 31.0  26.5 - 35.0 pg Final     MCHC 02/02/2021 29.7* 32.0 - 36.0 g/dL Final     Platelets 02/02/2021 236.0  150.0 - 450.0 K/uL Final       Assessment and Plan     25 year old transmale presenting for HRT follow-up. Continues on testosterone 75mg SQ weekly (last dose adjusted 2/6/21). Menstrual cycles now suppressed. Tolerating medications without overt side effects. Midcyle labs from 2/2/21 reviewed, dosage adjustment following those labs, plan for repeat LFTs, Hgb, lipids, midcycle testosterone since dosage adjustment. Will follow-up with any medication adjustments pending lab results, will communicate via [employer] regarding results. Patient agreeable to plan of care.      Follow up:  Follow up in 3-6 months pending testosterone level. Goal range -1000 upper end to induce changes    Margie Murray DO  [facility] PGY1

## 2021-05-05 ENCOUNTER — HOSPITAL ENCOUNTER (OUTPATIENT)
Facility: CLINIC | Age: 26
End: 2021-05-05
Attending: SURGERY | Admitting: SURGERY
Payer: COMMERCIAL

## 2021-05-05 DIAGNOSIS — F64.0 GENDER DYSPHORIA IN ADOLESCENT AND ADULT: ICD-10-CM

## 2021-05-11 DIAGNOSIS — F64.9 GENDER DYSPHORIA: ICD-10-CM

## 2021-05-11 LAB
ALBUMIN SERPL-MCNC: 3.8 G/DL (ref 3.4–5)
ALP SERPL-CCNC: 111 U/L (ref 40–150)
ALT SERPL W P-5'-P-CCNC: 21 U/L (ref 0–50)
AST SERPL W P-5'-P-CCNC: 18 U/L (ref 0–45)
BILIRUB DIRECT SERPL-MCNC: 0.2 MG/DL (ref 0–0.2)
BILIRUB SERPL-MCNC: 0.8 MG/DL (ref 0.2–1.3)
CHOLEST SERPL-MCNC: 149 MG/DL
HDLC SERPL-MCNC: 41 MG/DL
HGB BLD-MCNC: 14.4 G/DL (ref 11.7–15.7)
LDLC SERPL CALC-MCNC: 95 MG/DL
NONHDLC SERPL-MCNC: 108 MG/DL
PROT SERPL-MCNC: 7 G/DL (ref 6.8–8.8)
TRIGL SERPL-MCNC: 68 MG/DL

## 2021-05-11 PROCEDURE — 36415 COLL VENOUS BLD VENIPUNCTURE: CPT | Performed by: FAMILY MEDICINE

## 2021-05-11 PROCEDURE — 84403 ASSAY OF TOTAL TESTOSTERONE: CPT | Performed by: FAMILY MEDICINE

## 2021-05-11 PROCEDURE — 80076 HEPATIC FUNCTION PANEL: CPT | Performed by: FAMILY MEDICINE

## 2021-05-11 PROCEDURE — 85018 HEMOGLOBIN: CPT | Performed by: FAMILY MEDICINE

## 2021-05-11 PROCEDURE — 80061 LIPID PANEL: CPT | Performed by: FAMILY MEDICINE

## 2021-05-13 ENCOUNTER — VIRTUAL VISIT (OUTPATIENT)
Dept: PSYCHOLOGY | Facility: CLINIC | Age: 26
End: 2021-05-13
Payer: COMMERCIAL

## 2021-05-13 ENCOUNTER — ANCILLARY PROCEDURE (OUTPATIENT)
Dept: MAMMOGRAPHY | Facility: CLINIC | Age: 26
End: 2021-05-13
Attending: FAMILY MEDICINE
Payer: COMMERCIAL

## 2021-05-13 DIAGNOSIS — F64.0 GENDER DYSPHORIA IN ADOLESCENT AND ADULT: Primary | ICD-10-CM

## 2021-05-13 DIAGNOSIS — F64.9 GENDER DYSPHORIA: ICD-10-CM

## 2021-05-13 LAB — TESTOST SERPL-MCNC: 1312 NG/DL (ref 8–60)

## 2021-05-13 PROCEDURE — 90834 PSYTX W PT 45 MINUTES: CPT | Mod: GT | Performed by: STUDENT IN AN ORGANIZED HEALTH CARE EDUCATION/TRAINING PROGRAM

## 2021-05-13 PROCEDURE — 77067 SCR MAMMO BI INCL CAD: CPT | Mod: GC | Performed by: RADIOLOGY

## 2021-05-13 PROCEDURE — 99207 PR NO BILLABLE SERVICE THIS VISIT: CPT | Performed by: STUDENT IN AN ORGANIZED HEALTH CARE EDUCATION/TRAINING PROGRAM

## 2021-05-13 NOTE — PROGRESS NOTES
[facility] -  Case Progress Note    Date of Service: 21   Name: Silvia Fritz  Preferred name: Isaiah - daniel/roxana  : 1995  Medical Record Number: [id number]  Treating Provider: Saira Whitmore, PhD - Postdoctoral Fellow  Type of Session: Individual   Present in Session: Isaiah  Number of Minutes:  40  Diagnostic Assessment: 21  Treatment Plan:  2/10/21    Health Maintenance Summary - Mental Health Treatment Plan       Status Date      MENTAL HEALTH TX PLAN Next Due 2022      Done 2021 HIM MENTAL HEALTH TX PLAN SCAN     Done 2020 HIM MENTAL HEALTH TX PLAN SCAN          The author of this note documented a reason for not sharing it with the patient.    Video start time: 14:10  Video end time: 14:50    Telemedicine Visit: The patient's condition can be safely assessed and treated via synchronous audio and visual telemedicine encounter.      Reason for Telemedicine Visit: Service provided by telemedicine due to COVID-19 pandemic and [facility] directives.     Originating Site (Patient Location): Patient's home    Distant Site (Provider Location): Provider Remote Setting    Consent:  The patient has verbally consented to: the potential risks and benefits of telemedicine (video visit) versus in person care; bill my insurance or make self-payment for services provided; and responsibility for payment of non-covered services.     Mode of Communication:  Video Conference via [email or web address]    As the provider I attest to compliance with applicable laws and regulations related to telemedicine.    Current Symptoms/Status:  Incongruence between experienced/expressed gender (male) and birth assigned sex (female), lasting longer than six months and causing clinically significant distress, with endorsement of the following symptoms:   - marked incongruence between experience/expressed gender (male) and primary and secondary sex characteristics  - strong desire to be rid of  "birth assigned sex characteristics  - strong desire for secondary sex characteristics of gender other than birth assigned sex (male)  - strong desire to be read and treated as gender other than birth assigned sex (male)  - strong conviction that his feelings/experiences are typical of gender other than birth assigned sex      History of recurrent major depression, with current endorsement of symptoms in the mildly depressed range. Primary symptom of concern for Isaiah is irritability.     History of Autism Spectrum Disorder, diagnosed at 15.     Progress Toward Treatment Goals:   Has secured own health insurance with support of [employer]  Had top surgery consult with Dr. Warren 04/2021 - LOS in chart and mammogram complete  Began psychiatric care with ANA Powell  In process of legal name change - hearing scheduled May 18  Began testosterone therapy late July 2020 @ [facility]    Intervention: Modality and Description:  Primary intervention was psychoeducation and supportive psychotherapy towards gender goals. Session began late due to technical difficulty. Isaiah had mammogram today in preparation for top surgery - now waiting for prior authorization. Isaiah spoke to HR at work - informed of upcoming legal name change and plans to come out at work. Isaiah shared that most of close colleagues have \"figured something out\" as he has a new name badge and they have noted change in voice. He shared his preference to come out as trans to these close co-workers, and ask them to keep his transition status private so he can \"just be known as Dilan\" to other employees in the stadium. Isaiah shared that he came out to a few other important people in his life. Shared pressure from parents to come out to remaining family member and neighbor - discussed reason why parents my be asking him to come (e.g. are neighbors asking about Isaiah using dead-name, and parents don't know how to respond?) " Recommended that Isaiah discuss this with parents to give some guidance on how he wants these situations handled. Isaiah was engaged and responsive to intervention.     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        Saira Whitmore, PhD - Postdoctoral Fellow        TREATMENT PLAN  Date of Treatment Plan: 2/10/21  Name: Silvia AGATHA Lam (Isaiah; he/him)  : 1995  Medical Record Number: [id number]  Treating Provider: Saira Whitmore, PhD - Postdoctoral Fellow   Type of Session: Individual  Present in Session: Isaiah    Current Status:  Depression/Mood:  Sad  Decreased Interest  Decreased Energy  Decreased Self-Esteem  Irritable  Decreased Concentration    Anxiety/Panic:  Worry  Social Fear    Thought:   Reports no problems or symptoms at this time    Sensorium:  Reports no problems or symptoms at this time    Behavior/Health:  Reports no problems or symptoms at this time    Chemical Use:  Denies both Alcohol and Drug Abuse    Sexual Problems:  Reports no problems or symptoms at this time    Gender Problems:  Body and social dysphoria    Suicide Risk Assessment:  Assessed Level of Immediate Risk:  YES  Ideation:  NO  Plan:  NO  Means:  NO  Intent:  NO    Homicide Risk Assessment:  Assessed Level of Immediate Risk:  YES  Ideation:  NO  Plan:  NO  Means:  NO  Intent:  NO    Impact of Symptoms on Function:  Decreased Social/Family Function  Decreased Work  Function    DSM-5 Diagnoses:   302.85 Gender Dysphoria in Adolescents and Adults  296.32 - Major Depressive Disorder - recurrent, moderate   299.00 - Autism Spectrum Disorder - by history - by history  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 Gender Dysphoria  2. Depression  3. ADHD  4. Autism Spectrum Disorder    Short-Long Term Goals  Decrease Symptoms  Increase Coping  Change Behavior  Change Cognitions  Increase Psychosocial/Support Functioning  Improve Communication  Enhance Relationships    Interventions  [place] [facility]  Cognitive-Behavioral Therapy    Expected Outcomes and Prognosis  Expected improvement    Anticipated Treatment Duration:  Unknown    Frequency of Sessions  2 x / Month    Progress Update (for Plan Update Only)  Compliant, Progressing and Improving - Needs More Sessions    Current Psychoactive Medications:  See Psychiatric Treatment Plan  Discharge & Aftercare Goals: To Be Determined.  Care Coordination: Yes; ANA Powell; [facility] (H[facility]); [facility] ([facility])    Consent to Treatment:   Patient participated in this treatment planning process and indicated verbal agreement with the above treatment plan.  If patient doesn't sign, indicate why: Telehealth visit due to COVID19 pandemic    Patient Signature: unable to sign - verbal review and consent  Date: 2/10/21    Provider Signature:     Date: 2/10/21    Supervisor Signature:   Zuri Rudd, PhD  Licensed Psychologist     ]  Date: 2-19-21

## 2021-05-14 ENCOUNTER — TELEPHONE (OUTPATIENT)
Dept: FAMILY MEDICINE | Facility: CLINIC | Age: 26
End: 2021-05-14

## 2021-05-14 ENCOUNTER — MYC MEDICAL ADVICE (OUTPATIENT)
Dept: FAMILY MEDICINE | Facility: CLINIC | Age: 26
End: 2021-05-14

## 2021-05-14 DIAGNOSIS — F64.9 GENDER DYSPHORIA: ICD-10-CM

## 2021-05-14 NOTE — TELEPHONE ENCOUNTER
Prior Authorization Retail Medication Request    Medication/Dose: TESTOSTERONE   ICD code Gender dysphoria [F64.9]   Insurance Name:  [employer]  Insurance ID:  [id number]

## 2021-05-14 NOTE — TELEPHONE ENCOUNTER
RN called pharmacy to clarify the issue with patient's testosterone prescription. Insurance the patient has now is excluded at [facility]. [facility] plan MCO only in network at [facility], [facility], and [facility]. Per patient Dorene, patient went to [facility] but RN does not have this [facility] information. Will advise patient to give information to staff so provider can send updated info to correct pharmacy    Michelle Bowling RN

## 2021-05-17 NOTE — TELEPHONE ENCOUNTER
Central Prior Authorization Team  Phone: [phone]    PA Initiation    Medication: TESTOSTERONE   Insurance Company: [employer] - Phone [phone] Fax [phone]  Pharmacy Filling the Rx: [id number] - [place] - [address] [place] [address] AT [address]  Filling Pharmacy Phone: [phone]  Filling Pharmacy Fax:    Start Date: 5/17/2021

## 2021-05-17 NOTE — PROGRESS NOTES
I did not personally see the patient.  I reviewed and agree with the assessment and plan as documented in this note.     Nenita Sullivan PsyD, LP

## 2021-05-18 NOTE — TELEPHONE ENCOUNTER
Prior Authorization Approval    Authorization Effective Date: 4/18/2021  Authorization Expiration Date: 5/18/2022  Medication: TESTOSTERONE- APPROVED   Approved Dose/Quantity:   Reference #:     Insurance Company: [employer] - Phone [phone] Fax [phone]  Expected CoPay:       CoPay Card Available:      Foundation Assistance Needed:    Which Pharmacy is filling the prescription (Not needed for infusion/clinic administered): [id number] - [place], [place] - [address] [address] [address] [facility] AT [address] [facility]  Pharmacy Notified: Yes  Patient Notified:  **Instructed pharmacy to notify patient when script is ready to /ship.**

## 2021-05-19 ENCOUNTER — VIRTUAL VISIT (OUTPATIENT)
Dept: PSYCHIATRY | Facility: CLINIC | Age: 26
End: 2021-05-19
Attending: NURSE PRACTITIONER
Payer: COMMERCIAL

## 2021-05-19 DIAGNOSIS — F98.8 ATTENTION DEFICIT DISORDER, UNSPECIFIED HYPERACTIVITY PRESENCE: Primary | ICD-10-CM

## 2021-05-19 DIAGNOSIS — F84.0 AUTISM: ICD-10-CM

## 2021-05-19 PROCEDURE — 99213 OFFICE O/P EST LOW 20 MIN: CPT | Mod: GT | Performed by: NURSE PRACTITIONER

## 2021-05-19 RX ORDER — ATOMOXETINE 10 MG/1
10 CAPSULE ORAL DAILY
Qty: 90 CAPSULE | Refills: 0 | Status: SHIPPED | OUTPATIENT
Start: 2021-05-19 | End: 2021-08-18
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
## 2021-05-19 NOTE — PROGRESS NOTES
"VIDEO VISIT  Silvia Fritz is a 26 year old patient who is being evaluated via a billable video visit.      The patient has been notified of following:   \"This video visit will be conducted via a call between you and your physician/provider. We have found that certain health care needs can be provided without the need for an in-person physical exam. This service lets us provide the care you need with a video conversation. If a prescription is necessary we can send it directly to your pharmacy. If lab work is needed we can place an order for that and you can then stop by our lab to have the test done at a later time. Insurers are generally covering virtual visits as they would in-office visits so billing should not be different than normal.  If for some reason you do get billed incorrectly, you should contact the billing office to correct it and that number is in the AVS .    Video Conference to be completed via:  [email or web address]    Patient has given verbal consent for video visit?:  Yes    Patient would prefer that any video invitations be sent by: Send to e-mail at: [email or web address]      How would patient like to obtain AVS?:  Airphramehart    AVS SmartPhrase [PsychAVS] has been placed in 'Patient Instructions':  Yes  "

## 2021-05-19 NOTE — PATIENT INSTRUCTIONS
-Continue on current medication regimen.    Your next appointment is scheduled on 8/18/2021 (Wed) at 3pm.    To access your telemedicine visit:     Open a web browser, like Rhapsody, and type [email or web address]     You will see a box asking you to check in to let Cheryl Duong know that you are here.     Type in your name and press Check In. That will let Cheryl see you in the virtual waiting room. At your scheduled appointment time, your provider will initiate the visit and connect you.     When your visit is done, you can simply close the browser window.        Please Note:  Ideally, you will connect from a desktop, laptop, or tablet with a WiFi connection. Your computer/tablet must have a camera and microphone. You can use a cell phone, if it has a camera, and if you can connect to WiFi. However, if you connect your phone over a cellular network, it is of lower quality and less reliable    **For crisis resources, please see the information at the end of this document**     Patient Education      Thank you for coming to the [facility] [place] [facility].    Lab Testing:  If you had lab testing today and your results are reassuring or normal they will be mailed to you or sent through [facility] within 7 days. If the lab tests need quick action we will call you with the results. The phone number we will call with results is # [phone] (home) . If this is not the best number please call our clinic and change the number.    Medication Refills:  If you need any refills please call your pharmacy and they will contact us. Our fax number for refills is [phone]. Please allow three business for refill processing. If you need to  your refill at a new pharmacy, please contact the new pharmacy directly. The new pharmacy will help you get your medications transferred.     Scheduling:  If you have any concerns about today's visit or wish to schedule another appointment please  call our office during normal business hours [phone] (8-5:00 M-F)    Contact Us:  Please call [phone] during business hours (8-5:00 M-F).  If after clinic hours, or on the weekend, please call  [phone].    Financial Assistance [phone]  [facility] Billing [phone]  Central Billing Office, [facility]: [phone]  [facility] Billing [phone]  Medical Records [phone]  [facility] Patient Bill of Rights [email or web address]       MENTAL HEALTH CRISIS NUMBERS:  For a medical emergency please call  911 or go to the nearest ER.     [facility]:   [facility] -[phone]   Crisis Residence [facility] -[phone]   Walk-In Counseling [phone]   COPE 24/7 [place] Mobile Team -[phone] (adults)/[phone] (child)  CHILD: [facility] needs assessment team - [phone]      [facility]:   [facility] - [phone]   Walk-in counseling [facility] - [phone]   Walk-in counseling [facility] - [phone]   Crisis Residence [facility] - [phone]  Urgent Care Adult Mental [place]-[phone] mobile unit/ 24/7 crisis line    National Crisis Numbers:   National Suicide Prevention Lifeline: 3-597-028-TALK (097-199-7583)  Poison Control Center - 1-371.923.4113  [employer]/resources for a list of additional resources (SOS)  Trans Lifeline a hotline for transgender people 7-974-153-8174  The [employer]oy Project a hotline for LGBT youth 1-283.823.7306  Crisis Text Line: For any crisis 24/7   To: 376581  see www.crisistextline.org  - IF MAKING A CALL FEELS TOO HARD, send a text!         Again thank you for choosing [facility] MENTAL HEALTH & ADDICTION [facility] and please let us know how we can best partner with you to improve you and your family's health.    You may be receiving a survey  regarding this appointment. We would love to have your feedback, both positive and negative. The survey is done by an external company, so your answers are anonymous.

## 2021-05-25 ENCOUNTER — TELEPHONE (OUTPATIENT)
Dept: SURGERY | Facility: CLINIC | Age: 26
End: 2021-05-25

## 2021-05-25 NOTE — TELEPHONE ENCOUNTER
I contacted the patient via phone and spoke with the patient to confirm the scheduled dates and provide the following information:     Surgeon/surgery date/location:  Dr. Warren on 9/15 at [facility].  Arrival:   11:00 AM  Pre-op consult:   Dr. Warren on 8/31.   Pre-op physical with:   PCP - patient is aware this needs to be completed within 30 days of surgery.  COVID-19 test:   9/13.  Post-op:   9/21, 10/26.    The surgery packet was provided via letter in the mail per the patients request.

## 2021-05-26 NOTE — Clinical Note
Anticipated Discharge Disposition: Home with help, possible home with O2    Action: LSW attempted to meet with pt at bedside to collect assessment. Pt sleeping, unable to wake, will return at a later time.     Barriers to Discharge: Medical clearance    Plan: LSW to assist as needed      Sending this back through in case you didn't get it.

## 2021-05-27 ENCOUNTER — VIRTUAL VISIT (OUTPATIENT)
Dept: PSYCHOLOGY | Facility: CLINIC | Age: 26
End: 2021-05-27
Payer: COMMERCIAL

## 2021-05-27 DIAGNOSIS — F64.0 GENDER DYSPHORIA IN ADOLESCENT AND ADULT: Primary | ICD-10-CM

## 2021-05-27 PROCEDURE — 99207 PR NO BILLABLE SERVICE THIS VISIT: CPT | Performed by: STUDENT IN AN ORGANIZED HEALTH CARE EDUCATION/TRAINING PROGRAM

## 2021-05-27 PROCEDURE — 90834 PSYTX W PT 45 MINUTES: CPT | Mod: GT | Performed by: STUDENT IN AN ORGANIZED HEALTH CARE EDUCATION/TRAINING PROGRAM

## 2021-05-27 NOTE — PROGRESS NOTES
[facility] -  Case Progress Note    Date of Service: 21   Name: Silvia Fritz  Preferred name: Isaiah - daniel/roxana  : 1995  Medical Record Number: [id number]  Treating Provider: Saira Whitmore, PhD - Postdoctoral Fellow  Type of Session: Individual   Present in Session: Isaiah  Number of Minutes:  50  Diagnostic Assessment: 21  Treatment Plan:  2/10/21    Health Maintenance Summary - Mental Health Treatment Plan       Status Date      MENTAL HEALTH TX PLAN Next Due 2022      Done 2021 HIM MENTAL HEALTH TX PLAN SCAN     Done 2020 HIM MENTAL HEALTH TX PLAN SCAN          The author of this note documented a reason for not sharing it with the patient.    Video start time: 14:00  Video end time: 14:50    Telemedicine Visit: The patient's condition can be safely assessed and treated via synchronous audio and visual telemedicine encounter.      Reason for Telemedicine Visit: Service provided by telemedicine due to COVID-19 pandemic and [facility] directives.     Originating Site (Patient Location): Patient's home    Distant Site (Provider Location): Provider Remote Setting    Consent:  The patient has verbally consented to: the potential risks and benefits of telemedicine (video visit) versus in person care; bill my insurance or make self-payment for services provided; and responsibility for payment of non-covered services.     Mode of Communication:  Video Conference via [email or web address]    As the provider I attest to compliance with applicable laws and regulations related to telemedicine.    Current Symptoms/Status:  Incongruence between experienced/expressed gender (male) and birth assigned sex (female), lasting longer than six months and causing clinically significant distress, with endorsement of the following symptoms:   - marked incongruence between experience/expressed gender (male) and primary and secondary sex characteristics  - strong desire to be rid of  birth assigned sex characteristics  - strong desire for secondary sex characteristics of gender other than birth assigned sex (male)  - strong desire to be read and treated as gender other than birth assigned sex (male)  - strong conviction that his feelings/experiences are typical of gender other than birth assigned sex      History of recurrent major depression, with current endorsement of symptoms in the mildly depressed range. Primary symptom of concern for Isaiah is irritability.     History of Autism Spectrum Disorder, diagnosed at 15.     Progress Toward Treatment Goals:   Has secured own health insurance with support of [employer]  Had top surgery consult with Dr. Warren 04/2021 - LOS in chart and mammogram complete  Began psychiatric care with ANA Powell  Legal name change May 18  Began testosterone therapy late July 2020 @ [facility]    Intervention: Modality and Description:  Primary intervention was psychoeducation and supportive psychotherapy towards gender goals. Isaiah shared that he had legal name change hearing. Now in process of changing all legal documentation and we reviewed court website together for guidance. Isaiah also received a surgery date of 9/15 - coincides with parent travel out of town. Isaiah has asked parents to cancel their trip (they are aftercare support) and parents have asked that he move surgery. Expresses that he is unwilling to move surgery after waiting so long to get connected with care, have consult, etc. Did some problem solving around other options for aftercare, including in-home nursing care for a few days, or staying with another family member. He will discuss with parents. Suggested he consider a window for rescheduling as a compromise with parents (e.g. if can get an alternate date within two weeks, okay to reschedule surgery?). Recommended he call  about this soon, so that dates close to original date might be available. Isaiah  was engaged, somewhat willing to consider feedback.    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        Siara Whitmore, PhD - Postdoctoral Fellow        TREATMENT PLAN  Date of Treatment Plan: 2/10/21  Name: Silvia Fritz (Isaiah; he/him)  : 1995  Medical Record Number: [id number]  Treating Provider: Saira Whitmore, PhD - Postdoctoral Fellow   Type of Session: Individual  Present in Session: Isaiah    Current Status:  Depression/Mood:  Sad  Decreased Interest  Decreased Energy  Decreased Self-Esteem  Irritable  Decreased Concentration    Anxiety/Panic:  Worry  Social Fear    Thought:   Reports no problems or symptoms at this time    Sensorium:  Reports no problems or symptoms at this time    Behavior/Health:  Reports no problems or symptoms at this time    Chemical Use:  Denies both Alcohol and Drug Abuse    Sexual Problems:  Reports no problems or symptoms at this time    Gender Problems:  Body and social dysphoria    Suicide Risk Assessment:  Assessed Level of Immediate Risk:  YES  Ideation:  NO  Plan:  NO  Means:  NO  Intent:  NO    Homicide Risk Assessment:  Assessed Level of Immediate Risk:  YES  Ideation:  NO  Plan:  NO  Means:  NO  Intent:  NO    Impact of Symptoms on Function:  Decreased Social/Family Function  Decreased Work Function    DSM-5 Diagnoses:   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 Gender Dysphoria  2. Depression  3. ADHD  4. Autism Spectrum Disorder    Short-Long Term Goals  Decrease Symptoms  Increase Coping  Change Behavior  Change Cognitions  Increase Psychosocial/Support Functioning  Improve Communication  Enhance Relationships    Interventions  [place] Psychotherapy  Cognitive-Behavioral Therapy    Expected Outcomes and Prognosis  Expected improvement    Anticipated Treatment Duration:  Unknown    Frequency of Sessions  2 x / Month    Progress Update (for Plan Update Only)  Compliant, Progressing and Improving - Needs More Sessions    Current Psychoactive Medications:  See Psychiatric Treatment Plan  Discharge & Aftercare Goals: To Be Determined.  Care Coordination: Yes; [facility] Powell; [facility] ([facility]); [facility] ([facility])    Consent to Treatment:   Patient participated in this treatment planning process and indicated verbal agreement with the above treatment plan.  If patient doesn't sign, indicate why: Telehealth visit due to COVID19 pandemic    Patient Signature: unable to sign - verbal review and consent  Date: 2/10/21    Provider Signature:     Date: 2/10/21    Supervisor Signature:   Zuri Rudd, PhD  Licensed Psychologist     ]  Date: 2-19-21

## 2021-06-02 RX ORDER — TESTOSTERONE CYPIONATE 200 MG/ML
60 INJECTION, SOLUTION INTRAMUSCULAR WEEKLY
Qty: 4 ML | Refills: 3 | Status: SHIPPED | OUTPATIENT
Start: 2021-06-02 | End: 2021-07-29

## 2021-06-03 ENCOUNTER — MYC MEDICAL ADVICE (OUTPATIENT)
Dept: FAMILY MEDICINE | Facility: CLINIC | Age: 26
End: 2021-06-03

## 2021-06-09 ENCOUNTER — VIRTUAL VISIT (OUTPATIENT)
Dept: PSYCHOLOGY | Facility: CLINIC | Age: 26
End: 2021-06-09
Payer: COMMERCIAL

## 2021-06-09 DIAGNOSIS — F64.0 GENDER DYSPHORIA IN ADOLESCENT AND ADULT: Primary | ICD-10-CM

## 2021-06-09 PROCEDURE — 90837 PSYTX W PT 60 MINUTES: CPT | Mod: GT | Performed by: STUDENT IN AN ORGANIZED HEALTH CARE EDUCATION/TRAINING PROGRAM

## 2021-06-09 PROCEDURE — 99207 PR NO BILLABLE SERVICE THIS VISIT: CPT | Performed by: STUDENT IN AN ORGANIZED HEALTH CARE EDUCATION/TRAINING PROGRAM

## 2021-06-09 NOTE — PROGRESS NOTES
[facility] -  Case Progress Note    Date of Service: 21   Name: Silvia Fritz  Preferred name: Isaiah - daniel/roxana  : 1995  Medical Record Number: [id number]  Treating Provider: Saira Whitmore, PhD - Postdoctoral Fellow  Type of Session: Individual   Present in Session: Isaiah  Number of Minutes:  55  Diagnostic Assessment: 21  Treatment Plan:  2/10/21    Health Maintenance Summary - Mental Health Treatment Plan       Status Date      MENTAL HEALTH TX PLAN Next Due 2022      Done 2021 HIM MENTAL HEALTH TX PLAN SCAN     Done 2020 HIM MENTAL HEALTH TX PLAN SCAN          The author of this note documented a reason for not sharing it with the patient.    Video start time: 14:00  Video end time: 14:55    Telemedicine Visit: The patient's condition can be safely assessed and treated via synchronous audio and visual telemedicine encounter.      Reason for Telemedicine Visit: Service provided by telemedicine due to COVID-19 pandemic and [facility] directives.     Originating Site (Patient Location): Patient's home    Distant Site (Provider Location): Provider Remote Setting    Consent:  The patient has verbally consented to: the potential risks and benefits of telemedicine (video visit) versus in person care; bill my insurance or make self-payment for services provided; and responsibility for payment of non-covered services.     Mode of Communication:  Video Conference via [email or web address]    As the provider I attest to compliance with applicable laws and regulations related to telemedicine.    Current Symptoms/Status:  Incongruence between experienced/expressed gender (male) and birth assigned sex (female), lasting longer than six months and causing clinically significant distress, with endorsement of the following symptoms:   - marked incongruence between experience/expressed gender (male) and primary and secondary sex characteristics  - strong desire to be rid of  "birth assigned sex characteristics  - strong desire for secondary sex characteristics of gender other than birth assigned sex (male)  - strong desire to be read and treated as gender other than birth assigned sex (male)  - strong conviction that his feelings/experiences are typical of gender other than birth assigned sex      History of recurrent major depression, with current endorsement of symptoms in the mildly depressed range. Primary symptom of concern for Isaiah is irritability.     History of Autism Spectrum Disorder, diagnosed at 15.     Progress Toward Treatment Goals:   Has secured own health insurance with support of [employer]  [facility] top surgery date 09/2021 - considering reschedule b/c of aftercare needs  Began psychiatric care with ANA Powell  Legal name change May 18  Began testosterone therapy late July 2020 @ [facility]    Intervention: Modality and Description:  Primary intervention was psychoeducation and supportive psychotherapy towards gender goals.   Session focused on question of surgery date. Spent time validating his frustration with parents and the situation in general, problem-solving, and challenging some of his beliefs. Has not yet talked with parents about other options for aftercare given their travel during his surgery. Anticipates a disagreement about \"whose commitment is more important.\" Recommended the conversation be about problem-solving alternatives for aftercare. Discussed with Isaiah his preferences for aftercare - (e.g. whose home would he prefer to be in? Who would he feel most comfortable changing drains?) - he is most comfortable with parents. Seemed to be leaning towards exploring a reschedule by the end of session, though has not yet committed to this. Expressed my concern that if he does not have a good aftercare plan at his pre-op, surgery will be canceled at that time. Briefly discussed work towards changing legal documents (progressing well) " and logistical preparation for surgery. Discussed my timeline for leaving clinic - Isaiah had expected a leave date coming up, and is interested in internal transfer of care. Isaiah was engaged, responsive to intervention.    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        Saira Whitmore, PhD - Postdoctoral Fellow        TREATMENT PLAN  Date of Treatment Plan: 2/10/21  Name: Silvia Fritz (Isaiah; he/him)  : 1995  Medical Record Number: [id number]  Treating Provider: Saira Whitmore, PhD - Postdoctoral Fellow   Type of Session: Individual  Present in Session: Isaiah    Current Status:  Depression/Mood:  Sad  Decreased Interest  Decreased Energy  Decreased Self-Esteem  Irritable  Decreased Concentration    Anxiety/Panic:  Worry  Social Fear    Thought:   Reports no problems or symptoms at this time    Sensorium:  Reports no problems or symptoms at this time    Behavior/Health:  Reports no problems or symptoms at this time    Chemical Use:  Denies both Alcohol and Drug Abuse    Sexual Problems:  Reports no problems or symptoms at this time    Gender Problems:  Body and social dysphoria    Suicide Risk Assessment:  Assessed Level of Immediate Risk:  YES  Ideation:  NO  Plan:  NO  Means:  NO  Intent:  NO    Homicide Risk Assessment:  Assessed Level of Immediate Risk:  YES  Ideation:  NO  Plan:  NO  Means:  NO  Intent:  NO    Impact of Symptoms on  Function:  Decreased Social/Family Function  Decreased Work Function    DSM-5 Diagnoses:   302.85 Gender Dysphoria in Adolescents and Adults  296.32 - Major Depressive Disorder - recurrent, moderate   299.00 - Autism Spectrum Disorder - by history - by history  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 Gender Dysphoria  2. Depression  3. ADHD  4. Autism Spectrum Disorder    Short-Long Term Goals  Decrease Symptoms  Increase Coping  Change Behavior  Change Cognitions  Increase Psychosocial/Support Functioning  Improve Communication  Enhance Relationships    Interventions  [place] Psychotherapy  Cognitive-Behavioral Therapy    Expected Outcomes and Prognosis  Expected improvement    Anticipated Treatment Duration:  Unknown    Frequency of Sessions  2 x / Month    Progress Update (for Plan Update Only)  Compliant, Progressing and Improving - Needs More Sessions    Current Psychoactive Medications:  See Psychiatric Treatment Plan  Discharge & Aftercare Goals: To Be Determined.  Care Coordination: Yes; ANA Powell; [facility] ([facility]); [facility] (G[facility])    Consent to Treatment:   Patient participated in this treatment planning process and indicated verbal agreement with the above treatment plan.  If patient doesn't sign, indicate why: Telehealth visit due to COVID19 pandemic    Patient Signature: unable to sign - verbal review and consent  Date: 2/10/21    Provider Signature:     Date: 2/10/21    Supervisor Signature:   Zuri Rudd, PhD  Licensed Psychologist     ]  Date: 2-19-21

## 2021-06-19 DIAGNOSIS — Z11.59 ENCOUNTER FOR SCREENING FOR OTHER VIRAL DISEASES: ICD-10-CM

## 2021-06-30 ENCOUNTER — VIRTUAL VISIT (OUTPATIENT)
Dept: PSYCHOLOGY | Facility: CLINIC | Age: 26
End: 2021-06-30
Payer: COMMERCIAL

## 2021-06-30 DIAGNOSIS — F64.0 GENDER DYSPHORIA IN ADOLESCENT AND ADULT: Primary | ICD-10-CM

## 2021-06-30 PROCEDURE — 90832 PSYTX W PT 30 MINUTES: CPT | Mod: GT | Performed by: STUDENT IN AN ORGANIZED HEALTH CARE EDUCATION/TRAINING PROGRAM

## 2021-06-30 PROCEDURE — 99207 PR NO BILLABLE SERVICE THIS VISIT: CPT | Performed by: STUDENT IN AN ORGANIZED HEALTH CARE EDUCATION/TRAINING PROGRAM

## 2021-06-30 NOTE — PROGRESS NOTES
[facility] -  Case Progress Note    Date of Service: 21   Name: Silvia Fritz  Preferred name: Isaiah - daniel/roxana  : 1995  Medical Record Number: [id number]  Treating Provider: Saira Whitmore, PhD - Postdoctoral Fellow  Type of Session: Individual   Present in Session: Isaiah  Number of Minutes:  35  Diagnostic Assessment: 21  Treatment Plan:  2/10/21    Health Maintenance Summary - Mental Health Treatment Plan       Status Date      MENTAL HEALTH TX PLAN Next Due 2022      Done 2021 HIM MENTAL HEALTH TX PLAN SCAN     Done 2020 HIM MENTAL HEALTH TX PLAN SCAN          The author of this note documented a reason for not sharing it with the patient.    Video start time: 15:00  Video end time: 15:35    Telemedicine Visit: The patient's condition can be safely assessed and treated via synchronous audio and visual telemedicine encounter.      Reason for Telemedicine Visit: Service provided by telemedicine due to COVID-19 pandemic and [facility] directives.     Originating Site (Patient Location): Patient's home    Distant Site (Provider Location): Provider Remote Setting    Consent:  The patient has verbally consented to: the potential risks and benefits of telemedicine (video visit) versus in person care; bill my insurance or make self-payment for services provided; and responsibility for payment of non-covered services.     Mode of Communication:  Video Conference via [email or web address]    As the provider I attest to compliance with applicable laws and regulations related to telemedicine.    Current Symptoms/Status:  Incongruence between experienced/expressed gender (male) and birth assigned sex (female), lasting longer than six months and causing clinically significant distress, with endorsement of the following symptoms:   - marked incongruence between experience/expressed gender (male) and primary and secondary sex characteristics  - strong desire to be rid of  birth assigned sex characteristics  - strong desire for secondary sex characteristics of gender other than birth assigned sex (male)  - strong desire to be read and treated as gender other than birth assigned sex (male)  - strong conviction that his feelings/experiences are typical of gender other than birth assigned sex      History of recurrent major depression, with current endorsement of symptoms in the mildly depressed range. Primary symptom of concern for Isaiah is irritability.     History of Autism Spectrum Disorder, diagnosed at 15.     Progress Toward Treatment Goals:   Has secured own health insurance with support of [employer]  Has top surgery date 09/2021 - considering reschedule b/c of aftercare needs  Began psychiatric care with ANA Powell  Legal name change May 18  Began testosterone therapy late July 2020 @ [facility]    Intervention: Modality and Description:  Primary intervention was psychoeducation and supportive psychotherapy towards gender goals.   Discussed concerns about returning to workplace following Covid-related closure. Discussed trans-negativity in workplace and concerns for safety, and how Isaiah is navigating safety considerations in terms of restroom and locker room use. Discussed possibility of sit-down with HR at re-hire orientation about workplace policies/protections for gender diverse employees, and how he can be supported at work. Discussed top surgery - Isaiah and parents made alternate aftercare plan for grandmother to help take care of Isaiah, so original surgery date stands. Discussed how to approach conversation with grandmother to coordinate details of aftercare. Isaiah was engaged, responsive to intervention.    Interactive Complexity:  None    Assignment:  Call grandmother to connect about surgery plan  Sit down with workplace HR  Attend DMV appointment on July 5 to update license following name change.     DSM-5 Diagnoses:  302.85 Gender  Dysphoria in Adolescents and Adults  296.32 - Major Depressive Disorder - recurrent, moderate (not discussed)   299.00 - Autism Spectrum Disorder - by history - by history (not discussed)  314.00 - Attention Deficit/Hyperactivity Disorder - predominantly inattentive type - by history (not discussed)    Plan/Need for Future Services:  Return for therapy in two weeks to treat diagnosed problem.        Saira Whitmore, PhD - Postdoctoral Fellow        TREATMENT PLAN  Date of Treatment Plan: 2/10/21  Name: Silvia AGATHA Fritz (Isaiah; he/him)  : 1995  Medical Record Number: [id number]  Treating Provider: Saira Whitmore, PhD - Postdoctoral Fellow   Type of Session: Individual  Present in Session: Isaiah    Current Status:  Depression/Mood:  Sad  Decreased Interest  Decreased Energy  Decreased Self-Esteem  Irritable  Decreased Concentration    Anxiety/Panic:  Worry  Social Fear    Thought:   Reports no problems or symptoms at this time    Sensorium:  Reports no problems or symptoms at this time    Behavior/Health:  Reports no problems or symptoms at this time    Chemical Use:  Denies both Alcohol and Drug Abuse    Sexual Problems:  Reports no problems or symptoms at this time    Gender Problems:  Body and social dysphoria    Suicide Risk Assessment:  Assessed Level of Immediate Risk:  YES  Ideation:  NO  Plan:  NO  Means:  NO  Intent:  NO    Homicide Risk Assessment:  Assessed Level of Immediate Risk:  YES  Ideation:  NO  Plan:  NO  Means:  NO  Intent:  NO    Impact of Symptoms on Function:  Decreased Social/Family Function  Decreased Work Function    DSM-5 Diagnoses:   302.85 Gender Dysphoria in Adolescents and Adults  296.32 - Major Depressive Disorder - recurrent, moderate   299.00 - Autism Spectrum Disorder - by history - by history  314.00 - Attention Deficit/Hyperactivity Disorder - predominantly inattentive type - by history    Screening Questionnaires:  Please indicate whether the following screening  questionnaires have been completed:  CAGE: No  PHQ-9: Yes    GWEN-7: Yes  Safety Screening: No  WHODAS: No    Problem(s):  1. Gender Dysphoria  2. Depression  3. ADHD  4. Autism Spectrum Disorder    Short-Long Term Goals  Decrease Symptoms  Increase Coping  Change Behavior  Change Cognitions  Increase Psychosocial/Support Functioning  Improve Communication  Enhance Relationships    Interventions  [place] Psychotherapy  Cognitive-Behavioral Therapy    Expected Outcomes and Prognosis  Expected improvement    Anticipated Treatment Duration:  Unknown    Frequency of Sessions  2 x / Month    Progress Update (for Plan Update Only)  Compliant, Progressing and Improving - Needs More Sessions    Current Psychoactive Medications:  See Psychiatric Treatment Plan  Discharge & Aftercare Goals: To Be Determined.  Care Coordination: Yes; ANA Powell; [facility] ([facility]); [facility] Gender Care (GAS)    Consent to Treatment:   Patient participated in this treatment planning process and indicated verbal agreement with the above treatment plan.  If patient doesn't sign, indicate why: Telehealth visit due to COVID19 pandemic    Patient Signature: unable to sign - verbal review and consent  Date: 2/10/21    Provider Signature:     Date: 2/10/21    Supervisor Signature:   Zuri Rudd, PhD  Licensed Psychologist     ]  Date: 2-19-21

## 2021-07-07 ENCOUNTER — VIRTUAL VISIT (OUTPATIENT)
Dept: PSYCHOLOGY | Facility: CLINIC | Age: 26
End: 2021-07-07
Payer: COMMERCIAL

## 2021-07-07 DIAGNOSIS — F64.0 GENDER DYSPHORIA IN ADOLESCENT AND ADULT: Primary | ICD-10-CM

## 2021-07-07 PROCEDURE — 99207 PR NO BILLABLE SERVICE THIS VISIT: CPT | Performed by: STUDENT IN AN ORGANIZED HEALTH CARE EDUCATION/TRAINING PROGRAM

## 2021-07-07 PROCEDURE — 90832 PSYTX W PT 30 MINUTES: CPT | Mod: GT | Performed by: STUDENT IN AN ORGANIZED HEALTH CARE EDUCATION/TRAINING PROGRAM

## 2021-07-07 NOTE — PROGRESS NOTES
[facility] -  Case Progress Note    Date of Service: 21   Name: Silvia Fritz  Preferred name: Isaiah - daniel/roxana  : 1995  Medical Record Number: [id number]  Treating Provider: Saira Whitmore, PhD - Postdoctoral Fellow  Type of Session: Individual   Present in Session: Isaiah  Number of Minutes:  30  Diagnostic Assessment: 21  Treatment Plan:  2/10/21    Health Maintenance Summary - Mental Health Treatment Plan       Status Date      MENTAL HEALTH TX PLAN Next Due 2022      Done 2021 HIM MENTAL HEALTH TX PLAN SCAN     Done 2020 HIM MENTAL HEALTH TX PLAN SCAN          The author of this note documented a reason for not sharing it with the patient.    Video start time: 14:00  Video end time: 14:30    Telemedicine Visit: The patient's condition can be safely assessed and treated via synchronous audio and visual telemedicine encounter.      Reason for Telemedicine Visit: Service provided by telemedicine due to COVID-19 pandemic and [facility] directives.     Originating Site (Patient Location): Patient's home    Distant Site (Provider Location): Provider Remote Setting    Consent:  The patient has verbally consented to: the potential risks and benefits of telemedicine (video visit) versus in person care; bill my insurance or make self-payment for services provided; and responsibility for payment of non-covered services.     Mode of Communication:  Video Conference via Zoom    As the provider I attest to compliance with applicable laws and regulations related to telemedicine.    Current Symptoms/Status:  Incongruence between experienced/expressed gender (male) and birth assigned sex (female), lasting longer than six months and causing clinically significant distress, with endorsement of the following symptoms:   - marked incongruence between experience/expressed gender (male) and primary and secondary sex characteristics  - strong desire to be rid of  "birth assigned sex characteristics  - strong desire for secondary sex characteristics of gender other than birth assigned sex (male)  - strong desire to be read and treated as gender other than birth assigned sex (male)  - strong conviction that his feelings/experiences are typical of gender other than birth assigned sex      History of recurrent major depression, with current endorsement of symptoms in the mildly depressed range. Primary symptom of concern for Isaiah is irritability.     History of Autism Spectrum Disorder, diagnosed at 15.     Progress Toward Treatment Goals:   Has secured own health insurance with support of [employer]  Has top surgery date 09/2021 - considering reschedule b/c of aftercare needs  Began psychiatric care with ANA Powell  Legal name change May 18  Began testosterone therapy late July 2020 @ [facility]    Intervention: Modality and Description:  Primary intervention was psychoeducation and supportive psychotherapy towards gender goals. Isaiah shared that he has not yet called grandmother to follow up on her offer for top surgery aftercare support.  Discussed with Isaiah how to approach conversation with grandmother - feedback to Isaiah to not fixate on \"what dad probably didn't tell her\" and instead take ownership over helping grandmother learn what she needs to know. Discussed resources he can share with her, and how to involve her in pre-operative process. Discussed with Isaiah transfer of care to [facility], and he requested a joint session in early August. Discussed goals for upcoming work - given Isaiah's preference to work on 1-2 areas at a time, summer sessions will continue to focus on top surgery preparation, coming out at work, and changing all personal documentation following legal name change. Discussed importance of working on goals of independent living come Fall, and exploring programs such as [employer] to support this. He is open to being connected " with social service supports though remains doubtful that his situation (making too little money to live independently) is changeable. Isaiah was engaged, open, and responsive to intervention.    Interactive Complexity:  None    Assignment:  Call grandmother to connect about surgery plan  Sit down with workplace HR  Attend DMV appointment on  to update license following name change.     DSM-5 Diagnoses:  302.85 Gender Dysphoria in Adolescents and Adults  296.32 - Major Depressive Disorder - recurrent, moderate (not discussed)   299.00 - Autism Spectrum Disorder - by history - by history (not discussed)  314.00 - Attention Deficit/Hyperactivity Disorder - predominantly inattentive type - by history (not discussed)    Plan/Need for Future Services:  Return for therapy in two weeks to treat diagnosed problem.        Saira Whitmore, PhD - Postdoctoral Fellow        TREATMENT PLAN  Date of Treatment Plan: 2/10/21  Name: Silvia AGATHA Lam (Isaiah; he/him)  : 1995  Medical Record Number: [id number]  Treating Provider: Saira Whitmore, PhD - Postdoctoral Fellow   Type of Session: Individual  Present in Session: Isaiah    Current Status:  Depression/Mood:  Sad  Decreased Interest  Decreased Energy  Decreased Self-Esteem  Irritable  Decreased Concentration    Anxiety/Panic:  Worry  Social Fear    Thought:   Reports no problems or symptoms at this time    Sensorium:  Reports no problems or symptoms at this time    Behavior/Health:  Reports no problems or symptoms at this time    Chemical Use:  Denies both Alcohol and Drug Abuse    Sexual Problems:  Reports no problems or symptoms at this time    Gender Problems:  Body and social dysphoria    Suicide Risk Assessment:  Assessed Level of Immediate Risk:  YES  Ideation:  NO  Plan:  NO  Means:  NO  Intent:  NO    Homicide Risk Assessment:  Assessed Level of Immediate Risk:  YES  Ideation:  NO  Plan:  NO  Means:  NO  Intent:  NO    Impact of Symptoms on  Function:  Decreased Social/Family Function  Decreased Work Function    DSM-5 Diagnoses:   302.85 Gender Dysphoria in Adolescents and Adults  296.32 - Major Depressive Disorder - recurrent, moderate   299.00 - Autism Spectrum Disorder - by history - by history  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 Gender Dysphoria  2. Depression  3. ADHD  4. Autism Spectrum Disorder    Short-Long Term Goals  Decrease Symptoms  Increase Coping  Change Behavior  Change Cognitions  Increase Psychosocial/Support Functioning  Improve Communication  Enhance Relationships    Interventions  [place] Psychotherapy  Cognitive-Behavioral Therapy    Expected Outcomes and Prognosis  Expected improvement    Anticipated Treatment Duration:  Unknown    Frequency of Sessions  2 x / Month    Progress Update (for Plan Update Only)  Compliant, Progressing and Improving - Needs More Sessions    Current Psychoactive Medications:  See Psychiatric Treatment Plan  Discharge & Aftercare Goals: To Be Determined.  Care Coordination: Yes; ANA Powell; [facility] ([facility]); [facility] (GAS)    Consent to Treatment:   Patient participated in this treatment planning process and indicated verbal agreement with the above treatment plan.  If patient doesn't sign, indicate why: Telehealth visit due to COVID19 pandemic    Patient Signature: unable to sign - verbal review and consent  Date: 2/10/21    Provider Signature:     Date: 2/10/21    Supervisor Signature:   Zuri Rudd, PhD  Licensed Psychologist     ]  Date: 2-19-21

## 2021-07-22 ENCOUNTER — VIRTUAL VISIT (OUTPATIENT)
Dept: PSYCHOLOGY | Facility: CLINIC | Age: 26
End: 2021-07-22
Payer: COMMERCIAL

## 2021-07-22 DIAGNOSIS — F64.0 GENDER DYSPHORIA IN ADOLESCENT AND ADULT: Primary | ICD-10-CM

## 2021-07-22 PROCEDURE — 99207 PR NO BILLABLE SERVICE THIS VISIT: CPT | Performed by: STUDENT IN AN ORGANIZED HEALTH CARE EDUCATION/TRAINING PROGRAM

## 2021-07-22 PROCEDURE — 90837 PSYTX W PT 60 MINUTES: CPT | Mod: GT | Performed by: STUDENT IN AN ORGANIZED HEALTH CARE EDUCATION/TRAINING PROGRAM

## 2021-07-22 NOTE — PROGRESS NOTES
[facility] -  Case Progress Note    Date of Service: 21   Name: Silvia Fritz  Preferred name: Isaiah - daniel/roxana  : 1995  Medical Record Number: [id number]  Treating Provider: Saira Whitmore, PhD - Postdoctoral Fellow  Type of Session: Individual   Present in Session: Isaiah  Number of Minutes:  55  Diagnostic Assessment: 21  Treatment Plan:  2/10/21    Health Maintenance Summary - Mental Health Treatment Plan       Status Date      MENTAL HEALTH TX PLAN Next Due 2022      Done 2021 HIM MENTAL HEALTH TX PLAN SCAN     Done 2020 HIM MENTAL HEALTH TX PLAN SCAN          The author of this note documented a reason for not sharing it with the patient.    Video start time: 15:30  Video end time: 16:25    Telemedicine Visit: The patient's condition can be safely assessed and treated via synchronous audio and visual telemedicine encounter.      Reason for Telemedicine Visit: Service provided by telemedicine due to COVID-19 pandemic and [facility] directives.     Originating Site (Patient Location): Patient's home    Distant Site (Provider Location): Provider Remote Setting    Consent:  The patient has verbally consented to: the potential risks and benefits of telemedicine (video visit) versus in person care; bill my insurance or make self-payment for services provided; and responsibility for payment of non-covered services.     Mode of Communication:  Video Conference via Zoom    As the provider I attest to compliance with applicable laws and regulations related to telemedicine.    Current Symptoms/Status:  Incongruence between experienced/expressed gender (male) and birth assigned sex (female), lasting longer than six months and causing clinically significant distress, with endorsement of the following symptoms:   - marked incongruence between experience/expressed gender (male) and primary and secondary sex characteristics  - strong desire to be rid of  "birth assigned sex characteristics  - strong desire for secondary sex characteristics of gender other than birth assigned sex (male)  - strong desire to be read and treated as gender other than birth assigned sex (male)  - strong conviction that his feelings/experiences are typical of gender other than birth assigned sex      History of recurrent major depression, with current endorsement of symptoms in the mildly depressed range. Primary symptom of concern for Isaiah is irritability.     History of Autism Spectrum Disorder, diagnosed at 15.     Progress Toward Treatment Goals:   Continues working on legal documents following name change  Has top surgery date 09/15/2021 with Dr. Warren  Began psychiatric care with ANA Powell  Legal name change May 18  Began testosterone therapy late July 2020 @ [facility]    Intervention: Modality and Description:  Primary intervention was psychoeducation and supportive psychotherapy towards gender goals. Isaiah reported continued progress coming out at work, which has been relieving and scary for Isaiah. He reports that folks have been supportive. He shared about a very positive weekend spent with younger cousin - had \"boys weekend\" at grandparents' house and took younger cousin [place]. Reported feeling of euphoria with this, and enjoyed bonding with cousin. Isaiah shared that he chosen not to attend pride events this year, but might want to think about this in the future - reported that seeing younger folks at [employer] brings feelings of longing and sadness that he is not ready to feel just yet. Reviewed next session goals for joint visit with SB.  Isaiah was engaged, open, and responsive to intervention.    Interactive Complexity:  None    Assignment:  Call grandmother to connect about surgery plan  Sit down with workplace HR  Attend DMV appointment on July 5 to update license following name change.     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        Saira Whitmore, PhD - Postdoctoral Fellow        TREATMENT PLAN  Date of Treatment Plan: 2/10/21  Name: Silvia AGATHA Fritz (Isaiah; he/him)  : 1995  Medical Record Number: [id number]  Treating Provider: Saira Whitmore, PhD - Postdoctoral Fellow   Type of Session: Individual  Present in Session: Isaiah    Current Status:  Depression/Mood:  Sad  Decreased Interest  Decreased Energy  Decreased Self-Esteem  Irritable  Decreased Concentration    Anxiety/Panic:  Worry  Social Fear    Thought:   Reports no problems or symptoms at this time    Sensorium:  Reports no problems or symptoms at this time    Behavior/Health:  Reports no problems or symptoms at this time    Chemical Use:  Denies both Alcohol and Drug Abuse    Sexual Problems:  Reports no problems or symptoms at this time    Gender Problems:  Body and social dysphoria    Suicide Risk Assessment:  Assessed Level of Immediate Risk:  YES  Ideation:  NO  Plan:  NO  Means:  NO  Intent:  NO    Homicide Risk Assessment:  Assessed Level of Immediate Risk:  YES  Ideation:  NO  Plan:  NO  Means:  NO  Intent:  NO    Impact of Symptoms on Function:  Decreased Social/Family Function  Decreased Work Function    DSM-5 Diagnoses:   302.85 Gender Dysphoria in Adolescents and Adults  296.32 - Major Depressive Disorder - recurrent, moderate   299.00 - Autism Spectrum Disorder - by history - by history  314.00 - Attention Deficit/Hyperactivity Disorder - predominantly inattentive type - by history    Screening Questionnaires:  Please indicate whether the following screening  questionnaires have been completed:  CAGE: No  PHQ-9: Yes    GWEN-7: Yes  Safety Screening: No  WHODAS: No    Problem(s):  1. Gender Dysphoria  2. Depression  3. ADHD  4. Autism Spectrum Disorder    Short-Long Term Goals  Decrease Symptoms  Increase Coping  Change Behavior  Change Cognitions  Increase Psychosocial/Support Functioning  Improve Communication  Enhance Relationships    Interventions  [place] [facility]  Cognitive-Behavioral Therapy    Expected Outcomes and Prognosis  Expected improvement    Anticipated Treatment Duration:  Unknown    Frequency of Sessions  2 x / Month    Progress Update (for Plan Update Only)  Compliant, Progressing and Improving - Needs More Sessions    Current Psychoactive Medications:  See Psychiatric Treatment Plan  Discharge & Aftercare Goals: To Be Determined.  Care Coordination: Yes; ANA Powell; [facility] ([facility]); [facility] (GAS)    Consent to Treatment:   Patient participated in this treatment planning process and indicated verbal agreement with the above treatment plan.  If patient doesn't sign, indicate why: Telehealth visit due to COVID19 pandemic    Patient Signature: unable to sign - verbal review and consent  Date: 2/10/21    Provider Signature:     Date: 2/10/21    Supervisor Signature:   Zuri Rudd, PhD  Licensed Psychologist     ]  Date: 2-19-21

## 2021-07-29 DIAGNOSIS — F64.9 GENDER DYSPHORIA: ICD-10-CM

## 2021-07-29 RX ORDER — TESTOSTERONE CYPIONATE 200 MG/ML
60 INJECTION, SOLUTION INTRAMUSCULAR WEEKLY
Qty: 4 ML | Refills: 3 | Status: SHIPPED | OUTPATIENT
Start: 2021-07-29 | End: 2022-01-05

## 2021-08-04 ENCOUNTER — VIRTUAL VISIT (OUTPATIENT)
Dept: PSYCHOLOGY | Facility: CLINIC | Age: 26
End: 2021-08-04
Payer: COMMERCIAL

## 2021-08-04 DIAGNOSIS — F64.0 GENDER DYSPHORIA IN ADOLESCENT AND ADULT: Primary | ICD-10-CM

## 2021-08-04 PROCEDURE — 99207 PR NO BILLABLE SERVICE THIS VISIT: CPT | Performed by: STUDENT IN AN ORGANIZED HEALTH CARE EDUCATION/TRAINING PROGRAM

## 2021-08-04 PROCEDURE — 90834 PSYTX W PT 45 MINUTES: CPT | Mod: GT | Performed by: STUDENT IN AN ORGANIZED HEALTH CARE EDUCATION/TRAINING PROGRAM

## 2021-08-04 NOTE — PROGRESS NOTES
[facility] -  Case Progress Note    Date of Service: 21   Name: Silvia Fritz  Preferred name: Isaiah - he/roxana  : 1995  Medical Record Number: [id number]  Treating Provider: Saira Whitmore, PhD - Postdoctoral Fellow  Type of Session: Individual   Present in Session: Saira Colon, Mirna Silva (35 min); Bibi (5 min)   Number of Minutes:  40  Diagnostic Assessment: 21  Treatment Plan:  2/10/21    Health Maintenance Summary - Mental Health Treatment Plan       Status Date      MENTAL HEALTH TX PLAN Next Due 2022      Done 2021 HIM MENTAL HEALTH TX PLAN SCAN     Done 2020 HIM MENTAL HEALTH TX PLAN SCAN          The author of this note documented a reason for not sharing it with the patient.    Video start time: 15:10  Video end time: 15:50    Telemedicine Visit: The patient's condition can be safely assessed and treated via synchronous audio and visual telemedicine encounter.      Reason for Telemedicine Visit: Service provided by telemedicine due to COVID-19 pandemic and [facility] directives.     Originating Site (Patient Location): Patient's home    Distant Site (Provider Location): Provider Remote Setting    Consent:  The patient has verbally consented to: the potential risks and benefits of telemedicine (video visit) versus in person care; bill my insurance or make self-payment for services provided; and responsibility for payment of non-covered services.     Mode of Communication:  Video Conference via Zoom    As the provider I attest to compliance with applicable laws and regulations related to telemedicine.    Current Symptoms/Status:  Incongruence between experienced/expressed gender (male) and birth assigned sex (female), lasting longer than six months and causing clinically significant distress, with endorsement of the following symptoms:   - marked incongruence between experience/expressed gender (male) and  primary and secondary sex characteristics  - strong desire to be rid of birth assigned sex characteristics  - strong desire for secondary sex characteristics of gender other than birth assigned sex (male)  - strong desire to be read and treated as gender other than birth assigned sex (male)  - strong conviction that his feelings/experiences are typical of gender other than birth assigned sex      History of recurrent major depression, with current endorsement of symptoms in the mildly depressed range. Primary symptom of concern for Isaiah is irritability.     History of Autism Spectrum Disorder, diagnosed at 15.     Progress Toward Treatment Goals:   Continues working on legal documents following name change  Has top surgery date 09/15/2021 with Dr. Warren  Began psychiatric care with ANA Powell  Legal name change May 18  Began testosterone therapy late July 2020 @ [facility]    Intervention: Modality and Description:  Primary intervention was psychoeducation and supportive psychotherapy towards gender goals.   This was a joint session with incoming provider SB. Purpose of session was to facilitate transfer of care. SB introduced self and therapeutic approach. Isaiah introduced self and discussed primary areas of focus during the past year (gender goals related to social and medical transition), and upcoming goals (top surgery support, continued support around coming out at work, independent living). Offered resource of [employer] [place] for support navigating name change process with DMV, social security, etc. following receipt of court order. Isaiah reported session felt comfortable, we discussed scheduling with SB in September. Isaiah was engaged, open, and responsive to intervention.    Interactive Complexity:  None    Assignment:  Call grandmother to connect about surgery plan  Sit down with workplace HR  Attend DMV appointment on July 5 to update license following name change.     DSM-5  Diagnoses:  302.85 Gender Dysphoria in Adolescents and Adults  296.32 - Major Depressive Disorder - recurrent, moderate (not discussed)   299.00 - Autism Spectrum Disorder - by history - by history (not discussed)  314.00 - Attention Deficit/Hyperactivity Disorder - predominantly inattentive type - by history (not discussed)    Plan/Need for Future Services:  Return for therapy in two weeks to treat diagnosed problem.        Saira Whitmore, PhD - Postdoctoral Fellow        TREATMENT PLAN  Date of Treatment Plan: 2/10/21  Name: Silvia Fritz (Isaiah; he/him)  : 1995  Medical Record Number: [id number]  Treating Provider: Saira Whitmore, PhD - Postdoctoral Fellow   Type of Session: Individual  Present in Session: Isaiah    Current Status:  Depression/Mood:  Sad  Decreased Interest  Decreased Energy  Decreased Self-Esteem  Irritable  Decreased Concentration    Anxiety/Panic:  Worry  Social Fear    Thought:   Reports no problems or symptoms at this time    Sensorium:  Reports no problems or symptoms at this time    Behavior/Health:  Reports no problems or symptoms at this time    Chemical Use:  Denies both Alcohol and Drug Abuse    Sexual Problems:  Reports no problems or symptoms at this time    Gender Problems:  Body and social dysphoria    Suicide Risk Assessment:  Assessed Level of Immediate Risk:  YES  Ideation:  NO  Plan:  NO  Means:  NO  Intent:  NO    Homicide Risk Assessment:  Assessed Level of Immediate Risk:  YES  Ideation:  NO  Plan:  NO  Means:  NO  Intent:  NO    Impact of Symptoms on Function:  Decreased Social/Family Function  Decreased Work Function    DSM-5 Diagnoses:   302.85 Gender Dysphoria in Adolescents and Adults  296.32 - Major Depressive Disorder - recurrent, moderate   299.00 - Autism Spectrum Disorder - by history - by history  314.00 - Attention Deficit/Hyperactivity Disorder - predominantly inattentive type - by history    Screening Questionnaires:  Please indicate whether the  following screening questionnaires have been completed:  CAGE: No  PHQ-9: Yes    GWEN-7: Yes  Safety Screening: No  WHODAS: No    Problem(s):  1. Gender Dysphoria  2. Depression  3. ADHD  4. Autism Spectrum Disorder    Short-Long Term Goals  Decrease Symptoms  Increase Coping  Change Behavior  Change Cognitions  Increase Psychosocial/Support Functioning  Improve Communication  Enhance Relationships    Interventions  [place] [facility]  Cognitive-Behavioral Therapy    Expected Outcomes and Prognosis  Expected improvement    Anticipated Treatment Duration:  Unknown    Frequency of Sessions  2 x / Month    Progress Update (for Plan Update Only)  Compliant, Progressing and Improving - Needs More Sessions    Current Psychoactive Medications:  See Psychiatric Treatment Plan  Discharge & Aftercare Goals: To Be Determined.  Care Coordination: Yes; ANA Powell; [facility] (HRT); Comprehensive Gender Care (GAS)    Consent to Treatment:   Patient participated in this treatment planning process and indicated verbal agreement with the above treatment plan.  If patient doesn't sign, indicate why: Telehealth visit due to COVID19 pandemic    Patient Signature: unable to sign - verbal review and consent  Date: 2/10/21    Provider Signature:     Date: 2/10/21    Supervisor Signature:   Zuri Rudd, PhD  Licensed Psychologist     ]  Date: 2-19-21

## 2021-08-09 ENCOUNTER — OFFICE VISIT (OUTPATIENT)
Dept: FAMILY MEDICINE | Facility: CLINIC | Age: 26
End: 2021-08-09
Payer: COMMERCIAL

## 2021-08-09 VITALS
DIASTOLIC BLOOD PRESSURE: 63 MMHG | SYSTOLIC BLOOD PRESSURE: 95 MMHG | TEMPERATURE: 98.7 F | HEART RATE: 73 BPM | WEIGHT: 108 LBS | OXYGEN SATURATION: 97 % | BODY MASS INDEX: 18.21 KG/M2 | RESPIRATION RATE: 16 BRPM

## 2021-08-09 DIAGNOSIS — F64.9 GENDER DYSPHORIA: ICD-10-CM

## 2021-08-09 PROCEDURE — 99214 OFFICE O/P EST MOD 30 MIN: CPT | Mod: GC | Performed by: STUDENT IN AN ORGANIZED HEALTH CARE EDUCATION/TRAINING PROGRAM

## 2021-08-09 RX ORDER — SYRINGE, DISPOSABLE, 1 ML
SYRINGE, EMPTY DISPOSABLE MISCELLANEOUS
Qty: 25 EACH | Refills: 11 | Status: SHIPPED | OUTPATIENT
Start: 2021-08-09 | End: 2022-06-23

## 2021-08-09 NOTE — PROGRESS NOTES
Preceptor Attestation:   Patient seen and discussed with the resident. Assessment and plan reviewed with resident and agreed upon.   Supervising Physician:  DO [facility]

## 2021-08-09 NOTE — PROGRESS NOTES
"  Assessment & Plan     Gender dysphoria  25 year old transmale presenting for HRT follow-up. Continues on testosterone 60mg SQ weekly (last dose adjusted 7/29/21). Happy with physical changes on current dose. Periods remain suppressed. Plan for repeat LFTs, Hgb, lipids, midcycle testosterone for interval screening. Patient interested in pursuing hysterectomy. Has been on testosterone for 1 year. Gyn referral placed for consideration of surgical procedure. Will follow-up with any medication adjustments pending lab results, will communicate via [place]: online [employer] regarding results. Patient agreeable to plan of care.   Follow up:  Follow up in 3-6 months pending testosterone level. Goal range -1000 upper end to induce changes  - needle, disp, 18G X 1\" MISC; Use for weekly testosterone injection  - Needle, Disp, 25G X 5/8\" MISC; Use to inject testosterone weekly.  - syringe, disposable, (B-D SYRINGE LUER-TOMY) 1 ML MISC; To use with weekly injection  - Testosterone total; Future  - Hemoglobin; Future  - Hepatic panel; Future  - Glucose; Future  - Lipid panel; Future  - OB/GYN REFERRAL - INTERNAL        No follow-ups on file.    Margie Murray DO  [facility] ClinicMARLON Murray DO  [facility]  PGY-2    Subjective     Isaiah is a 26 year old individual that uses pronouns He/Him/His/Himself that presents today for follow up of:  masculinizing hormone therapy.      Gender identity: male     Any special concerns today?      Hips are dysphroric still wonders about body fat distribution  Wondering if based on body habitus itf its different to change the     Timeline to get hysterectomy?  Declines cervical cancer screening at this time    Mid cycle Labs with pre-op appotinment needs by 8/15 or after         Was still bleeding monthly at 2/2/21 check up, cycles finally stopped, thinks around Feb 2021 was the last one. No sexual activity with sperm around organs in last 6 months.      Planning on top " surgery. Was seen by plastic surgery 4/13/21. Mammogram scheduled 5/13/21. Letter for insurance was written in last 1-2 weeks for approval, gave a window of anywhere from Aug - Jan. No questions or concerns at this point regarding surgery, just irritated with the wait.       On hormones?  YES, testosterone 60mg inj q week +++ Shot day of the week? Thursday   Due for labs?  Yes +++ Refills of meds needed? Refills of testosterone needed.      Gender affirmation is being sought in these other ways:   Top surgery   Hysterectomy        3 point ROS negative      Objective    BP 95/63   Pulse 73   Temp 98.7  F (37.1  C) (Oral)   Resp 16   Wt 49 kg (108 lb)   SpO2 97%   BMI 18.21 kg/m    Body mass index is 18.21 kg/m .  Physical Exam   GENERAL: healthy, alert and no distress  NECK: No thyromegaly, no thyroid TTP, no palapable cervical lymphadenopathy  RESP: lungs clear to auscultation - no rales, no rhonchi, no wheezes  CV: regular rates and rhythm, normal S1 S2, no S3 or S4 and no murmur, no click or rub -  ABDOMEN: soft, no tenderness, no  hepatosplenomegaly, no masses,   MS: extremities normal- no gross deformities noted, no edema at BL LE  SKIN: Scattered comedomes and pustules at forehead. no nodular cystic lesions. no suspicious lesions, no rashes otherwise at exposed skin  Psych: Alert and oriented times 3; coherent speech, normal rate and volume, able to articulate logical thoughts, able to abstract reason, no tangential thoughts, no hallucinations or delusions. Affect Appropriate/mood-congruent

## 2021-08-18 ENCOUNTER — TELEPHONE (OUTPATIENT)
Dept: PSYCHIATRY | Facility: CLINIC | Age: 26
End: 2021-08-18

## 2021-08-18 ENCOUNTER — VIRTUAL VISIT (OUTPATIENT)
Dept: PSYCHIATRY | Facility: CLINIC | Age: 26
End: 2021-08-18
Attending: NURSE PRACTITIONER
Payer: COMMERCIAL

## 2021-08-18 DIAGNOSIS — F98.8 ATTENTION DEFICIT DISORDER, UNSPECIFIED HYPERACTIVITY PRESENCE: Primary | ICD-10-CM

## 2021-08-18 DIAGNOSIS — F84.0 AUTISM: ICD-10-CM

## 2021-08-18 PROCEDURE — 99214 OFFICE O/P EST MOD 30 MIN: CPT | Mod: TEL | Performed by: NURSE PRACTITIONER

## 2021-08-18 RX ORDER — ATOMOXETINE 10 MG/1
10 CAPSULE ORAL DAILY
Qty: 90 CAPSULE | Refills: 0 | Status: SHIPPED | OUTPATIENT
Start: 2021-08-18 | End: 2021-11-17

## 2021-08-18 NOTE — TELEPHONE ENCOUNTER
On August 18, 2021, at 2:34 PM, writer called patient at [phone] to confirm Virtual Visit. Writer unable to make contact with patient. Writer left detailed voice message for call back. [phone] left as call back number. Sophia Wisdom, [facility]
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
## 2021-08-18 NOTE — PATIENT INSTRUCTIONS
-Continue on current medication regimen.    Rui to help with medication reminders  -[facility] https://apps.[employer].GeniusMatcher/us/rui/round-health/ae5095108259  -Dosecast https://apps.MeetDoctor/us/rui/dosecast-my-pill-reminder-rui/hf104250461  -Medisafe https://Concur [employer]/store/apps/details?id=com.shinfe.android.client&hl=en_US&gl=US  -[facility] https://www.Starline/best-medicine-reminder-apps/#[facility]    Your next appointment is scheduled on 11/17/2021 (Wed) at 3pm.    Thank you for coming to the [facility].    Lab Testing:  If you had lab testing today and your results are reassuring or normal they will be mailed to you or sent through [employer] within 7 days. If the lab tests need quick action we will call you with the results. The phone number we will call with results is # [phone] (home) . If this is not the best number please call our clinic and change the number.    Medication Refills:  If you need any refills please call your pharmacy and they will contact us. Our fax number for refills is [phone]. Please allow three business for refill processing. If you need to  your refill at a new pharmacy, please contact the new pharmacy directly. The new pharmacy will help you get your medications transferred.     Scheduling:  If you have any concerns about today's visit or wish to schedule another appointment please call our office during normal business hours [phone] (8-5:00 M-F)    Contact Us:  Please call [phone] during business hours (8-5:00 M-F).  If after clinic hours, or on the weekend, please call  [phone].    Financial Assistance [phone]  [facility] Billing [phone]  Central Billing Office, ealth: [phone]  [facility] Billing [phone]  Medical Records [phone]      MENTAL HEALTH CRISIS NUMBERS:  For a medical emergency please call  911 or go to the nearest ER.     [facility]:   [facility]  [facility] -[phone]   Crisis Residence [facility] -[phone]   Walk-In Counseling Center [facility] -[phone]   COPE 24/7 [facility] Mobile Team -[phone] (adults)/[phone] (child)  CHILD: [facility] needs assessment team - [phone]      [facility]:   [facility] - [phone]   Walk-in counseling [facility] - [phone]   Walk-in counseling [facility] - [phone]   Crisis Residence [facility] - [phone]  Urgent Care Adult Mental Mxdagm-[phone] mobile unit/ 24/7 crisis line    National Crisis Numbers:   National Suicide Prevention Lifeline: 1-718-787-TALK (910-192-8103)  Poison Control Center - 1-608.148.5050  Tengah/resources for a list of additional resources (SOS)  Trans Lifeline a hotline for transgender people 1-694.926.5959  The Loy Project a hotline for LGBT youth 1-454.472.5326  Crisis Text Line: For any crisis 24/7   To: 363072  see www.crisistextline.org  - IF MAKING A CALL FEELS TOO HARD, send a text!         Again thank you for choosing [facility] and please let us know how we can best partner with you to improve you and your family's health.    You may be receiving a survey regarding this appointment. We would love to have your feedback, both positive and negative. The survey is done by an external company, so your answers are anonymous.

## 2021-08-19 ENCOUNTER — VIRTUAL VISIT (OUTPATIENT)
Dept: PSYCHOLOGY | Facility: CLINIC | Age: 26
End: 2021-08-19
Payer: COMMERCIAL

## 2021-08-19 DIAGNOSIS — F64.0 GENDER DYSPHORIA IN ADOLESCENT AND ADULT: Primary | ICD-10-CM

## 2021-08-19 PROCEDURE — 90837 PSYTX W PT 60 MINUTES: CPT | Mod: GT | Performed by: STUDENT IN AN ORGANIZED HEALTH CARE EDUCATION/TRAINING PROGRAM

## 2021-08-19 PROCEDURE — 99207 PR NO BILLABLE SERVICE THIS VISIT: CPT | Performed by: STUDENT IN AN ORGANIZED HEALTH CARE EDUCATION/TRAINING PROGRAM

## 2021-08-19 NOTE — PROGRESS NOTES
[facility] -  Case Progress Note    Date of Service: 21   Name: Silvia Fritz  Preferred name: Isaiah - daniel/roxana  : 1995  Medical Record Number: [id number]  Treating Provider: Saira Whitmore, PhD - Postdoctoral Fellow  Type of Session: Individual   Present in Session: Isaiah  Number of Minutes:  55  Diagnostic Assessment: 21  Treatment Plan:  2/10/21    Health Maintenance Summary - Mental Health Treatment Plan       Status Date      MENTAL HEALTH TX PLAN Next Due 2022      Done 2021 HIM MENTAL HEALTH TX PLAN SCAN     Done 2020 HIM MENTAL HEALTH TX PLAN SCAN          The author of this note documented a reason for not sharing it with the patient.    Video start time: 14:00  Video end time: 14:55    Telemedicine Visit: The patient's condition can be safely assessed and treated via synchronous audio and visual telemedicine encounter.      Reason for Telemedicine Visit: Service provided by telemedicine due to COVID-19 pandemic and [facility] directives.     Originating Site (Patient Location): Patient's home    Distant Site (Provider Location): Provider Remote Setting    Consent:  The patient has verbally consented to: the potential risks and benefits of telemedicine (video visit) versus in person care; bill my insurance or make self-payment for services provided; and responsibility for payment of non-covered services.     Mode of Communication:  Video Conference via Zoom    As the provider I attest to compliance with applicable laws and regulations related to telemedicine.    Current Symptoms/Status:  Incongruence between experienced/expressed gender (male) and birth assigned sex (female), lasting longer than six months and causing clinically significant distress, with endorsement of the following symptoms:   - marked incongruence between experience/expressed gender (male) and primary and secondary sex characteristics  - strong desire to be rid of  birth assigned sex characteristics  - strong desire for secondary sex characteristics of gender other than birth assigned sex (male)  - strong desire to be read and treated as gender other than birth assigned sex (male)  - strong conviction that his feelings/experiences are typical of gender other than birth assigned sex      History of recurrent major depression, with current endorsement of symptoms in the mildly depressed range. Primary symptom of concern for Isaiah is irritability.     History of Autism Spectrum Disorder, diagnosed at 15.     Progress Toward Treatment Goals:   Terminated with this writer and will transfer care  Continues working on legal documents following name change  Has top surgery date 09/15/2021 with Dr. Warren  Began psychiatric care with ANA Powell  Legal name change May 18  Began testosterone therapy late July 2020 @ [facility]    Intervention: Modality and Description:  Primary intervention was psychoeducation and supportive psychotherapy towards gender goals. This was final session with Isaiah, and session focused on processing termination and transfer of care. Reviewed progress made in therapy on social and medical transition and shared appreciation for our time together. Isaiah looking forward to continued work - immediate goals include preparing for top surgery and continuing to work on changing legal documents following name change. He is interested in a referral to Anamika Talavera, SLP, at a later point in time to discuss potential work on social communication as it relates to gender. Isaiah was engaged, open, and responsive to intervention.    Interactive Complexity:  None    Assignment:  Call grandmother to connect about surgery plan  Sit down with workplace HR  Attend DMV appointment on July 5 to update license following name change.     DSM-5 Diagnoses:  302.85 Gender Dysphoria in Adolescents and Adults  296.32 - Major Depressive Disorder - recurrent,  "moderate (not discussed)   299.00 - Autism Spectrum Disorder - by history - by history (not discussed)  314.00 - Attention Deficit/Hyperactivity Disorder - predominantly inattentive type - by history (not discussed)    Plan/Need for Future Services:  [facility] care [facility].        Saira Whitmore, PhD - Postdoctoral Fellow        TREATMENT PLAN  Date of Treatment Plan: 2/10/21  Name: Silvia Fritz (Isaiah; he/him)  : 1995  Medical Record Number: [id number]  Treating Provider: Saira Whitmore, PhD - Postdoctoral Fellow   Type of Session: Individual  Present in Session: Iasiah    Current Status:  Depression/Mood:  Sad  Decreased Interest  Decreased Energy  Decreased Self-Esteem  Irritable  Decreased Concentration    Anxiety/Panic:  Worry  Social Fear    Thought:   Reports no problems or symptoms at this time    Sensorium:  Reports no problems or symptoms at this time    Behavior/Health:  Reports no problems or symptoms at this time    Chemical Use:  Denies both Alcohol and Drug Abuse    Sexual Problems:  Reports no problems or symptoms at this time    Gender Problems:  Body and social dysphoria    Suicide Risk Assessment:  Assessed Level of Immediate Risk:  YES  Ideation:  NO  Plan:  NO  Means:  NO  Intent:  NO    Homicide Risk Assessment:  Assessed Level of Immediate Risk:  YES  Ideation:  NO  Plan:  NO  Means:  NO  Intent:  NO    Impact of Symptoms on Function:  Decreased Social/Family Function  Decreased Work Function    DSM-5 Diagnoses:   302.85 Gender Dysphoria in Adolescents and Adults  296.32 - Major Depressive Disorder - recurrent, moderate   299.00 - Autism Spectrum Disorder - by history - by history  314.00 - Attention Deficit/Hyperactivity Disorder - predominantly inattentive type - by history    Screening Questionnaires:  Please indicate whether the following screening questionnaires have been completed:  CAGE: No  PHQ-9: Yes    GWEN-7: Yes  Safety Screening: No  WHODAS: " No    Problem(s):  1. Gender Dysphoria  2. Depression  3. ADHD  4. Autism Spectrum Disorder    Short-Long Term Goals  Decrease Symptoms  Increase Coping  Change Behavior  Change Cognitions  Increase Psychosocial/Support Functioning  Improve Communication  Enhance Relationships    Interventions  Mitchells Psychotherapy  Cognitive-Behavioral Therapy    Expected Outcomes and Prognosis  Expected improvement    Anticipated Treatment Duration:  Unknown    Frequency of Sessions  2 x / Month    Progress Update (for Plan Update Only)  Compliant, Progressing and Improving - Needs More Sessions    Current Psychoactive Medications:  See Psychiatric Treatment Plan  Discharge & Aftercare Goals: To Be Determined.  Care Coordination: Yes; ANA Powell; [facility] ([facility]); [facility] ([facility])    Consent to Treatment:   Patient participated in this treatment planning process and indicated verbal agreement with the above treatment plan.  If patient doesn't sign, indicate why: Telehealth visit due to COVID19 pandemic    Patient Signature: unable to sign - verbal review and consent  Date: 2/10/21    Provider Signature:     Date: 2/10/21    Supervisor Signature:   Zuri Rudd, PhD  Licensed Psychologist     ]  Date: 2-19-21

## 2021-08-30 NOTE — TELEPHONE ENCOUNTER
FUTURE VISIT INFORMATION      SURGERY INFORMATION:    Date: 9/15/21    Location:  or    Surgeon:   Ani Warren MD    Anesthesia Type:  general    Procedure: bilateral simple mastectomy, possible nipple grafts. OnQ    RECORDS REQUESTED FROM:       [facility]: [facility]    Most recent EKG+ Tracin/23/10- Luigi

## 2021-08-31 ENCOUNTER — PRE VISIT (OUTPATIENT)
Dept: SURGERY | Facility: CLINIC | Age: 26
End: 2021-08-31

## 2021-08-31 ENCOUNTER — ANESTHESIA EVENT (OUTPATIENT)
Dept: SURGERY | Facility: CLINIC | Age: 26
End: 2021-08-31

## 2021-08-31 ENCOUNTER — OFFICE VISIT (OUTPATIENT)
Dept: SURGERY | Facility: CLINIC | Age: 26
End: 2021-08-31
Payer: COMMERCIAL

## 2021-08-31 ENCOUNTER — LAB (OUTPATIENT)
Dept: LAB | Facility: CLINIC | Age: 26
End: 2021-08-31
Payer: COMMERCIAL

## 2021-08-31 ENCOUNTER — OFFICE VISIT (OUTPATIENT)
Dept: PLASTIC SURGERY | Facility: CLINIC | Age: 26
End: 2021-08-31
Payer: COMMERCIAL

## 2021-08-31 VITALS
WEIGHT: 109 LBS | BODY MASS INDEX: 18.61 KG/M2 | TEMPERATURE: 98 F | HEART RATE: 68 BPM | RESPIRATION RATE: 12 BRPM | HEIGHT: 64 IN | DIASTOLIC BLOOD PRESSURE: 73 MMHG | OXYGEN SATURATION: 98 % | SYSTOLIC BLOOD PRESSURE: 110 MMHG

## 2021-08-31 VITALS
DIASTOLIC BLOOD PRESSURE: 78 MMHG | SYSTOLIC BLOOD PRESSURE: 118 MMHG | WEIGHT: 105 LBS | HEART RATE: 65 BPM | OXYGEN SATURATION: 97 % | HEIGHT: 64 IN | BODY MASS INDEX: 17.93 KG/M2 | TEMPERATURE: 98.2 F

## 2021-08-31 DIAGNOSIS — F64.0 GENDER DYSPHORIA IN ADOLESCENT AND ADULT: Primary | ICD-10-CM

## 2021-08-31 DIAGNOSIS — F64.9 GENDER DYSPHORIA: ICD-10-CM

## 2021-08-31 DIAGNOSIS — Z01.818 PREOP EXAMINATION: Primary | ICD-10-CM

## 2021-08-31 LAB
ALBUMIN SERPL-MCNC: 4 G/DL (ref 3.4–5)
ALP SERPL-CCNC: 114 U/L (ref 40–150)
ALT SERPL W P-5'-P-CCNC: 16 U/L (ref 0–70)
AST SERPL W P-5'-P-CCNC: 10 U/L (ref 0–45)
BILIRUB DIRECT SERPL-MCNC: 0.2 MG/DL (ref 0–0.2)
BILIRUB SERPL-MCNC: 0.9 MG/DL (ref 0.2–1.3)
HGB BLD-MCNC: 15.3 G/DL (ref 11.7–17.7)
PROT SERPL-MCNC: 7.6 G/DL (ref 6.8–8.8)

## 2021-08-31 PROCEDURE — 85018 HEMOGLOBIN: CPT | Performed by: PATHOLOGY

## 2021-08-31 PROCEDURE — 99212 OFFICE O/P EST SF 10 MIN: CPT | Performed by: SURGERY

## 2021-08-31 PROCEDURE — 84403 ASSAY OF TOTAL TESTOSTERONE: CPT | Mod: 90 | Performed by: PATHOLOGY

## 2021-08-31 PROCEDURE — 80076 HEPATIC FUNCTION PANEL: CPT | Performed by: PATHOLOGY

## 2021-08-31 PROCEDURE — 99204 OFFICE O/P NEW MOD 45 MIN: CPT | Performed by: CLINICAL NURSE SPECIALIST

## 2021-08-31 PROCEDURE — 36415 COLL VENOUS BLD VENIPUNCTURE: CPT | Performed by: PATHOLOGY

## 2021-08-31 RX ORDER — ACETAMINOPHEN 325 MG/1
325-650 TABLET ORAL EVERY 6 HOURS PRN
COMMUNITY
End: 2022-01-17

## 2021-08-31 ASSESSMENT — PAIN SCALES - GENERAL
PAINLEVEL: NO PAIN (0)
PAINLEVEL: NO PAIN (0)

## 2021-08-31 ASSESSMENT — MIFFLIN-ST. JEOR
SCORE: 1201.28
SCORE: 1219.42

## 2021-08-31 NOTE — ANESTHESIA PREPROCEDURE EVALUATION
Anesthesia Pre-Procedure Evaluation    Patient: Silvia Fritz   MRN: [id number] : 1995        Preoperative Diagnosis: * No surgery found *   Procedure :      Past Medical History:   Diagnosis Date     ADHD (attention deficit hyperactivity disorder)      Anxiety      Depressive disorder      Gender dysphoria       Past Surgical History:   Procedure Laterality Date     Surgery of urinary tract      as toddler      Allergies   Allergen Reactions     Dairy Products [Milk Protein Extract]      Egg [Chicken-Derived Products (Egg)]       Social History     Tobacco Use     Smoking status: Never Smoker     Smokeless tobacco: Never Used   Substance Use Topics     Alcohol use: Never      Wt Readings from Last 1 Encounters:   21 49.4 kg (109 lb)        Anesthesia Evaluation   Pt has had prior anesthetic. Type: General.    No history of anesthetic complications       ROS/MED HX  ENT/Pulmonary:  - neg pulmonary ROS     Neurologic: Comment: ADHD  Aspergers      Cardiovascular:     (+) -----No previous cardiac testing  (-) taking anticoagulants/antiplatelets   METS/Exercise Tolerance: >4 METS    Hematologic:  - neg hematologic  ROS     Musculoskeletal:  - neg musculoskeletal ROS     GI/Hepatic:  - neg GI/hepatic ROS     Renal/Genitourinary:  - neg Renal ROS     Endo:  - neg endo ROS     Psychiatric/Substance Use:     (+) psychiatric history anxiety and other (comment) (Gender dysphoria)     Infectious Disease:  - neg infectious disease ROS     Malignancy:  - neg malignancy ROS     Other:  - neg other ROS          Physical Exam    Airway        Mallampati: I   TM distance: > 3 FB   Neck ROM: full   Mouth opening: > 3 cm    Respiratory Devices and Support         Dental  no notable dental history         Cardiovascular          Rhythm and rate: regular and normal     Pulmonary           breath sounds clear to auscultation           OUTSIDE LABS:  CBC:   Lab Results   Component Value Date    HGB 14.4 2021    HGB  14.1 02/02/2021    HCT 47.5 (H) 02/02/2021    HCT 42.6 07/21/2020     BMP:   Lab Results   Component Value Date    .2 02/02/2021    .5 11/03/2020    POTASSIUM 3.8 02/02/2021    POTASSIUM 4.4 11/03/2020    CHLORIDE 105.7 02/02/2021    CHLORIDE 98.6 11/03/2020    CO2 23.2 02/02/2021    CO2 28.0 11/03/2020    BUN 10.4 02/02/2021    BUN 4.9 (L) 11/03/2020    CR 0.8 02/02/2021    CR 0.8 11/03/2020    GLC 90.5 02/02/2021    GLC 94.9 11/03/2020     COAGS: No results found for: PTT, INR, FIBR  POC: No results found for: BGM, HCG, HCGS  HEPATIC:   Lab Results   Component Value Date    ALBUMIN 3.8 05/11/2021    PROTTOTAL 7.0 05/11/2021    ALT 21 05/11/2021    AST 18 05/11/2021    ALKPHOS 111 05/11/2021    BILITOTAL 0.8 05/11/2021     OTHER:   Lab Results   Component Value Date    RANJEET 9.3 02/02/2021             PAC Discussion and Assessment    ASA Classification: 1  Case is suitable for: ASC  Anesthetic techniques and relevant risks discussed: GA and GA with regional block for post-op pain control  Invasive monitoring and risk discussed: No    Possibility and Risk of blood transfusion discussed: No            PAC Resident/NP Anesthesia Assessment: FREDDY Fritz is a 26 year old adult scheduled to undergo bilateral simple mastectomy, possible nipple grafts with Dr. Warren on 9/15/21. He has the following specific operative considerations:   - RCRI : No serious cardiac risks.   - VTE risk: 0.26-0.5%  - CAROL # of risks 0-1/8 = Low risk  - Risk of PONV score = 2-3.  If > 2, anti-emetic intervention recommended. If 3 or > anti emetic intervention recommended with two or more meds    --Gender dysphoria with above procedure now planned.   --No history of problems with anesthesia.  --No cardiac history, symptoms or meds. Good exercise tolerance.   --Nonsmoker. Denies pulmonary symptoms.   --Anxiety. No meds at this time. Some skin picking.   --ADHD Strattera daily but will hold on DOS.   --Pain management. Discussed  possibility of nerve block if appropriate for patient's procedure. Dr. Warren requesting OnQ. Final counseling and decisions by regional team on DOS.  --Difficult IV stick.      Patient is optimized and is acceptable candidate for the proposed procedure.  No further diagnostic evaluation is needed.       Reviewed and Signed by PAC Mid-Level Provider/Resident  Mid-Level Provider/Resident: ANA Lo, CNS  Date: 8/31/21  Time: 11:45am                               ANA Crowder CNS

## 2021-08-31 NOTE — LETTER
8/31/2021       RE: Silvia Fritz  [id number] [facility] [address]     Dear Colleague,    Thank you for referring your patient, Silvia Fritz, to the [facility] [place] [facility]. Please see a copy of my visit note below.    PLASTICS PRE-OP  This is a 26 year old biological female transitioning to male who presents for his pre-op visit prior to bilateral simple mastectomy with nipple graft reconstruction scheduled for 9/15/21. He is here today with his mom, Briana. The patient has had a negative mammogram (5/14/21), and his letter of support from Saira Whitmore has been received. History and physical were received (PAC visit 8/31/21). Patient's grandmother will be taking care of him for the 1st week postop since his parents are going on vacation.     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 The patient did not bring [place] paperwork today but will plan to bring it on the day of surgery.     Patient says that he has not heard anything about the procedure being approved by his insurance. We will have to look in to this ASAP.     My LPN, Palmira, discussed periop instructions with the patient including: not eating anything 8 hours prior to surgery, drinking clear liquids up to 2 hours before surgery except for morning medications with a sip of water, the preop shower with surgical soap which was given, and wearing a button- or zip-up shirt on the day of surgery. The patient was also instructed to avoid NSAIDs x 1 week both before AND after surgery, but he may take Tylenol post-op for pain as needed. The patient was provided with a folder of information on jeanie-op topics, including where the surgery will be.     I discussed the following with the patient; preop, intraop  and postop phases of care on the day of surgery, the placement of a bladder catheter during surgery that will likely be removed in recovery, postop cares and limitations with relation to home and work settings, 5-lb weight restriction for the first 3 weeks postop, and how long to maintain limited activities. We also discussed Zofran, oxycodone, Z-luci, and antipruritics which will be prescribed. We discussed that preventing constipation will be their responsibility, and we discussed methods such as aloe, prune juice or Miralax.     In addition, we went over the possible risks and complications involved with this elective procedure. These include but are not limited to: infection, bleeding, hematoma/seroma formation, and poor healing (including dehiscence, nipple graft loss, or hypertrophic scarring). We also discussed the possibility of altered chest sensation (either hypo or hypersensitive), residual deformities and asymmetries, possible further surgical revisions, and possible injury to surrounding neurovascular and musculoskeletal structures, including intra-axillary or intra-thoracic. We lastly discussed anesthetic risks including DVT/PE or cardiopulmonary events.      All questions were answered. Isaiah will bring any other questions that arise to the day of surgery.    Total time = 15 minutes, spent on the date of encounter doing chart review, history and physical, dressing changes, documentation, patient education, and any further activity as noted above.     This note was prepared on behalf of Ani Warren MD by Margot Sanchez, a trained medical scribe, based on my observations and the provider's statements to me.       Again, thank you for allowing me to participate in the care of your patient.      Sincerely,    Ani Warren MD

## 2021-08-31 NOTE — PROGRESS NOTES
PLASTICS PRE-OP  This is a 26 year old biological female transitioning to male who presents for his pre-op visit prior to bilateral simple mastectomy with nipple graft reconstruction scheduled for 9/15/21. He is here today with his mom, Briana. The patient has had a negative mammogram (5/14/21), and his letter of support from Saira Whitmore has been received. History and physical were received (PAC visit 8/31/21). Patient's grandmother will be taking care of him for the 1st week postop since his parents are going on vacation.     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 The patient did not bring [place] paperwork today but will plan to bring it on the day of surgery.     Patient says that he has not heard anything about the procedure being approved by his insurance. We will have to look in to this ASAP.     My LPN, Palmira, discussed periop instructions with the patient including: not eating anything 8 hours prior to surgery, drinking clear liquids up to 2 hours before surgery except for morning medications with a sip of water, the preop shower with surgical soap which was given, and wearing a button- or zip-up shirt on the day of surgery. The patient was also instructed to avoid NSAIDs x 1 week both before AND after surgery, but he may take Tylenol post-op for pain as needed. The patient was provided with a folder of information on jeanie-op topics, including where the surgery will be.     I discussed the following with the patient; preop, intraop and postop phases of care on the day of surgery, the placement of a bladder catheter during surgery that will likely be removed in recovery, postop cares and limitations with relation to home and work settings, 5-lb weight restriction for the first 3 weeks postop, and how long to maintain limited activities. We also discussed Zofran,  oxycodone, Z-luci, and antipruritics which will be prescribed. We discussed that preventing constipation will be their responsibility, and we discussed methods such as aloe, prune juice or Miralax.     In addition, we went over the possible risks and complications involved with this elective procedure. These include but are not limited to: infection, bleeding, hematoma/seroma formation, and poor healing (including dehiscence, nipple graft loss, or hypertrophic scarring). We also discussed the possibility of altered chest sensation (either hypo or hypersensitive), residual deformities and asymmetries, possible further surgical revisions, and possible injury to surrounding neurovascular and musculoskeletal structures, including intra-axillary or intra-thoracic. We lastly discussed anesthetic risks including DVT/PE or cardiopulmonary events.      All questions were answered. Isaiah will bring any other questions that arise to the day of surgery.    Total time = 15 minutes, spent on the date of encounter doing chart review, history and physical, dressing changes, documentation, patient education, and any further activity as noted above.     This note was prepared on behalf of Ani Warren MD by Margot Sanchez, a trained medical scribe, based on my observations and the provider's statements to me.

## 2021-08-31 NOTE — PATIENT INSTRUCTIONS
Bilateral Mastectomy   Pre and Post op Surgery Instructions    You are scheduled for Top Surgery with  grafts on 9/15/21 at 11:25 AM    You will need to arrive at 9:50 AM at the [facility] [address] [place] [address]. You can park in the [facility] and check in on 5th floor.      - Please make sure you have someone to drive you home after your surgery and you will need someone to stay with you at home 24 hours after your surgery.    The surgery will be about 3-4 hours long. You will be in recovery afterwards for about 1-2 hours.     Before Surgery:  - 8 hours prior to surgery stop eating solid food. You can continue to drink clear liquids (water, apple juice, Gatorade) up to 2 hours before surgery. If you have any medications that need to be taken in this timeframe, you can take them with a small sip of water    - No Ibuprofen, Advil, Aleve, Naproxen, Motrin, Aspirin for 7 days prior to surgery. If you are having pain in the week before surgery Tylenol is okay to take.    - Wear or bring a button up or zip up shirt with you to the hospital.    - Wash with surgical soap:    Showering with an antiseptic soap prior to an invasive procedure will decrease the  bacteria that is normally found on the skin.   Using 1/2 of the bottle for each application.  Be sure to use the whole bottle before coming to surgery.  The evening before surgery, shower or bathe as you normally would, using your preferred soap.  Give special attention to areas where the incisions will be made. Rinse thoroughly.  You may wash your hair with your regular shampoo.   Next wash your entire body, from the chin down, with the special antiseptic soap (full strength) using a freshly laundered clean washcloth, again giving special attention to areas where incisions will be made.  Rinse thoroughly and dry off using a freshly laundered clean towel.   Wear clean clothes/pajamas and sleep on clean sheets  Repeat steps 2 and 3 in  the morning before coming to surgery (using the rest of the bottle of soap).     **If you have a reaction to the surgical soap, let the nurse know.     Events of day:     -Check in on the 3rd floor of the hospital for your surgery.    Pre-op     - After you check in, you will meet with a pre-op nurse. They will go over your medications, review your H&P (pre-op physical), have you change into a paper gown, and your chest will be wiped again with soap.    - They will place compression boots on both legs to help decrease risk of blood clots.     - You may be asked to complete a pregnancy test. If you have had no exposure to sperm, you can decline.    - You will meet with the anesthesiologists and nurse anesthetist who will be keeping you asleep during surgery, they will be placing an IV, and will be monitoring you throughout the surgery.    - You will meet with the RN as you are being moved into operating room, they will be helping to position you and keep you comfortable during the surgery.     - Dr. Warren will meet you in the pre-op area to discuss any questions about surgery, make markings on your chest for planning, and to sign your consent.     Intra-op (OR)    - A catheter will be placed in your bladder after you are sleeping and is typically removed before you wake up. The longest this would typically be in is in the post-op recovery room if needed.     - There will be straps and pillows to help position you and keep you in place during the surgery.    - The tissue that is removed will be sent to pathology to be analyzed and then discarded.     - ANA drains will be placed (one in each armpit) and a pain catheter will be placed in the center of your chest.     - Closure is done with dissolvable sutures under the skin and is then sealed with glue, and tape.    Post-op/Recovery    - You can usually go home the same day so long as you are eating, drinking, voiding, and pain is well controlled.  You will be sent  "home with all needed supplies.     - Post-Op medications you will be given in addition to the anesthesia include: Zofran (nausea), Oxycodone (pain), Z-luci (infection), and antipruritic (itching).     - You will leave the hospitals with an ace bandage wrapped around your chest for compression. You may keep the ace bandage on until you come back for your one week post op visit or you may adjust the wrap as needed if it is either too tight or too loose.     Post-Op Cares:     - No lifting over 5 lbs for 3 weeks after surgery    - No stretching arms out or above the head (\"modified T-callie\")    - Walk around frequently to help prevent blood clots    - Do deep breathing exercises to prevent pneumonia    - No sleeping on stomach x 3 weeks after surgery, if it is difficult not to roll over onto your stomach you can sleep in a recliner or use additional pillows    - Do not use any ice packs or heat packs    - Preventing constipation will be your responsibility. You can use whatever method works best for you such as; aloe, prune juice, Sennokot or Miralax.  No showering in the first week after your surgery.     What to expect at your 1st post op visit:    When you come in for your 1st post op visit, we will assess the output of your drains and remove the pain catheter (On-Q) that was placed on your chest.     -Please remember to bring the documentations of your output with you to your appointment so we can review how much your drains have been putting out since your surgery.     -We will remove the bolsters that was placed on your nipples and teach you how to perform nipple graft dressings.     -You will be given supplies to take home and will need to continue wearing the ace wrap compression for another week after the drains are removed.       Nipple Care:   Change nipple dressings daily.  Take dressings off prior to showering and reapply after showering.  No direct shower spray on nipple grafts for 4 weeks.     Cut vaseline " gauze to nipple size and place over nipple.  Place folded white gauze over vaseline gauze and cover with plastic dressing.  Do dressing changes for 1 more week.  If you continue to have drainage, continue the dressings until drainage stops.       Drain Care:  You will be discharged with Ike-Rivero (ANA) drainage tubes.  These tubes drain fluid from your incision, helping to prevent swelling and reducing the risk for infection.  The tube is held in place by a few stitches. Pin your ANA drain to your clothing by using a safety pin through the plastic loop on the top of the bulb. If the drain is not attached to your clothing, it may pull out from under your skin. Drains are uncomfortable!    Emptying the drain bulb: The measuring cup provided by the hospital or a measuring cup that is used only for the ANA drain.  Wash your hands with soap and water.  Hold the bulb securely.  Remove the drainage plug from the emptying port.  Carefully turn the bulb upside down over the measuring cup, and gently squeeze all of the drainage into the measuring cup.  Squeeze the middle of the bulb firmly so that the bulb is as flat as possible.                                                                                                                                                    While still squeezing the bulb, replace the drainage plug. This step is important to keep the drain suction  Measure how much fluid you removed from the bulb.  Write down the amount and color of the fluid you removed from the bulb. If  you have more than one drain, keep a separate record for each one.  Empty the fluid into the toilet and flush.  Rinse the measuring cup, and wash your hands with soap and water.  You should empty the drain at least two times each day, in the morning and at bedtime.  Drainage tubes are removed when the output is less than 20ml in a 24 hour period for 2 consecutive days.  Output will be somewhere between 30 to 50 mLs per day,  but don't be alarmed if it is more or less. Output may not be equal between sides. Amounts will probably decrease over time.  The color coming from the drains may vary from deep red, light pink, or clear yellow.    Stripping the tube:  To prevent clots from blocking the drain, you will need to  strip  it. Stripping means that you use your fingers to squeeze along the length of the drain to help maintain the flow of drainage.  Wash your hands.  Using one hand, firmly hold the tubing near the insertion site (as close to your skin as the ace wrap and gauze dressing will allow). This will prevent the drain from being pulled out while you are stripping it and from pulling on skin and sutures.  [place] upper/top fingers and milk with the bottom or lower fingers.  Using your index finger and thumb of the other hand, squeeze the tubing below the  first hand. You should squeeze it firmly enough so the tubing becomes flat.   Maintain pressure on the tube, as you are squeezing, slide your index finger and thumb down the tube about 4-6 inches toward the bulb. (use alcohol wipes or lotion to help).  Then, release the hand closest to where the tube is attached to the body (making sure to keep pressure with the other hand), and move upper/anchor hand down. Squeeze, Repeat in segments.  Do not release the pressure you are creating in the tubing until you reach the bulb.  The whole tube will be flat.   If at any time it feels like the tube is pulling away from the skin or starts to hurt STOP! Then start over again more gently.   Strip the drain each time you empty it.

## 2021-08-31 NOTE — PATIENT INSTRUCTIONS
Preparing for Your Surgery      Name:  Silvia Fritz   MRN:  [id number]   :  1995   Today's Date:  2021         Arriving for surgery:  Surgery date:  9/15/21  Arrival time:  10:00 am    Restrictions due to COVID 19:  One consistent visitor is allowed per patient  No ill visitors  All visitors must wear face mask     parking is available for anyone with mobility limitations or disabilities. (Monday- Friday 7 am- 5 pm)    Please come to:    [facility]  [address] [place] [id number]    Please check in on the 5th floor at the [facility]       What can I eat or drink?    -  You may eat and drink normally until 8 hours before surgery. (Until 3:00 am)  -  You may have clear liquids up to 4 hours before surgery. (Until 7:00 am)    Examples of clear liquids:  Water  Clear broth  Juices (apple, white grape, white cranberry  and cider) without pulp  Noncarbonated, powder based beverages  (lemonade and Tamir-Aid)  Sodas (Sprite, 7-Up, ginger ale and seltzer)  Coffee or tea (without milk or cream)  Gatorade    --No alcohol for at least 24 hours before surgery    Which medicines can I take?    Hold Aspirin for 7 days before surgery.   Hold Multivitamins for 7 days before surgery.  Hold Supplements for 7 days before surgery.    **Hold Ibuprofen (Advil, Motrin) for 1 day before surgery--unless otherwise directed by surgeon.**    Hold Naproxen (Aleve) for 4 days before surgery.    -  PLEASE TAKE the following medications the day of surgery:  Acetaminophen (Tylenol)  Atomoxetine (Strattera)       How do I prepare myself?  - Please take 2 showers before surgery using Scrubcare or Hibiclens soap.    Use this soap only from the neck to your toes.     Leave the soap on your skin for one minute--then rinse thoroughly.      You may use your own shampoo and conditioner; no other hair products.   - Please remove all jewelry and body piercings.  - No lotions, deodorants  or fragrance.  - No makeup or fingernail polish.   - Bring your ID and insurance card.    -If you have a Deep Brain Stimulator, a Spinal Cord Stimulator or any implanted Neuro Device you must bring the remote to the Surgery Center         - All patients are required to have a Covid-19 test within 4 days of surgery/procedure.      -Patients will be contacted by the [facility] scheduling team within 1 week of surgery to make an appointment.      - Patients may call the Scheduling team at [phone] if they have not been scheduled within 4 days of  surgery.      ALL PATIENTS ARE REQUIRED TO HAVE A RESPONSIBLE ADULT TO DRIVE AND BE IN ATTENDANCE WITH THEM FOR 24 HOURS FOLLOWING SURGERY       Questions or Concerns:    -For questions regarding the day of surgery please contact the Ambulatory Surgery Center at [phone].    -If you have health changes between today and your surgery please contact your surgeon.     For questions after surgery please call your surgeons office.

## 2021-08-31 NOTE — NURSING NOTE
"Chief Complaint   Patient presents with     AMOR Colon, is being seen today for a pre-op DOS 9/15, feeling good.       Vitals:    08/31/21 1213   BP: 118/78   BP Location: Left arm   Patient Position: Chair   Cuff Size: Adult Regular   Pulse: 65   Temp: 98.2  F (36.8  C)   TempSrc: Oral   SpO2: 97%   Weight: 47.6 kg (105 lb)   Height: 1.626 m (5' 4\")       Body mass index is 18.02 kg/m .      Palimra Anderson LPN    "

## 2021-08-31 NOTE — H&P (VIEW-ONLY)
Pre-Operative H & P     CC:  Preoperative exam to assess for increased cardiopulmonary risk while undergoing surgery and anesthesia.    Date of Encounter: 8/31/2021  Primary Care Physician:  Margie Murray  Reason for visit: Gender dysphoria in adolescent and adult [F64.0]    HPI  FREDDY Fritz is a 26 year old adult who presents for pre-operative H & P in preparation for bilateral simple mastectomy, possible nipple grafts with Dr. Warren on 9/15/21 at [facility]. History is obtained from the patient and medical records.     Patient who is biological female transitioning to male with a history of gender dysphoria and autism spectrum disorder who consulted with Dr. Warren in April in consideration for TOP surgery. His pronouns are he/him and his preferred name is Freddy. He has been on testosterone for 8 months, administered by [facility]. He has been living his chosen gender identity/role for a few years. He binds with GC2B.  A screening mammogram was negative on 5/13/21. He was counseled for above procedure    Today patient denies fever, cough, shortness of breath, chest pain, irregular HR, or ankle edema.       Past Medical History  Past Medical History:   Diagnosis Date     ADHD (attention deficit hyperactivity disorder)      Anxiety      Depressive disorder      Gender dysphoria        Past Surgical History  Past Surgical History:   Procedure Laterality Date     Surgery of urinary tract      as toddler       Hx of Blood transfusions/reactions: Denies.      Hx of abnormal bleeding or anti-platelet use: Denies.     Personal or FH with difficulty with Anesthesia:  Denies.     Prior to Admission Medications  Current Outpatient Medications   Medication Sig Dispense Refill     acetaminophen (TYLENOL) 325 MG tablet Take 325-650 mg by mouth every 6 hours as needed for mild pain       atomoxetine (STRATTERA) 10 MG capsule Take 1 capsule (10 mg) by mouth daily (Patient taking differently: Take 10  "mg by mouth every morning ) 90 capsule 0     ibuprofen (ADVIL/MOTRIN) 100 MG tablet Take 100 mg by mouth every 4 hours as needed       testosterone cypionate (DEPOTESTOSTERONE) 200 MG/ML injection Inject 0.3 mLs (60 mg) Subcutaneous once a week 4 mL 3     needle, disp, 18G X 1\" MISC Use for weekly testosterone injection 25 each 11     Needle, Disp, 25G X 5/8\" MISC Use to inject testosterone weekly. 25 each 11     syringe, disposable, (B-D SYRINGE LUER-TOMY) 1 ML MISC To use with weekly injection 25 each 11       Allergies  Allergies   Allergen Reactions     Dairy Products [Milk Protein Extract]      Egg [Chicken-Derived Products (Egg)]        Social History  Social History     Socioeconomic History     Marital status: Single     Spouse name: Not on file     Number of children: Not on file     Years of education: Not on file     Highest education level: Not on file   Occupational History     Not on file   Tobacco Use     Smoking status: Never Smoker     Smokeless tobacco: Never Used   Substance and Sexual Activity     Alcohol use: Never     Drug use: Never     Sexual activity: Never     Birth control/protection: None   Other Topics Concern     Not on file   Social History Narrative     Not on file     Social Determinants of Health     Financial Resource Strain:      Difficulty of Paying Living Expenses:    Food Insecurity:      Worried About Running Out of Food in the Last Year:      Ran Out of Food in the Last Year:    Transportation Needs:      Lack of Transportation (Medical):      Lack of Transportation (Non-Medical):    Physical Activity:      Days of Exercise per Week:      Minutes of Exercise per Session:    Stress:      Feeling of Stress :    Social Connections:      Frequency of Communication with Friends and Family:      Frequency of Social Gatherings with Friends and Family:      Attends L[employer] Services:      Active Member of Clubs or Organizations:      Attends Club or Organization Meetings:      " Marital Status:    Intimate Partner Violence:      Fear of Current or Ex-Partner:      Emotionally Abused:      Physically Abused:      Sexually Abused:        Family History  Family History   Problem Relation Age of Onset     Obesity Mother      Depression Mother      Obesity Father      Cancer No family hx of      Diabetes No family hx of      Hypertension No family hx of      ROS/MED HISTORY  The complete review of systems is negative other than noted in the HPI or here.    ENT/Pulmonary:  - neg pulmonary ROS     Neurologic: Comment: ADHD  Aspergers      Cardiovascular:     (+) -----No previous cardiac testing  (-) taking anticoagulants/antiplatelets   METS/Exercise Tolerance: >4 METS    Hematologic:  - neg hematologic  ROS     Musculoskeletal:  - neg musculoskeletal ROS     GI/Hepatic:  - neg GI/hepatic ROS     Renal/Genitourinary:  - neg Renal ROS     Endo:  - neg endo ROS     Psychiatric/Substance Use:     (+) psychiatric history anxiety and other (comment) (Gender dysphoria)     Infectious Disease:  - neg infectious disease ROS     Malignancy:  - neg malignancy ROS     Other:  - neg other ROS        LABS: Personally reviewed  CBC:   Lab Results   Component Value Date    HGB 14.4 05/11/2021    HGB 14.1 02/02/2021    HCT 47.5 (H) 02/02/2021    HCT 42.6 07/21/2020     BMP:   Lab Results   Component Value Date    .2 02/02/2021    .5 11/03/2020    POTASSIUM 3.8 02/02/2021    POTASSIUM 4.4 11/03/2020    CHLORIDE 105.7 02/02/2021    CHLORIDE 98.6 11/03/2020    CO2 23.2 02/02/2021    CO2 28.0 11/03/2020    BUN 10.4 02/02/2021    BUN 4.9 (L) 11/03/2020    CR 0.8 02/02/2021    CR 0.8 11/03/2020    GLC 90.5 02/02/2021    GLC 94.9 11/03/2020     COAGS: No results found for: PTT, INR, FIBR  POC: No results found for: BGM, HCG, HCGS  HEPATIC:   Lab Results   Component Value Date    ALBUMIN 3.8 05/11/2021    PROTTOTAL 7.0 05/11/2021    ALT 21 05/11/2021    AST 18 05/11/2021    ALKPHOS 111 05/11/2021    BILITOTAL  "0.8 05/11/2021     OTHER:   Lab Results   Component Value Date    RANJEET 9.3 02/02/2021       Temp: 98  F (36.7  C) Temp src: Oral BP: 110/73 Pulse: 68   Resp: 12 SpO2: 98 %         109 lbs 0 oz  5' 4\"[pt reported[   Body mass index is 18.71 kg/m .       Physical Exam  Constitutional: Awake, alert, cooperative, no apparent distress, and appears stated age. Thin.  Eyes: Pupils equal, round and reactive to light, extra ocular muscles intact, sclera clear, conjunctiva normal.  HENT: Normocephalic, oral pharynx with moist mucus membranes, good dentition. No goiter appreciated.   Respiratory: Clear to auscultation bilaterally, no crackles or wheezing. No cough or obvious dyspnea.  Cardiovascular: Regular rate and rhythm, normal S1 and S2, and no murmur noted.  Carotids +2, no bruits. No edema. Palpable pulses to radial  DP and PT arteries.   GI: Normal bowel sounds, soft, non-distended, non-tender, no masses palpated, no hepatosplenomegaly.    Lymph/Hematologic: No cervical lymphadenopathy and no supraclavicular lymphadenopathy.  Genitourinary: Deferred.   Skin: Warm and dry.    Musculoskeletal: Full ROM of neck. There is no redness, warmth, or swelling of the joints. Gross motor strength is normal.    Neurologic: Awake, alert, oriented to name, place and time. Cranial nerves II-XII are grossly intact. Gait is normal.   Neuropsychiatric: Calm, cooperative. Normal affect.     EKG: Not indicated   Mammogram 5/13/21    COMMENTS: No suspicious finding.     IMPRESSION: BI-RADS CATEGORY: 1 -  NEGATIVE.    Imaging reviewed by this provider      Outside records reviewed from: Care Everywhere    ASSESSMENT and PLAN  FREDDY Fritz is a 26 year old adult scheduled to undergo bilateral simple mastectomy, possible nipple grafts with Dr. Warren on 9/15/21. He has the following specific operative considerations:   - RCRI : No serious cardiac risks.   - Anesthesia considerations:  Refer to PAC assessment in anesthesia records  - VTE " risk: 0.26-0.5%  - CAROL # of risks 0-1/8 = Low risk  - Risk of PONV score = 2-3.  If > 2, anti-emetic intervention recommended. If 3 or > anti emetic intervention recommended with two or more meds    --Gender dysphoria with above procedure now planned.   --No history of problems with anesthesia.  --No cardiac history, symptoms or meds. Good exercise tolerance.   --Nonsmoker. Denies pulmonary symptoms.   --Anxiety. No meds at this time. Some skin picking.   --ADHD Strattera daily but will hold on DOS.   --Pain management. Discussed possibility of nerve block if appropriate for patient's procedure. Dr. Warren requesting OnQ. Final counseling and decisions by regional team on DOS.  --Difficult IV stick.      Arrival time, NPO, shower and medication instructions provided by nursing staff today.    Patient is optimized and is acceptable candidate for the proposed procedure.  No further diagnostic evaluation is needed.         ANA Crowder CNS  Preoperative Assessment Center  [facility]  Phone: [phone]  Fax: [phone]

## 2021-08-31 NOTE — H&P
Pre-Operative H & P     CC:  Preoperative exam to assess for increased cardiopulmonary risk while undergoing surgery and anesthesia.    Date of Encounter: 8/31/2021  Primary Care Physician:  Margie Murray  Reason for visit: Gender dysphoria in adolescent and adult [F64.0]    HPI  FREDDY Fritz is a 26 year old adult who presents for pre-operative H & P in preparation for bilateral simple mastectomy, possible nipple grafts with Dr. Warren on 9/15/21 at [facility]. History is obtained from the patient and medical records.     Patient who is biological female transitioning to male with a history of gender dysphoria and autism spectrum disorder who consulted with Dr. Warren in April in consideration for TOP surgery. His pronouns are he/him and his preferred name is Freddy. He has been on testosterone for 8 months, administered by [facility]. He has been living his chosen gender identity/role for a few years. He binds with GC2B.  A screening mammogram was negative on 5/13/21. He was counseled for above procedure    Today patient denies fever, cough, shortness of breath, chest pain, irregular HR, or ankle edema.       Past Medical History  Past Medical History:   Diagnosis Date     ADHD (attention deficit hyperactivity disorder)      Anxiety      Depressive disorder      Gender dysphoria        Past Surgical History  Past Surgical History:   Procedure Laterality Date     Surgery of urinary tract      as toddler       Hx of Blood transfusions/reactions: Denies.      Hx of abnormal bleeding or anti-platelet use: Denies.     Personal or FH with difficulty with Anesthesia:  Denies.     Prior to Admission Medications  Current Outpatient Medications   Medication Sig Dispense Refill     acetaminophen (TYLENOL) 325 MG tablet Take 325-650 mg by mouth every 6 hours as needed for mild pain       atomoxetine (STRATTERA) 10 MG capsule Take 1 capsule (10 mg) by mouth daily (Patient taking differently: Take 10  "mg by mouth every morning ) 90 capsule 0     ibuprofen (ADVIL/MOTRIN) 100 MG tablet Take 100 mg by mouth every 4 hours as needed       testosterone cypionate (DEPOTESTOSTERONE) 200 MG/ML injection Inject 0.3 mLs (60 mg) Subcutaneous once a week 4 mL 3     needle, disp, 18G X 1\" MISC Use for weekly testosterone injection 25 each 11     Needle, Disp, 25G X 5/8\" MISC Use to inject testosterone weekly. 25 each 11     syringe, disposable, (B-D SYRINGE LUER-TOMY) 1 ML MISC To use with weekly injection 25 each 11       Allergies  Allergies   Allergen Reactions     Dairy Products [Milk Protein Extract]      Egg [Chicken-Derived Products (Egg)]        Social History  Social History     Socioeconomic History     Marital status: Single     Spouse name: Not on file     Number of children: Not on file     Years of education: Not on file     Highest education level: Not on file   Occupational History     Not on file   Tobacco Use     Smoking status: Never Smoker     Smokeless tobacco: Never Used   Substance and Sexual Activity     Alcohol use: Never     Drug use: Never     Sexual activity: Never     Birth control/protection: None   Other Topics Concern     Not on file   Social History Narrative     Not on file     Social Determinants of Health     Financial Resource Strain:      Difficulty of Paying Living Expenses:    Food Insecurity:      Worried About Running Out of Food in the Last Year:      Ran Out of Food in the Last Year:    Transportation Needs:      Lack of Transportation (Medical):      Lack of Transportation (Non-Medical):    Physical Activity:      Days of Exercise per Week:      Minutes of Exercise per Session:    Stress:      Feeling of Stress :    Social Connections:      Frequency of Communication with Friends and Family:      Frequency of Social Gatherings with Friends and Family:      Attends Taoism Services:      Active Member of Clubs or Organizations:      Attends Club or Organization Meetings:      " Marital Status:    Intimate Partner Violence:      Fear of Current or Ex-Partner:      Emotionally Abused:      Physically Abused:      Sexually Abused:        Family History  Family History   Problem Relation Age of Onset     Obesity Mother      Depression Mother      Obesity Father      Cancer No family hx of      Diabetes No family hx of      Hypertension No family hx of      ROS/MED HISTORY  The complete review of systems is negative other than noted in the HPI or here.    ENT/Pulmonary:  - neg pulmonary ROS     Neurologic: Comment: ADHD  Aspergers      Cardiovascular:     (+) -----No previous cardiac testing  (-) taking anticoagulants/antiplatelets   METS/Exercise Tolerance: >4 METS    Hematologic:  - neg hematologic  ROS     Musculoskeletal:  - neg musculoskeletal ROS     GI/Hepatic:  - neg GI/hepatic ROS     Renal/Genitourinary:  - neg Renal ROS     Endo:  - neg endo ROS     Psychiatric/Substance Use:     (+) psychiatric history anxiety and other (comment) (Gender dysphoria)     Infectious Disease:  - neg infectious disease ROS     Malignancy:  - neg malignancy ROS     Other:  - neg other ROS        LABS: Personally reviewed  CBC:   Lab Results   Component Value Date    HGB 14.4 05/11/2021    HGB 14.1 02/02/2021    HCT 47.5 (H) 02/02/2021    HCT 42.6 07/21/2020     BMP:   Lab Results   Component Value Date    .2 02/02/2021    .5 11/03/2020    POTASSIUM 3.8 02/02/2021    POTASSIUM 4.4 11/03/2020    CHLORIDE 105.7 02/02/2021    CHLORIDE 98.6 11/03/2020    CO2 23.2 02/02/2021    CO2 28.0 11/03/2020    BUN 10.4 02/02/2021    BUN 4.9 (L) 11/03/2020    CR 0.8 02/02/2021    CR 0.8 11/03/2020    GLC 90.5 02/02/2021    GLC 94.9 11/03/2020     COAGS: No results found for: PTT, INR, FIBR  POC: No results found for: BGM, HCG, HCGS  HEPATIC:   Lab Results   Component Value Date    ALBUMIN 3.8 05/11/2021    PROTTOTAL 7.0 05/11/2021    ALT 21 05/11/2021    AST 18 05/11/2021    ALKPHOS 111 05/11/2021    BILITOTAL  "0.8 05/11/2021     OTHER:   Lab Results   Component Value Date    RANJEET 9.3 02/02/2021       Temp: 98  F (36.7  C) Temp src: Oral BP: 110/73 Pulse: 68   Resp: 12 SpO2: 98 %         109 lbs 0 oz  5' 4\"[pt reported[   Body mass index is 18.71 kg/m .       Physical Exam  Constitutional: Awake, alert, cooperative, no apparent distress, and appears stated age. Thin.  Eyes: Pupils equal, round and reactive to light, extra ocular muscles intact, sclera clear, conjunctiva normal.  HENT: Normocephalic, oral pharynx with moist mucus membranes, good dentition. No goiter appreciated.   Respiratory: Clear to auscultation bilaterally, no crackles or wheezing. No cough or obvious dyspnea.  Cardiovascular: Regular rate and rhythm, normal S1 and S2, and no murmur noted.  Carotids +2, no bruits. No edema. Palpable pulses to radial  DP and PT arteries.   GI: Normal bowel sounds, soft, non-distended, non-tender, no masses palpated, no hepatosplenomegaly.    Lymph/Hematologic: No cervical lymphadenopathy and no supraclavicular lymphadenopathy.  Genitourinary: Deferred.   Skin: Warm and dry.    Musculoskeletal: Full ROM of neck. There is no redness, warmth, or swelling of the joints. Gross motor strength is normal.    Neurologic: Awake, alert, oriented to name, place and time. Cranial nerves II-XII are grossly intact. Gait is normal.   Neuropsychiatric: Calm, cooperative. Normal affect.     EKG: Not indicated   Mammogram 5/13/21    COMMENTS: No suspicious finding.     IMPRESSION: BI-RADS CATEGORY: 1 -  NEGATIVE.    Imaging reviewed by this provider      Outside records reviewed from: Care Everywhere    ASSESSMENT and PLAN  FREDDY Fritz is a 26 year old adult scheduled to undergo bilateral simple mastectomy, possible nipple grafts with Dr. Warren on 9/15/21. He has the following specific operative considerations:   - RCRI : No serious cardiac risks.   - Anesthesia considerations:  Refer to PAC assessment in anesthesia records  - VTE " risk: 0.26-0.5%  - CAROL # of risks 0-1/8 = Low risk  - Risk of PONV score = 2-3.  If > 2, anti-emetic intervention recommended. If 3 or > anti emetic intervention recommended with two or more meds    --Gender dysphoria with above procedure now planned.   --No history of problems with anesthesia.  --No cardiac history, symptoms or meds. Good exercise tolerance.   --Nonsmoker. Denies pulmonary symptoms.   --Anxiety. No meds at this time. Some skin picking.   --ADHD Strattera daily but will hold on DOS.   --Pain management. Discussed possibility of nerve block if appropriate for patient's procedure. Dr. Warren requesting OnQ. Final counseling and decisions by regional team on DOS.  --Difficult IV stick.      Arrival time, NPO, shower and medication instructions provided by nursing staff today.    Patient is optimized and is acceptable candidate for the proposed procedure.  No further diagnostic evaluation is needed.         ANA Crowder CNS  Preoperative Assessment Center  [facility]  Phone: [phone]  Fax: [phone]

## 2021-09-03 ENCOUNTER — LAB (OUTPATIENT)
Dept: LAB | Facility: CLINIC | Age: 26
End: 2021-09-03
Payer: COMMERCIAL

## 2021-09-03 DIAGNOSIS — F64.9 GENDER DYSPHORIA: ICD-10-CM

## 2021-09-03 LAB
CHOLEST SERPL-MCNC: 142 MG/DL
FASTING STATUS PATIENT QL REPORTED: YES
FASTING STATUS PATIENT QL REPORTED: YES
GLUCOSE BLD-MCNC: 78 MG/DL (ref 70–99)
HDLC SERPL-MCNC: 41 MG/DL
LDLC SERPL CALC-MCNC: 87 MG/DL
NONHDLC SERPL-MCNC: 101 MG/DL
TESTOST SERPL-MCNC: 869 NG/DL (ref 8–950)
TRIGL SERPL-MCNC: 68 MG/DL

## 2021-09-03 PROCEDURE — 82947 ASSAY GLUCOSE BLOOD QUANT: CPT

## 2021-09-03 PROCEDURE — 36415 COLL VENOUS BLD VENIPUNCTURE: CPT

## 2021-09-03 PROCEDURE — 80061 LIPID PANEL: CPT

## 2021-09-09 RX ORDER — CEFAZOLIN SODIUM 2 G/50ML
2 SOLUTION INTRAVENOUS
Status: CANCELLED | OUTPATIENT
Start: 2021-09-09

## 2021-09-09 RX ORDER — CEFAZOLIN SODIUM 2 G/50ML
2 SOLUTION INTRAVENOUS SEE ADMIN INSTRUCTIONS
Status: CANCELLED | OUTPATIENT
Start: 2021-09-09

## 2021-09-13 ENCOUNTER — LAB (OUTPATIENT)
Dept: LAB | Facility: CLINIC | Age: 26
End: 2021-09-13
Payer: COMMERCIAL

## 2021-09-13 DIAGNOSIS — Z11.59 ENCOUNTER FOR SCREENING FOR OTHER VIRAL DISEASES: ICD-10-CM

## 2021-09-13 LAB — SARS-COV-2 RNA RESP QL NAA+PROBE: NEGATIVE

## 2021-09-13 PROCEDURE — U0005 INFEC AGEN DETEC AMPLI PROBE: HCPCS

## 2021-09-13 PROCEDURE — U0003 INFECTIOUS AGENT DETECTION BY NUCLEIC ACID (DNA OR RNA); SEVERE ACUTE RESPIRATORY SYNDROME CORONAVIRUS 2 (SARS-COV-2) (CORONAVIRUS DISEASE [COVID-19]), AMPLIFIED PROBE TECHNIQUE, MAKING USE OF HIGH THROUGHPUT TECHNOLOGIES AS DESCRIBED BY CMS-2020-01-R: HCPCS

## 2021-09-14 ENCOUNTER — ANESTHESIA EVENT (OUTPATIENT)
Dept: SURGERY | Facility: AMBULATORY SURGERY CENTER | Age: 26
End: 2021-09-14
Payer: COMMERCIAL

## 2021-09-15 ENCOUNTER — HOSPITAL ENCOUNTER (OUTPATIENT)
Facility: AMBULATORY SURGERY CENTER | Age: 26
End: 2021-09-15
Attending: SURGERY
Payer: COMMERCIAL

## 2021-09-15 ENCOUNTER — ANESTHESIA (OUTPATIENT)
Dept: SURGERY | Facility: AMBULATORY SURGERY CENTER | Age: 26
End: 2021-09-15
Payer: COMMERCIAL

## 2021-09-15 VITALS
BODY MASS INDEX: 18.61 KG/M2 | HEART RATE: 66 BPM | OXYGEN SATURATION: 96 % | HEIGHT: 64 IN | TEMPERATURE: 97.6 F | DIASTOLIC BLOOD PRESSURE: 70 MMHG | SYSTOLIC BLOOD PRESSURE: 111 MMHG | RESPIRATION RATE: 14 BRPM | WEIGHT: 109 LBS

## 2021-09-15 DIAGNOSIS — F64.0 GENDER DYSPHORIA IN ADOLESCENT AND ADULT: Primary | ICD-10-CM

## 2021-09-15 LAB
HCG UR QL: NEGATIVE
INTERNAL QC OK POCT: NORMAL

## 2021-09-15 PROCEDURE — 88305 TISSUE EXAM BY PATHOLOGIST: CPT | Mod: TC | Performed by: SURGERY

## 2021-09-15 PROCEDURE — 19350 NIPPLE/AREOLA RECONSTRUCTION: CPT | Mod: 50

## 2021-09-15 PROCEDURE — 19303 MAST SIMPLE COMPLETE: CPT | Mod: 50 | Performed by: SURGERY

## 2021-09-15 PROCEDURE — 88305 TISSUE EXAM BY PATHOLOGIST: CPT | Mod: 26 | Performed by: PATHOLOGY

## 2021-09-15 PROCEDURE — 81025 URINE PREGNANCY TEST: CPT | Performed by: PATHOLOGY

## 2021-09-15 PROCEDURE — 19303 MAST SIMPLE COMPLETE: CPT | Mod: 50

## 2021-09-15 PROCEDURE — 19350 NIPPLE/AREOLA RECONSTRUCTION: CPT | Mod: 50 | Performed by: SURGERY

## 2021-09-15 RX ORDER — OXYCODONE HYDROCHLORIDE 5 MG/1
5 TABLET ORAL EVERY 8 HOURS PRN
Qty: 15 TABLET | Refills: 0 | Status: SHIPPED | OUTPATIENT
Start: 2021-09-15 | End: 2021-09-18

## 2021-09-15 RX ORDER — FENTANYL CITRATE 50 UG/ML
25 INJECTION, SOLUTION INTRAMUSCULAR; INTRAVENOUS EVERY 5 MIN PRN
Status: DISCONTINUED | OUTPATIENT
Start: 2021-09-15 | End: 2021-09-15 | Stop reason: HOSPADM

## 2021-09-15 RX ORDER — AZITHROMYCIN 250 MG/1
TABLET, FILM COATED ORAL
Qty: 6 TABLET | Refills: 0 | Status: SHIPPED | OUTPATIENT
Start: 2021-09-15 | End: 2022-01-17

## 2021-09-15 RX ORDER — LIDOCAINE 40 MG/G
CREAM TOPICAL
Status: DISCONTINUED | OUTPATIENT
Start: 2021-09-15 | End: 2021-09-15 | Stop reason: HOSPADM

## 2021-09-15 RX ORDER — PROPOFOL 10 MG/ML
INJECTION, EMULSION INTRAVENOUS PRN
Status: DISCONTINUED | OUTPATIENT
Start: 2021-09-15 | End: 2021-09-15

## 2021-09-15 RX ORDER — LIDOCAINE HYDROCHLORIDE 20 MG/ML
INJECTION, SOLUTION INFILTRATION; PERINEURAL PRN
Status: DISCONTINUED | OUTPATIENT
Start: 2021-09-15 | End: 2021-09-15

## 2021-09-15 RX ORDER — OXYCODONE HYDROCHLORIDE 5 MG/1
5 TABLET ORAL EVERY 4 HOURS PRN
Status: DISCONTINUED | OUTPATIENT
Start: 2021-09-15 | End: 2021-09-16 | Stop reason: HOSPADM

## 2021-09-15 RX ORDER — SODIUM CHLORIDE, SODIUM LACTATE, POTASSIUM CHLORIDE, CALCIUM CHLORIDE 600; 310; 30; 20 MG/100ML; MG/100ML; MG/100ML; MG/100ML
INJECTION, SOLUTION INTRAVENOUS CONTINUOUS
Status: DISCONTINUED | OUTPATIENT
Start: 2021-09-15 | End: 2021-09-16 | Stop reason: HOSPADM

## 2021-09-15 RX ORDER — ACETAMINOPHEN 325 MG/1
975 TABLET ORAL ONCE
Status: COMPLETED | OUTPATIENT
Start: 2021-09-15 | End: 2021-09-15

## 2021-09-15 RX ORDER — FENTANYL CITRATE 50 UG/ML
INJECTION, SOLUTION INTRAMUSCULAR; INTRAVENOUS PRN
Status: DISCONTINUED | OUTPATIENT
Start: 2021-09-15 | End: 2021-09-15

## 2021-09-15 RX ORDER — PROPOFOL 10 MG/ML
INJECTION, EMULSION INTRAVENOUS CONTINUOUS PRN
Status: DISCONTINUED | OUTPATIENT
Start: 2021-09-15 | End: 2021-09-15

## 2021-09-15 RX ORDER — CEFAZOLIN SODIUM 2 G/50ML
2 SOLUTION INTRAVENOUS
Status: COMPLETED | OUTPATIENT
Start: 2021-09-15 | End: 2021-09-15

## 2021-09-15 RX ORDER — ONDANSETRON 2 MG/ML
INJECTION INTRAMUSCULAR; INTRAVENOUS PRN
Status: DISCONTINUED | OUTPATIENT
Start: 2021-09-15 | End: 2021-09-15

## 2021-09-15 RX ORDER — HYDROXYZINE HYDROCHLORIDE 25 MG/1
25-50 TABLET, FILM COATED ORAL EVERY 6 HOURS PRN
Qty: 20 TABLET | Refills: 1 | Status: SHIPPED | OUTPATIENT
Start: 2021-09-15 | End: 2021-09-21

## 2021-09-15 RX ORDER — ONDANSETRON 4 MG/1
4 TABLET, ORALLY DISINTEGRATING ORAL EVERY 30 MIN PRN
Status: DISCONTINUED | OUTPATIENT
Start: 2021-09-15 | End: 2021-09-16 | Stop reason: HOSPADM

## 2021-09-15 RX ORDER — HYDROMORPHONE HYDROCHLORIDE 1 MG/ML
0.2 INJECTION, SOLUTION INTRAMUSCULAR; INTRAVENOUS; SUBCUTANEOUS EVERY 5 MIN PRN
Status: DISCONTINUED | OUTPATIENT
Start: 2021-09-15 | End: 2021-09-15 | Stop reason: HOSPADM

## 2021-09-15 RX ORDER — DEXAMETHASONE SODIUM PHOSPHATE 4 MG/ML
INJECTION, SOLUTION INTRA-ARTICULAR; INTRALESIONAL; INTRAMUSCULAR; INTRAVENOUS; SOFT TISSUE PRN
Status: DISCONTINUED | OUTPATIENT
Start: 2021-09-15 | End: 2021-09-15

## 2021-09-15 RX ORDER — ONDANSETRON 4 MG/1
4-8 TABLET, ORALLY DISINTEGRATING ORAL EVERY 8 HOURS PRN
Qty: 20 TABLET | Refills: 1 | Status: SHIPPED | OUTPATIENT
Start: 2021-09-15 | End: 2021-09-21

## 2021-09-15 RX ORDER — CEFAZOLIN SODIUM 2 G/50ML
2 SOLUTION INTRAVENOUS SEE ADMIN INSTRUCTIONS
Status: DISCONTINUED | OUTPATIENT
Start: 2021-09-15 | End: 2021-09-15 | Stop reason: HOSPADM

## 2021-09-15 RX ORDER — SODIUM CHLORIDE, SODIUM LACTATE, POTASSIUM CHLORIDE, CALCIUM CHLORIDE 600; 310; 30; 20 MG/100ML; MG/100ML; MG/100ML; MG/100ML
INJECTION, SOLUTION INTRAVENOUS CONTINUOUS
Status: DISCONTINUED | OUTPATIENT
Start: 2021-09-15 | End: 2021-09-15 | Stop reason: HOSPADM

## 2021-09-15 RX ORDER — BUPIVACAINE HYDROCHLORIDE 2.5 MG/ML
INJECTION, SOLUTION INFILTRATION; PERINEURAL PRN
Status: DISCONTINUED | OUTPATIENT
Start: 2021-09-15 | End: 2021-09-15 | Stop reason: HOSPADM

## 2021-09-15 RX ORDER — ONDANSETRON 2 MG/ML
4 INJECTION INTRAMUSCULAR; INTRAVENOUS EVERY 30 MIN PRN
Status: DISCONTINUED | OUTPATIENT
Start: 2021-09-15 | End: 2021-09-16 | Stop reason: HOSPADM

## 2021-09-15 RX ADMIN — LIDOCAINE HYDROCHLORIDE 60 MG: 20 INJECTION, SOLUTION INFILTRATION; PERINEURAL at 11:36

## 2021-09-15 RX ADMIN — FENTANYL CITRATE 50 MCG: 50 INJECTION, SOLUTION INTRAMUSCULAR; INTRAVENOUS at 11:59

## 2021-09-15 RX ADMIN — PROPOFOL 50 MCG/KG/MIN: 10 INJECTION, EMULSION INTRAVENOUS at 11:36

## 2021-09-15 RX ADMIN — PROPOFOL 150 MG: 10 INJECTION, EMULSION INTRAVENOUS at 11:36

## 2021-09-15 RX ADMIN — ONDANSETRON 4 MG: 2 INJECTION INTRAMUSCULAR; INTRAVENOUS at 13:37

## 2021-09-15 RX ADMIN — CEFAZOLIN SODIUM 2 G: 2 SOLUTION INTRAVENOUS at 11:30

## 2021-09-15 RX ADMIN — SODIUM CHLORIDE, SODIUM LACTATE, POTASSIUM CHLORIDE, CALCIUM CHLORIDE: 600; 310; 30; 20 INJECTION, SOLUTION INTRAVENOUS at 10:00

## 2021-09-15 RX ADMIN — ONDANSETRON 4 MG: 2 INJECTION INTRAMUSCULAR; INTRAVENOUS at 16:23

## 2021-09-15 RX ADMIN — DEXAMETHASONE SODIUM PHOSPHATE 4 MG: 4 INJECTION, SOLUTION INTRA-ARTICULAR; INTRALESIONAL; INTRAMUSCULAR; INTRAVENOUS; SOFT TISSUE at 11:36

## 2021-09-15 RX ADMIN — FENTANYL CITRATE 50 MCG: 50 INJECTION, SOLUTION INTRAMUSCULAR; INTRAVENOUS at 12:27

## 2021-09-15 RX ADMIN — ACETAMINOPHEN 975 MG: 325 TABLET ORAL at 10:22

## 2021-09-15 RX ADMIN — Medication 0.5 MG: at 13:13

## 2021-09-15 ASSESSMENT — MIFFLIN-ST. JEOR: SCORE: 1219.42

## 2021-09-15 NOTE — ANESTHESIA POSTPROCEDURE EVALUATION
Patient: Silvia Fritz    Procedure(s):  bilateral simple mastectomy, possible nipple grafts. OnQ    Diagnosis:Gender dysphoria in adolescent and adult [F64.0]  Diagnosis Additional Information: No value filed.    Anesthesia Type:  General    Note:  Disposition: Outpatient   Postop Pain Control: Uneventful            Sign Out: Well controlled pain   PONV: No   Neuro/Psych: Uneventful            Sign Out: Acceptable/Baseline neuro status   Airway/Respiratory: Uneventful            Sign Out: Acceptable/Baseline resp. status   CV/Hemodynamics: Uneventful            Sign Out: Acceptable CV status; No obvious hypovolemia; No obvious fluid overload   Other NRE: NONE   DID A NON-ROUTINE EVENT OCCUR? No           Last vitals:  Vitals Value Taken Time   /73 09/15/21 1615   Temp 36.6  C (97.9  F) 09/15/21 1440   Pulse 67 09/15/21 1617   Resp 0 09/15/21 1617   SpO2 97 % 09/15/21 1617   Vitals shown include unvalidated device data.    Electronically Signed By: Sukh Casillas MD  September 15, 2021  4:18 PM

## 2021-09-15 NOTE — ANESTHESIA PREPROCEDURE EVALUATION
Anesthesia Pre-Procedure Evaluation    Patient: Silvia Fritz   MRN: [id number] : 1995        Preoperative Diagnosis: Gender dysphoria in adolescent and adult [F64.0]   Procedure : Procedure(s):  bilateral simple mastectomy, possible nipple grafts. OnQ     Past Medical History:   Diagnosis Date     ADHD (attention deficit hyperactivity disorder)      Anxiety      Depressive disorder      Gender dysphoria       Past Surgical History:   Procedure Laterality Date     Surgery of urinary tract      as toddler      Allergies   Allergen Reactions     Dairy Products [Milk Protein Extract]      Egg [Chicken-Derived Products (Egg)]       Social History     Tobacco Use     Smoking status: Never Smoker     Smokeless tobacco: Never Used   Substance Use Topics     Alcohol use: Never      Wt Readings from Last 1 Encounters:   09/15/21 49.4 kg (109 lb)        Anesthesia Evaluation   Pt has had prior anesthetic. Type: General.    No history of anesthetic complications       ROS/MED HX  ENT/Pulmonary:  - neg pulmonary ROS     Neurologic: Comment: ADHD  Aspergers      Cardiovascular:     (+) -----No previous cardiac testing  (-) taking anticoagulants/antiplatelets   METS/Exercise Tolerance: >4 METS    Hematologic:  - neg hematologic  ROS     Musculoskeletal:  - neg musculoskeletal ROS     GI/Hepatic:  - neg GI/hepatic ROS     Renal/Genitourinary:  - neg Renal ROS     Endo:  - neg endo ROS     Psychiatric/Substance Use:     (+) psychiatric history anxiety and other (comment) (Gender dysphoria)     Infectious Disease:  - neg infectious disease ROS     Malignancy:  - neg malignancy ROS     Other:  - neg other ROS          Physical Exam    Airway        Mallampati: II   TM distance: > 3 FB      Respiratory Devices and Support         Dental           Cardiovascular          Rhythm and rate: regular and normal     Pulmonary           breath sounds clear to auscultation           OUTSIDE LABS:  CBC:   Lab Results   Component Value  Date    HGB 15.3 08/31/2021    HGB 14.4 05/11/2021    HCT 47.5 (H) 02/02/2021    HCT 42.6 07/21/2020     BMP:   Lab Results   Component Value Date    .2 02/02/2021    .5 11/03/2020    POTASSIUM 3.8 02/02/2021    POTASSIUM 4.4 11/03/2020    CHLORIDE 105.7 02/02/2021    CHLORIDE 98.6 11/03/2020    CO2 23.2 02/02/2021    CO2 28.0 11/03/2020    BUN 10.4 02/02/2021    BUN 4.9 (L) 11/03/2020    CR 0.8 02/02/2021    CR 0.8 11/03/2020    GLC 78 09/03/2021    GLC 90.5 02/02/2021     COAGS: No results found for: PTT, INR, FIBR  POC:   Lab Results   Component Value Date    HCG Negative 09/15/2021     HEPATIC:   Lab Results   Component Value Date    ALBUMIN 4.0 08/31/2021    PROTTOTAL 7.6 08/31/2021    ALT 16 08/31/2021    AST 10 08/31/2021    ALKPHOS 114 08/31/2021    BILITOTAL 0.9 08/31/2021     OTHER:   Lab Results   Component Value Date    RANJEET 9.3 02/02/2021       Anesthesia Plan    ASA Status:  2      Anesthesia Type: General.     - Airway: LMA   Induction: Intravenous.   Maintenance: TIVA.        Consents    Anesthesia Plan(s) and associated risks, benefits, and realistic alternatives discussed. Questions answered and patient/representative(s) expressed understanding.     - Discussed with:  Patient         Postoperative Care    Pain management: Oral pain medications.   PONV prophylaxis: Ondansetron (or other 5HT-3), Dexamethasone or Solumedrol     Comments:                Sukh Casillas MD

## 2021-09-15 NOTE — ANESTHESIA CARE TRANSFER NOTE
Patient: Silvia Fritz    Procedure(s):  bilateral simple mastectomy, possible nipple grafts. OnQ    Diagnosis: Gender dysphoria in adolescent and adult [F64.0]  Diagnosis Additional Information: No value filed.    Anesthesia Type:   General     Note:    Oropharynx: spontaneously breathing  Level of Consciousness: drowsy  Oxygen Supplementation: nasal cannula  Level of Supplemental Oxygen (L/min / FiO2): 3  Independent Airway: airway patency satisfactory and stable  Dentition: dentition unchanged S/P dental procedure  Vital Signs Stable: post-procedure vital signs reviewed and stable  Report to RN Given: handoff report given  Patient transferred to: PACU    Handoff Report: Identifed the Patient, Identified the Reponsible Provider, Reviewed the pertinent medical history, Discussed the surgical course, Reviewed Intra-OP anesthesia mangement and issues during anesthesia, Set expectations for post-procedure period and Allowed opportunity for questions and acknowledgement of understanding      Vitals:  Vitals Value Taken Time   /90 09/15/21 1445   Temp 36.6  C (97.9  F) 09/15/21 1440   Pulse 53 09/15/21 1453   Resp 28 09/15/21 1453   SpO2 100 % 09/15/21 1453   Vitals shown include unvalidated device data.    Electronically Signed By: ANA Herrera CRNA  September 15, 2021  2:54 PM

## 2021-09-15 NOTE — BRIEF OP NOTE
[facility] [place]    Brief Operative Note    Pre-operative diagnosis: Gender dysphoria in adolescent and adult [F64.0]  Post-operative diagnosis Same as pre-operative diagnosis    Procedure: Procedure(s):  bilateral simple mastectomy, possible nipple grafts. OnQ  Surgeon: Surgeon(s) and Role:     * Ani Warren MD - Primary  Anesthesia: General   Estimated blood loss: 100 ml  Drains: Ike-Rivero  Specimens:   ID Type Source Tests Collected by Time Destination   1 : LEFT BREAST TISSUE Tissue Breast, Left SURGICAL PATHOLOGY EXAM Ani Warren MD 9/15/2021 12:30 PM    2 : RIGHT BREAST TISSUE Tissue Breast, Right SURGICAL PATHOLOGY EXAM Ani Warren MD 9/15/2021 12:30 PM      Findings:   None.  Complications: None.  Implants: * No implants in log *

## 2021-09-15 NOTE — DISCHARGE INSTRUCTIONS
"[facility]  Home Care Following Anesthesia  For 24 hours after surgery:  1. Get plenty of rest.  A responsible adult must stay with you for at least 24 hours after you leave the surgery center.  2. Do not drive or use heavy equipment.  If you have weakness or tingling, don't drive or use heavy equipment until this feeling goes away.   3. Do not drink alcohol.   4. Avoid strenuous or risky activities.  Ask for help when climbing stairs.  5. You may feel lightheaded.  IF so, sit for a few minutes before standing.  Have someone help you get up.   6. If you have nausea (feel sick to your stomach): Drink only clear liquids such as apple juice, ginger ale, broth or 7-Up.  Rest may also help.  Be sure to drink enough fluids.  Move to a regular diet as you feel able.   7. You may have a slight fever.  Call the doctor if your fever is over 100 F (37.7 C) (taken under the tongue) or lasts longer than 24 hours.  8. You may have a dry mouth, a sore throat, muscle aches or trouble sleeping. These should go away after 24 hours.  9. Do not make important or legal decisions.   10. It is recommended to avoid smoking.        Today you received a Marcaine or bupivacaine block to numb the nerves near your surgery site.  This is a block using local anesthetic or \"numbing\" medication injected around the nerves to anesthetize or \"numb\" the area supplied by those nerves.  This block is injected into the muscle layer near your surgical site.  The medication may numb the location where you had surgery for 6-18 hours, but may last up to 24 hours.  If your surgical site is an arm or leg you should be careful with your affected limb, since it is possible to injure your limb without being aware of it due to the numbing.  Until full feeling returns, you should guard against bumping or hitting your limb, and avoid extreme hot or cold temperatures on the skin.  As the block wears off, the feeling will return as " a tingling or prickly sensation near your surgical site.  You will experience more discomfort from your incision as the feeling returns.  You may want to take a pain pill (a narcotic or Tylenol if this was prescribed by your surgeon) when you start to experience mild pain before the pain beccomes more severe.  If your pain medications do not control your pain you should notifiy your surgeon.    Tips for taking pain medications  To get the best pain relief possible, remember these points:    Take pain medications as directed, before pain becomes severe.    Pain medication can upset your stomach: taking it with food may help.    Constipation is a common side effect of pain medication. Drink plenty of  fluids.    Eat foods high in fiber. Take a stool softener if recommended by your doctor or pharmacist.    Do not drink alcohol, drive or operate machinery while taking pain medications.    Ask about other ways to control pain, such as with heat, ice or relaxation.    Tylenol/Acetaminophen Consumption  To help encourage the safe use of acetaminophen, the makers of TYLENOL  have lowered the maximum daily dose for single-ingredient Extra Strength TYLENOL  (acetaminophen) products sold in the [place] from 8 pills per day (4,000 mg) to 6 pills per day (3,000 mg). The dosing interval has also changed from 2 pills every 4-6 hours to 2 pills every 6 hours.    If you feel your pain relief is insufficient, you may take Tylenol/Acetaminophen in addition to your narcotic pain medication.     Be careful not to exceed 3,000 mg of Tylenol/Acetaminophen in a 24 hour period from all sources.    If you are taking extra strength Tylenol/acetaminophen (500 mg), the maximum dose is 6 tablets in 24 hours.    If you are taking regular strength acetaminophen (325 mg), the maximum dose is 9 tablets in 24 hours.    Call a doctor for any of the followin. Signs of infection (fever, growing tenderness at the surgery site, a large amount of  drainage or bleeding, severe pain, foul-smelling drainage, redness, swelling).  2. It has been over 8 to 10 hours since surgery and you are still not able to urinate (pass water).  3. Headache for over 24 hours.  4. Numbness, tingling or weakness the day after surgery (if you had spinal anesthesia).  5. Signs of Covid-19 infection (temperature over 100 degrees, shortness of breath, cough, loss of taste/smell, generalized body aches, persistent headache, chills, sore throat, nausea/vomiting/diarrhea)  Your doctor is:  Dr. Ani Warren, Plastic Surgery: [phone]   Or dial [phone] and ask for the resident on call for:  Plastics  For emergency care, call the:  [facility]:  [phone] (TTY for hearing impaired: [phone])  ON-Q  C-bloc Continuous Nerve Block Discharge Instructions    The Nerve Block    Your anesthesiologist performed a nerve block (a procedure that blocks pain to only a specific area) by inserting a small catheter (tube) in your body.  This catheter is connected to tubing and to a pump that will help control your pain.  The Medicine/Rate______________________________________________    The pump is shaped like a balloon and is filled with     medicine that causes numbness or loss of sensation to help control your pain.  The pain pump DOES NOT contain narcotics.      The medicine in the pump may alter your ability to feel changes in temperature or pressure.  Depending on where the catheter was placed, it may affect your ability to control movement.  After the first few hours you may get some soreness as well as movement back; this is normal.  The Pump      DO NOT SQUEEZE THE PUMP.     The pump delivers medicine at a very slow rate.    You will NOT see the medicine moving through the tubing.    As the medicine is delivered, the pump ball will slowly become smaller.    It may take a day or so before you notice a change in the size and look of the pump.    Depending  on the size of your pump, it may take 2 - 5 days to give all the medicine.    The middle part of the pump may look like an apple core when empty.      Photo used with permission from [employer].    Managing Your Pain    The continuous nerve block infusion may not block all of the pain from your surgery (and the benefits of the pump can vary from patient to patient).  It is important that you take the pain medicines prescribed by your surgeon if you need them.      If you continue to have difficulty with your pain control, please page or call the anesthesiologist.  Caring for Your Pump at Home    Wear the pump on the outside of clothing - away from your skin and cold therapy (ice packs).  The delivery rate is accurate only at room temperature.    Make sure the white clamp on the tubing remains open (moves freely on the tubing).    Make sure there are no kinks in the tubing.    DO NOT tape or cover up the filter.    Protect the pump from sunlight and heat.    When sleeping:   o DO NOT place the pump underneath the bed covers where the pump may become too warm.  o DO NOT place the pump on the floor or hang the pump on a bed post as these situations may cause the tubing to get tangled and get pulled out.    Bathing/Showering:    o We recommend taking sponge baths until the pump is removed.  o Avoid getting the area where the catheter enters your body wet.  o DO NOT let water get in the filter.  o DO NOT submerge the pump in water.  The Infusion    The infusion will be started by the Surgical Center.  DO NOT turn the infusion off unless your anesthesiologist has told you to do so.    DO NOT change the flow rate of the infusion unless your anesthesiologist told you to do so.  Changing the flow rate without your doctor s instruction may result in the wrong dose of medicine, which could cause serious injury.    If your anesthesiologist told you to change the rate of your infusion:  o Flip open the clear cover on the  select-a-flow device.  o Turn the white key clockwise on the select-a-flow dial to the instructed rate.  (The rate of the infusion should line up with the black arrow at the top of the controller.)    o Listen for the click when you move the dial.  o The key may be removed from the select-a-flow dial, or the plastic cover on the device may be zip tied shut to ensure safety.      Photo used with permission from [employer].    Activity     DO NOT drive or operate heavy machinery if the nerve block affects an extremity    DO NOT bear weight on the affected extremity until sensation and motion return and directed by your physician    Elevate affected extremity; pillows work well for elevation.    Take care to avoid objects that may put pressure on or cause trauma to the limb.  Be careful when placing hot or cold objects on the numb area.    For Upper Extremity Nerve Blocks, using the non-operative extremity, you may do range of motion exercises hourly while awake unless directed otherwise.    For Knee Surgery patients with a Femoral or Adductor Canal catheter you must have an Immobilizer on at all times when up until the catheter is removed and full sensation and strength have returned.    For Lower Extremity Nerve Blocks make sure someone is with you the first time you attempt to place full weight on your operative side.  Removing the Catheter    When the pump is empty or if you have been told to stop the infusion you can remove the catheter.  Follow these steps::  o Wash your hands.  o Clamp the tubing (squeeze the white clamp until you hear or feel a click).  o Remove the clear dressing that covers the tubing.  o Grasp the catheter close to the skin and gently pull.  If you meet resistance, STOP pulling and page or call your anesthesiologist.  o DO NOT cut or forcefully remove the catheter.  o After removal, check the end of the catheter for a dark tip.  If you DO NOT see a dark tip, page or call your  anesthesiologist.  o Apply firm pressure over the site until oozing stops.  Wash the area with soap and water, dry with a clean towel and then cover with a bandage.   o The pump is not reusable or refillable.  Dispose of it in the trash and wash your hands.  Troubleshooting    Tubing Comes Out From Skin:  If the tube accidentally comes out, check the end of the tubing for a dark tip.    If you see a dark tip simply discard it and use the pain pills prescribed to you by your surgeon.    If you DON T see a dark tip, page or call the anesthesiologist.    Tubing Disconnection:  If the tubing accidentally becomes disconnected from the pump, DO NOT reconnect the pump to the tubing.  It may have been contaminated with germs.  Close the tubing clamp and immediately page or call your anesthesiologist.    Fluid Leaking:  If fluid is leaking from the site catheter insertion site, close the white clamp on the tubing and page or call your anesthesiologist.    Immediately report the following to your anesthesiologist:    Redness, warmth, swelling, or tenderness at the site the tubing was inserted    Increase in pain    Fever, chills, sweats    Bowel or bladder changes    Difficulty breathing    Dizziness, lightheadedness    Blurred vision    Ringing or buzzing in your ears    Metal taste in your mouth    Numbness and/or tingling around your mouth, fingers or toes    Drowsiness    Confusion    Trouble removing the tubing    Dark tip is not present when tubing is removed      Notifying your Anesthesiologist  o Page:  Dial [phone], then enter [phone].  You will be prompted to enter your phone number and then the # sign.  The anesthesiologist will call you back.  o Call:  Dial [phone].  Ask to speak to the anesthesiologist on call for the [facility].   Caring for Your Ike-Rivero Drain    You have been discharged with a Ike-Rivero drainage tube. This tube drains fluid from your incision, helping  prevent swelling and reducing the risk for infection. The tube is held in place by a few stitches. The drain will be removed when your doctor determines you no longer need it and when the amount of drainage decreases. The color and amount of fluid varies. Right after surgery, the fluid may be bright red and may become clearer over time.   Dressing:    Keep the skin around the tube dry.    If you have a dressing, change it every day.   o Wash your hands.  o Remove the old bandage. Do not use scissors-you may accidentally cut the tube.  o Check for any redness, swelling, drainage, or broken stitches. (Call your doctor with any of these findings).  o Wash your hands again.  o Wet a cotton swab (Q-tip) and clean around the incision and the tube site. Use normal saline solution (salt and water) or soap and water. Start at the tube site and move outward in a circular motion.   o Pat dry.  o Put a new bandage on the incision and tube site. Make the bandage large enough to cover the whole incision area.   o Tape the bandage in place.  o Throw out old materials and wash your hands.   Home Care:    Tape the tube to the skin below the bandage. Make sure to keep some slack in the tube to keep it from pulling out.     DO NOT sleep on the same side as the tube.    Secure the tube and bulb inside your clothing with a safety pin. This helps keep the tube from being pulled out.     Keep the bulb compressed at all times, except when you empty it.    Empty your drain at least twice a day. Empty it more often if the drain is full.   o Lift the opening of the drain.  o Drain the fluid into a measuring cup.  o Record the amount of fluid each time you empty. Share the information with your doctor at your follow-up visit.   o Squeeze the bulb with your hands until you hear air coming out of the bulb.  o Close the opening.     Tape plastic wrap over the bandage and tube site when you shower.      Stripping  the tube helps keep blood clots  from blocking the tube.                         ONLY DO THIS IF YOUR DOCTOR INSTRUCTED YOU TO DO SO!  o Hold the tubing where it leaves the skin with one hand. This keeps it from pulling on the skin.  o Pinch the tubing with the thumb and first finger of your other hand.   o Slowly and firmly pull your thumb and first finger down the tube (squeezing the tube between your fingers). Keep squeezing the tube as you run your fingers towards the bulb. If the pulling hurts or feels like it is coming out of the skin, STOP. Begin again more gently.  o Let go of the tubing with both hands. If the tube is still blocked, repeat these steps three or four times. Make sure that the bulb is compressed so it creates suction.  When to call your doctor:    New or increased pain around the tube    Redness, warmth, or swelling around the incision or tube    Drainage that is foul smelling    Vomiting    Fever over 101 F degrees    Fluid leaking around the tube    Incision seems not to be healing    Stitches become loose    The tube falls out    Drainage that changes from light pick to dark red    A sudden increase or decrease in the amount of drainage (over 30 ml).  Your drainage record:  Date Time Bulb 1: Amount of drainage (ml or cc) Bulb 2: Amount of drainage (ml or cc) Notes

## 2021-09-16 NOTE — OP NOTE
Procedure Date: 09/15/2021    ATTENDING SURGEON:  Ani Warren MD    RESIDENT SURGEON:  Piedad Paez.    PREOPERATIVE DIAGNOSIS:  Gender dysphoria.    POSTOPERATIVE DIAGNOSIS:  Gender dysphoria.    PROCEDURE PERFORMED:  Bilateral simple mastectomies with nipple graft reconstruction.  On-Q catheter placement.    ANESTHESIA:  General LMA.    ESTIMATED BLOOD LOSS:  100 mL    INTRAVENOUS FLUIDS:  900 mL    URINE OUTPUT:  300 mL    COUNTS:  Correct.    COMPLICATIONS:  None.    DRAINS:  ANA x2.    SPECIMENS:  Right breast 98 grams, left breast 105 grams.    INDICATIONS FOR PROCEDURE:  This is a 26-year-old biological female transitioning to male.  They have a diagnosis of gender dysphoria and met WPATH and insurance criteria for gender affirming top surgery.  Due to the patient's nipple ptosis, they would require double incisions and desired nipple graft reconstruction.  This is despite having very small breast volume.    DESCRIPTION OF PROCEDURE:  The patient was seen in the preoperative waiting area.  The operative sites were marked.  This included sternal notch, sternal midline, inframammary folds, which were moderately developed and quite low on the chest due to a long chest, thorax, medial and lateral borders of the foot plate of the breast tissue, vertical line through the nipple areolar complex, palpable level of the inferior origin of the pectoralis major muscle.  Transposed transverse line from the mid humerus.  Informed consent was obtained after reviewing the possible risks, complications including but not limited to the following:  Infection, bleeding, hematoma, seroma formation, poor healing, possible dehiscence, possible spitting suture, possible hypertrophic scarring, possible partial or complete loss of the nipple graft, possible injury to surrounding neurovascular musculoskeletal structures as well as intrathoracic and interaxillary structures, possible residual deformities and asymmetries, possible need  for further surgery, possible altered sensation of the chest wall either hypo or hypersensitivity, and possible anesthetic risks such as DVT, PE and cardiopulmonary arrest.  The patient was then brought to the operating room and placed supine on the OR table.  After general anesthesia was administered, and the patient had an LMA placed, arms were secured to arm boards with Ace wraps.  Viera was placed.  The patient already had sequential compression devices on lower extremities prior to induction.  Lower Brian Hugger was placed.  The patient was put into a sitting position to check for symmetry and adjustments were made on the table.  Chest breast area was then prepped and draped in the usual sterile fashion using ChloraPrep.  After timeout was taken and the proper patient and procedure were identified, we made our inframammary fold incisions just a bit higher than what we had marked preoperatively.  Approximately 2 cm of subcutaneous fat was left along the IMF before beveling down to the pectoralis fascia.  Breast mound was elevated off the pectoral fascia superiorly towards clavicle, medially towards parasternal border and laterally past lateral border of the pectoralis major muscle.  This then allowed us to displace the breast mound inferiorly and dawit where it overlapped with the IMF incision that would be raised to the level of the pectoral origin inferiorly.  The nipple areolar complex was then harvested sharply with #10 blade.  This was kept wrapped in normal saline gauze on the back table, marked per side.  Breast mound was then amputated.  Additional tissue was removed as we thinned the superior skin flap.  This patient is quite slight in build and his ribs were fairly noticeable.  All breast tissue removed, was ultimately weighed off the back table before being sent to Pathology for histopathologic exam.  When we were happy with our original resection, incisions were skin stapled temporarily, and the  patient was put back into a sitting position.  Fortunately, the table did not go up to 90 degrees.  Despite the patient's long chest and preoperative markings, we felt that we needed to raise our incisions just a little bit.  We also extended that laterally to minimize any dog ear deformity given his small stature.  These additional areas were resected and added to the path specimen.  Of note, during the entire case, this patient had significant bleeding.  A very thick dermis and just a very well perfused.  A bit challenging to get hemostasis, but ultimately was achieved with electrocautery and direct pressure.  When we were happy with our contour with regards to anterior chest as well as the shape and placement of the IMF incisions, #15 round ANA drains were introduced through a separate stab wound incision laterally and secured with 3-0 nylon suture.  This was draped along the inferior pocket.  Dual 10 inch On-Q catheters were percutaneously introduced from the epigastric region and draped along the superior pocket.  These were secured with benzoin and Tegaderm.  Definitive closure was then achieved using 3-0 Vicryl deep dermal buried sutures followed by 4-0 Vicryl running subcuticular Vicryl.  Of note, we did use a few 2-0 Vicryls to help flatten the contour of the flap and prevent any hallowing along the IMF where the nipple graft would go.  This was done on both sides.  This was anchored to the extra tissue along the IMF and the deeper fibrous breast tissue of the superior skin flap.  Once we had closure, we then put the patient into a sitting position and used nipple templates approximately 1.5 cm in diameter.  This allowed us to find the most aesthetic placement for nipple grafting.  This was approximately 2.5 cm above the IMF and for his narrow chest about 9 cm from the midline.  We deepithelialized that sharply with a 15-blade.  Hemostasis was achieved with direct pressure and some cautery.  The nipple  areolar complexes were thinned aggressively on the back table and trimmed of any excess areola, as well as nipple tissue to fit the recipient site.  This was secured with a 5-0 fast absorbing gut interrupted and running cuticular sutures as well as centrally for the nipple.  The graft was then pie crusted with an 18-gauge needle.  A bolster dressing consisting of Xeroform sheet with antibiotic-soaked cotton balls was held in place with skin staples and 2-0 silk tie-overs.  Exofin and Dermabond was then applied to our incisions.  ANA tubing was trimmed and put to bulb suction.  ABDs were used for drain sponges and a couple Kerlix rolls were unfurled across the anterior chest for padding before wrapping the thorax circumferentially with a double long 6-inch Ace for compression.  The Viera was discontinued.  The patient was extubated.  He was transferred to a stretcher.  The On-Q catheters were attached to the reservoir containing 550 mL of 0.2% ropivacaine to be delivered at 2 mL per hour per catheter for the next 5 days.  Now, this patient was over 100 pounds.  The patient was then taken to the recovery room in satisfactory condition having tolerated the procedure without difficulty or complication.  Of note, there was some issue with the right sided ANA drain having a slow leak.  This was advanced prior to any of the closure and resecured with a pursestring suture.  It was suctioning upon leaving the OR.  Tissues were weighed and sent to pathology.  We did remove 98 grams from the right and 105 grams from the left.    Ani Warren MD        D: 2021   T: 2021   MT: [id number]    Name:     JOSEWALDEMAR ALQAMAR JEREZN:      -[id number]        Account:        [id number]   :      1995           Procedure Date: 09/15/2021     Document: [id number]

## 2021-09-19 LAB
PATH REPORT.COMMENTS IMP SPEC: NORMAL
PATH REPORT.COMMENTS IMP SPEC: NORMAL
PATH REPORT.FINAL DX SPEC: NORMAL
PATH REPORT.GROSS SPEC: NORMAL
PATH REPORT.MICROSCOPIC SPEC OTHER STN: NORMAL
PATH REPORT.RELEVANT HX SPEC: NORMAL
PHOTO IMAGE: NORMAL

## 2021-09-20 NOTE — PROGRESS NOTES
"Plastic Surgery Outpatient Visit    ID: Isaiah Fritz is a 26 year old male s/p bilateral mastectomy with nipple grafts 9/15/2021 with Dr. Warren.     S: Doing well, pain controlled. Drain output <20cc daily.     O:  /67 (BP Location: Left arm, Patient Position: Sitting, Cuff Size: Adult Regular)   Pulse 68   Temp 98.4  F (36.9  C) (Oral)   Ht 1.626 m (5' 4\")   Wt 49 kg (108 lb 1.6 oz)   LMP  (LMP Unknown)   SpO2 99%   BMI 18.56 kg/m     General: NAD  Chest: bilateral nipples well adhered, pink with darker areas. Bilateral incisions c/d/i with tape intact. Mild ecchymosis, good overall contour.     PATH:  A. LEFT breast, simple mastectomy with nipple graft reconstruction:  -Benign breast tissue  -Negative for atypia or malignancy     B. RIGHT breast, simple mastectomy with nipple graft reconstruction:  -Benign breast tissue   -Negative for atypia or malignancy       A/P:  -healing well  -drains removed today  -nipple dressings x1 week  -wrap x1 week  - tape off in 2 weeks.  -Trex/5lb lifting restrictions x2 more weeks. Ok to increase movement and lifting up to 10lbs after this until 6 weeks post op, when restrictions will likely be cleared.  -RTC 6 weeks with Dr. Briana Gomez PA-C  Plastic and Reconstructive Surgery    15 minutes spent on the date of the encounter doing chart review, history and physical, dressing changes, documentation and further activity as noted above.      "

## 2021-09-21 ENCOUNTER — OFFICE VISIT (OUTPATIENT)
Dept: PLASTIC SURGERY | Facility: CLINIC | Age: 26
End: 2021-09-21
Payer: COMMERCIAL

## 2021-09-21 VITALS
BODY MASS INDEX: 18.45 KG/M2 | OXYGEN SATURATION: 99 % | DIASTOLIC BLOOD PRESSURE: 67 MMHG | SYSTOLIC BLOOD PRESSURE: 118 MMHG | HEIGHT: 64 IN | HEART RATE: 68 BPM | WEIGHT: 108.1 LBS | TEMPERATURE: 98.4 F

## 2021-09-21 DIAGNOSIS — F64.0 GENDER DYSPHORIA IN ADOLESCENT AND ADULT: Primary | ICD-10-CM

## 2021-09-21 PROCEDURE — 99024 POSTOP FOLLOW-UP VISIT: CPT | Performed by: PHYSICIAN ASSISTANT

## 2021-09-21 ASSESSMENT — MIFFLIN-ST. JEOR: SCORE: 1215.34

## 2021-09-21 ASSESSMENT — PAIN SCALES - GENERAL: PAINLEVEL: NO PAIN (0)

## 2021-09-21 NOTE — LETTER
"9/21/2021       RE: Silvia Fritz  [address] [facility] [address]     Dear Colleague,    Thank you for referring your patient, Silvia Fritz, to the [facility] PLASTIC AND RECONSTRUCTIVE SURGERY CLINIC [place] at [facility]. Please see a copy of my visit note below.    Plastic Surgery Outpatient Visit    ID: Isaiah Fritz is a 26 year old male s/p bilateral mastectomy with nipple grafts 9/15/2021 with Dr. Warren.     S: Doing well, pain controlled. Drain output <20cc daily.     O:  /67 (BP Location: Left arm, Patient Position: Sitting, Cuff Size: Adult Regular)   Pulse 68   Temp 98.4  F (36.9  C) (Oral)   Ht 1.626 m (5' 4\")   Wt 49 kg (108 lb 1.6 oz)   LMP  (LMP Unknown)   SpO2 99%   BMI 18.56 kg/m     General: NAD  Chest: bilateral nipples well adhered, pink with darker areas. Bilateral incisions c/d/i with tape intact. Mild ecchymosis, good overall contour.     PATH:  A. LEFT breast, simple mastectomy with nipple graft reconstruction:  -Benign breast tissue  -Negative for atypia or malignancy     B. RIGHT breast, simple mastectomy with nipple graft reconstruction:  -Benign breast tissue   -Negative for atypia or malignancy       A/P:  -healing well  -drains removed today  -nipple dressings x1 week  -wrap x1 week  - tape off in 2 weeks.  -Trex/5lb lifting restrictions x2 more weeks. Ok to increase movement and lifting up to 10lbs after this until 6 weeks post op, when restrictions will likely be cleared.  -RTC 6 weeks with Dr. Briana Gomez PA-C  Plastic and Reconstructive Surgery    15 minutes spent on the date of the encounter doing chart review, history and physical, dressing changes, documentation and further activity as noted above.          Again, thank you for allowing me to participate in the care of your patient.      Sincerely,    Yolanda Gomez PA-C      "

## 2021-09-21 NOTE — PATIENT INSTRUCTIONS
Care of Nipples and Chest Incisions    Change nipple dressings daily.  Take dressings off before or after showering.  No direct shower spray on nipple grafts for 4 weeks from your surgery date.     Cut vaseline gauze to nipple size and place over nipple.  Place folded white gauze over vaseline gauze and cover with plastic dressing.  Do dressing changes for 1 more week from today.  If you continue to have drainage, continue the dressings until drainage stops.     Keep compression on for 1 more week from today. No lifting greater than 5 lbs for 4 weeks from your surgery date. Begin moisturizing your incisions with any non-scented, non-glittered lotion 3 weeks from your surgery date. Once this becomes loose, you may peel off the incisional tape. Then apply silicone gel sheets to incisions.  These can be purchased on [employer] and at [employer].     At one month post op, baseline shoulder motion should return. You may start to exercise lightly at this time, gradually moving up to arm movement. Full shoulder motion should return by 8 weeks.      When to call:  Sudden increase of swelling or pain on one side  Uncontrolled pain despite pain medication  Worsening of chest swelling   Separation of nipple from chest skin  Redness and warmth in chest area  Fever > 101     Contact the RN between 8-3:30 Mon-Fri with questions or concerns through my chart message via your doctor's name (most efficient) or call at [phone].   For urgent medical issues that cannot wait, call [phone] Mon-Fri 8:-4:30.     After hours, weekends or holidays, call [phone] and ask to speak to the on call plastic surgeon fellow.

## 2021-10-10 ENCOUNTER — HEALTH MAINTENANCE LETTER (OUTPATIENT)
Age: 26
End: 2021-10-10

## 2021-10-26 ENCOUNTER — OFFICE VISIT (OUTPATIENT)
Dept: PLASTIC SURGERY | Facility: CLINIC | Age: 26
End: 2021-10-26
Payer: COMMERCIAL

## 2021-10-26 VITALS
HEART RATE: 68 BPM | HEIGHT: 64 IN | DIASTOLIC BLOOD PRESSURE: 65 MMHG | TEMPERATURE: 98.4 F | SYSTOLIC BLOOD PRESSURE: 103 MMHG | BODY MASS INDEX: 19.29 KG/M2 | WEIGHT: 113 LBS | OXYGEN SATURATION: 98 %

## 2021-10-26 DIAGNOSIS — Z90.13 S/P BILATERAL MASTECTOMY: Primary | ICD-10-CM

## 2021-10-26 PROCEDURE — 99024 POSTOP FOLLOW-UP VISIT: CPT | Performed by: SURGERY

## 2021-10-26 ASSESSMENT — MIFFLIN-ST. JEOR: SCORE: 1237.56

## 2021-10-26 ASSESSMENT — PAIN SCALES - GENERAL: PAINLEVEL: NO PAIN (1)

## 2021-10-26 NOTE — PROGRESS NOTES
PLASTICS POST-OP  This is a 26 year old trans male with a history of gender dysphoria who presents 6 weeks post-op after a bilateral simple mastectomy with nipple graft reconstruction on 09/06/2021. He is here today by himself. Patient states not having any pain. He states that he only took one oxycodone. He felt that the OnQ helped in pain. He experienced nausea/vomiting post procedure. Patient states having pain in his upper sternum while doing certain movents. He states that sensation is coming back. ROM is 100%. For scarring, he has been using Mederma at bedtime. Patient walks around the house without a shirt on around bedtime. Overall, Isaiah is very happy with his results.    PE: Chest: Nice upper chest contour.   Good symmetry.   Spitting suture on R lateral incisions  L nipple is slightly bigger than R nipple.  Less pigmentation on right NG.  No bilateral dog ears  Minimally thick scarring   Incisions do not touch at midline  Photos taken with verbal consent.     A&P: 26 year old trans male  who presents 6 weeks post-op after a bilateral simple mastectomy with nipple graft conservation on 09/15/2021.     Overall Isaiah is healing well. He can gradually increase activity as tolerated. Has been doing light duty at work but wants to return to preop full exertion without restrictions. We will be happy to provide any written release if required by his employer.    We discussed scar reducing techniques, such as Mederma, silicone strips, vitamin E oil, emu oil, massage and when he may begin using these.     He will follow up 6-12 months post-op. Causes for returning sooner were discussed.     Total time = 15 minutes, spent on the date of encounter doing chart review, history and physical, dressing changes, documentation, patient education, and any further activity as noted above.     This note was prepared on behalf of Ani Warren MD by Brittni Villanueva, a trained medical scribe, based on my observations and  the provider's statements to me.

## 2021-10-26 NOTE — LETTER
10/26/2021       RE: Silvia Fritz  [address] [facility] [address]     Dear Colleague,    Thank you for referring your patient, Silvia Fritz, to the [facility] PLASTIC AND RECONSTRUCTIVE SURGERY CLINIC [place] at [facility]. Please see a copy of my visit note below.    PLASTICS POST-OP  This is a 26 year old trans male with a history of gender dysphoria who presents 6 weeks post-op after a bilateral simple mastectomy with nipple graft reconstruction on 09/06/2021. He is here today by himself. Patient states not having any pain. He states that he only took one oxycodone. He felt that the OnQ helped in pain. He experienced nausea/vomiting post procedure. Patient states having pain in his upper sternum while doing certain movents. He states that sensation is coming back. ROM is 100%. For scarring, he has been using Mederma at bedtime. Patient walks around the house without a shirt on around bedtime. Overall, Isaiah is very happy with his results.    PE: Chest: Nice upper chest contour.   Good symmetry.   Spitting suture on R lateral incisions  L nipple is slightly bigger than R nipple.  Less pigmentation on right NG.  No bilateral dog ears  Minimally thick scarring   Incisions do not touch at midline  Photos taken with verbal consent.     A&P: 26 year old trans male  who presents 6 weeks post-op after a bilateral simple mastectomy with nipple graft conservation on 09/15/2021.     Overall Isaiah is healing well. He can gradually increase activity as tolerated. Has been doing light duty at work but wants to return to preop full exertion without restrictions. We will be happy to provide any written release if required by his employer.    We discussed scar reducing techniques, such as Mederma, silicone strips, vitamin E oil, emu oil, massage and when he may begin using these.     He will follow up 6-12 months post-op. Causes for returning sooner were  discussed.     Total time = 15 minutes, spent on the date of encounter doing chart review, history and physical, dressing changes, documentation, patient education, and any further activity as noted above.     This note was prepared on behalf of Ani Warren MD by Brittni Villanueva, a trained medical scribe, based on my observations and the provider's statements to me.         Again, thank you for allowing me to participate in the care of your patient.      Sincerely,    Ani Warren MD

## 2021-10-26 NOTE — NURSING NOTE
"Chief Complaint   Patient presents with     AMOR Colon, is being seen today for a 6 week post op DOS 9/15.       Vitals:    10/26/21 1046   BP: 103/65   BP Location: Left arm   Patient Position: Chair   Cuff Size: Adult Regular   Pulse: 68   Temp: 98.4  F (36.9  C)   TempSrc: Oral   SpO2: 98%   Weight: 51.3 kg (113 lb)   Height: 1.626 m (5' 4\")       Body mass index is 19.4 kg/m .      Palmira Anderson LPN    "

## 2021-11-17 ENCOUNTER — VIRTUAL VISIT (OUTPATIENT)
Dept: PSYCHIATRY | Facility: CLINIC | Age: 26
End: 2021-11-17
Attending: NURSE PRACTITIONER
Payer: COMMERCIAL

## 2021-11-17 DIAGNOSIS — F84.0 AUTISM: ICD-10-CM

## 2021-11-17 DIAGNOSIS — F98.8 ATTENTION DEFICIT DISORDER, UNSPECIFIED HYPERACTIVITY PRESENCE: Primary | ICD-10-CM

## 2021-11-17 PROCEDURE — 99213 OFFICE O/P EST LOW 20 MIN: CPT | Mod: TEL | Performed by: NURSE PRACTITIONER

## 2021-11-17 PROCEDURE — 90833 PSYTX W PT W E/M 30 MIN: CPT | Mod: TEL | Performed by: NURSE PRACTITIONER

## 2021-11-17 RX ORDER — ATOMOXETINE 10 MG/1
10 CAPSULE ORAL EVERY MORNING
Qty: 90 CAPSULE | Refills: 0 | Status: SHIPPED | OUTPATIENT
Start: 2021-11-17 | End: 2022-03-24 | Stop reason: DRUGHIGH
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
## 2021-11-17 NOTE — PATIENT INSTRUCTIONS
-Continue on current medication regimen.  -Set alarm to take the medication daily.    Your next appointment is scheduled on 2/6/2022 (Wed) at 2:30pm.    Thank you for coming to the [facility] [place] [facility].    Lab Testing:  If you had lab testing today and your results are reassuring or normal they will be mailed to you or sent through [employer] within 7 days. If the lab tests need quick action we will call you with the results. The phone number we will call with results is # [phone] (home) . If this is not the best number please call our clinic and change the number.    Medication Refills:  If you need any refills please call your pharmacy and they will contact us. Our fax number for refills is [phone]. Please allow three business for refill processing. If you need to  your refill at a new pharmacy, please contact the new pharmacy directly. The new pharmacy will help you get your medications transferred.     Scheduling:  If you have any concerns about today's visit or wish to schedule another appointment please call our office during normal business hours [phone] (8-5:00 M-F)    Contact Us:  Please call [phone] during business hours (8-5:00 M-F).  If after clinic hours, or on the weekend, please call  [phone].    Financial Assistance [phone]  [facility] [phone]  [place] Billing [facility], [facility]: [phone]  [facility] Billing [phone]  Medical Records [phone]      MENTAL HEALTH CRISIS NUMBERS:  For a medical emergency please call  611 or go to the nearest ER.     [facility]:   [facility] -[phone]   Crisis Residence [facility] -[phone]   Walk-In Counseling Center [facility] -[phone]   COPE 24/7 [place] Mobile Team -[phone] (adults)/[phone] (child)  CHILD: [facility] needs assessment team - [phone]      [facility]:   [facility] - [phone]    Walk-in counseling [facility] - [phone]   Walk-in counseling [facility] - [phone]   Crisis Residence [facility] - [phone]  Urgent Care Adult Mental Ppknqw-[phone] mobile unit/ 24/7 crisis line    National Crisis Numbers:   National Suicide Prevention Lifeline: 3-916-852-TALK (529-348-2568)  Poison Control Center - 4-095-450-9807  FOCUS RESEARCH/resources for a list of additional resources (SOS)  Trans Lifeline a hotline for transgender people 0-421-529-0735  [employer] a hotline for LGBT youth 1-579.991.7192  Crisis Text Line: For any crisis 24/7   To: 813219  see www.crisistextline.org  - IF MAKING A CALL FEELS TOO HARD, send a text!         Again thank you for choosing [facility] [place] [facility] and please let us know how we can best partner with you to improve you and your family's health.    You may be receiving a survey regarding this appointment. We would love to have your feedback, both positive and negative. The survey is done by an external company, so your answers are anonymous.

## 2021-12-29 DIAGNOSIS — F64.9 GENDER DYSPHORIA: ICD-10-CM

## 2021-12-29 RX ORDER — TESTOSTERONE CYPIONATE 200 MG/ML
60 INJECTION, SOLUTION INTRAMUSCULAR WEEKLY
Qty: 4 ML | Refills: 3 | Status: CANCELLED | OUTPATIENT
Start: 2021-12-29

## 2021-12-29 NOTE — TELEPHONE ENCOUNTER
"Request for medication refill:  testosterone cypionate (DEPOTESTOSTERONE) 200 MG/ML injection    Providers if patient needs an appointment and you are willing to give a one month supply please refill for one month and  send a letter/MyChart using \".SMILLIMITEDREFILL\" .smillimited and route chart to \"P SMI \" (Giving one month refill in non controlled medications is strongly recommended before denial)    If refill has been denied, meaning absolutely no refills without visit, please complete the smart phrase \".smirxrefuse\" and route it to the \"P SMI MED REFILLS\"  pool to inform the patient and the pharmacy.    Mahi Tinsley MA        "

## 2021-12-30 DIAGNOSIS — F64.9 GENDER DYSPHORIA: ICD-10-CM

## 2021-12-30 NOTE — TELEPHONE ENCOUNTER

## 2022-01-05 RX ORDER — TESTOSTERONE CYPIONATE 200 MG/ML
60 INJECTION, SOLUTION INTRAMUSCULAR WEEKLY
Qty: 4 ML | Refills: 3 | Status: SHIPPED | OUTPATIENT
Start: 2022-01-05 | End: 2022-05-02

## 2022-02-08 ENCOUNTER — OFFICE VISIT (OUTPATIENT)
Dept: FAMILY MEDICINE | Facility: CLINIC | Age: 27
End: 2022-02-08
Payer: COMMERCIAL

## 2022-02-08 VITALS
DIASTOLIC BLOOD PRESSURE: 67 MMHG | RESPIRATION RATE: 16 BRPM | HEART RATE: 65 BPM | OXYGEN SATURATION: 98 % | WEIGHT: 114.8 LBS | TEMPERATURE: 98 F | BODY MASS INDEX: 19.71 KG/M2 | SYSTOLIC BLOOD PRESSURE: 102 MMHG

## 2022-02-08 DIAGNOSIS — Z23 HIGH PRIORITY FOR 2019-NCOV VACCINE: ICD-10-CM

## 2022-02-08 DIAGNOSIS — F64.9 GENDER DYSPHORIA: Primary | ICD-10-CM

## 2022-02-08 LAB
ALBUMIN SERPL-MCNC: 3.7 G/DL (ref 3.4–5)
ALP SERPL-CCNC: 99 U/L (ref 40–150)
ALT SERPL W P-5'-P-CCNC: 16 U/L (ref 0–70)
ANION GAP SERPL CALCULATED.3IONS-SCNC: 8 MMOL/L (ref 3–14)
AST SERPL W P-5'-P-CCNC: 14 U/L (ref 0–45)
BILIRUB SERPL-MCNC: 0.5 MG/DL (ref 0.2–1.3)
BUN SERPL-MCNC: 9 MG/DL (ref 7–30)
CALCIUM SERPL-MCNC: 8.9 MG/DL (ref 8.5–10.1)
CHLORIDE BLD-SCNC: 109 MMOL/L (ref 94–109)
CHOLEST SERPL-MCNC: 152 MG/DL
CO2 SERPL-SCNC: 23 MMOL/L (ref 20–32)
CREAT SERPL-MCNC: 0.83 MG/DL (ref 0.52–1.25)
FASTING STATUS PATIENT QL REPORTED: NORMAL
GFR SERPL CREATININE-BSD FRML MDRD: >90 ML/MIN/1.73M2
GLUCOSE BLD-MCNC: 78 MG/DL (ref 70–99)
HDLC SERPL-MCNC: 45 MG/DL
HGB BLD-MCNC: 14.4 G/DL (ref 11.7–17.7)
LDLC SERPL CALC-MCNC: 96 MG/DL
NONHDLC SERPL-MCNC: 107 MG/DL
POTASSIUM BLD-SCNC: 3.9 MMOL/L (ref 3.4–5.3)
PROT SERPL-MCNC: 6.8 G/DL (ref 6.8–8.8)
SODIUM SERPL-SCNC: 140 MMOL/L (ref 133–144)
TRIGL SERPL-MCNC: 53 MG/DL

## 2022-02-08 PROCEDURE — 99214 OFFICE O/P EST MOD 30 MIN: CPT | Mod: 25 | Performed by: STUDENT IN AN ORGANIZED HEALTH CARE EDUCATION/TRAINING PROGRAM

## 2022-02-08 PROCEDURE — 80061 LIPID PANEL: CPT | Performed by: STUDENT IN AN ORGANIZED HEALTH CARE EDUCATION/TRAINING PROGRAM

## 2022-02-08 PROCEDURE — 91305 COVID-19,PF,PFIZER (12+ YRS): CPT | Performed by: STUDENT IN AN ORGANIZED HEALTH CARE EDUCATION/TRAINING PROGRAM

## 2022-02-08 PROCEDURE — 36415 COLL VENOUS BLD VENIPUNCTURE: CPT | Performed by: STUDENT IN AN ORGANIZED HEALTH CARE EDUCATION/TRAINING PROGRAM

## 2022-02-08 PROCEDURE — 85018 HEMOGLOBIN: CPT | Performed by: STUDENT IN AN ORGANIZED HEALTH CARE EDUCATION/TRAINING PROGRAM

## 2022-02-08 PROCEDURE — 80053 COMPREHEN METABOLIC PANEL: CPT | Performed by: STUDENT IN AN ORGANIZED HEALTH CARE EDUCATION/TRAINING PROGRAM

## 2022-02-08 PROCEDURE — 0054A COVID-19,PF,PFIZER (12+ YRS): CPT | Performed by: STUDENT IN AN ORGANIZED HEALTH CARE EDUCATION/TRAINING PROGRAM

## 2022-02-08 PROCEDURE — 84403 ASSAY OF TOTAL TESTOSTERONE: CPT | Mod: KX | Performed by: STUDENT IN AN ORGANIZED HEALTH CARE EDUCATION/TRAINING PROGRAM

## 2022-02-08 NOTE — PROGRESS NOTES
"    Assessment & Plan     Gender dysphoria  25 year old transmale presenting for HRT follow-up. Continues on testosterone 60mg SQ weekly (last dose adjusted 7/29/21). Periods remain suppressed. Exam unremarkable. Education provided regarding body fat distribution, hip / pelvis expected changes on testosterone. Reviewed weight training as a mechanism to support testosterone changes of torso, pelvis. Plan for repeat LFTs, Hgb, lipids, midcycle testosterone for interval screening.  - needle, disp, 18G X 1\" MISC; Use for weekly testosterone injection  - Needle, Disp, 25G X 5/8\" MISC; Use to inject testosterone weekly.  - Hemoglobin; Future  - Comprehensive metabolic panel; Future  - Lipid panel reflex to direct LDL Non-fasting; Future  - Testosterone total; Future  - Hemoglobin  - Comprehensive metabolic panel  - Lipid panel reflex to direct LDL Non-fasting  - Testosterone total    High priority for 2019-nCoV vaccine  Booster reviewed patient agreeable and vaccine administered.  - COVID-19,PF,PFIZER (12+ Yrs GRAY LABEL)     Return in about 6 months (around 8/8/2022), or if symptoms worsen or fail to improve.    Margie Murray DO  [facility]    Margie Murray DO  [facility]  PGY-2    Subjective     Silvia Fritz is a 26 year old who presents to clinic today for the following health issues     HRT follow-up  Not sure about timeline of physical changes. Seeking clarification of expected changes.   Voice changed but weight distribution on hips has not. Would like that to change but hasn't seen a difference.  One day a month has cramps in the uterus but no blood or bleeding not sure if it's something else but one day a month - typically around same time.   Facial hair desired  Smaller hips desired  Thursdays are shot days  Midcycle labs today  Has met with Dr. Warren regarding chest surgery.     Only issue with supplies has been distribution of needles along with vials. Has enough syringes " Plan of care reviewed with patient. Pt transferred by wheelchair from ICU. Pt AAOx4. Pt with noted biliary drain in place draining tea colored drainage. George cath in place with clear yellow urine to bag. Pt denies pain at this time. Pt oriented to room and staff.Verbalizes to staff understanding. NSR on monitor with 0 red alarms noted.  No acute distress observed at this time. Side rails x2, bed in low position, call bell within reach. Bed alarm in place for patient safety. Will relay to oncoming nurse to monitor for changes in condition.    and testsosterone at the moment. Not enough needles.         Objective    /67   Pulse 65   Temp 98  F (36.7  C) (Oral)   Resp 16   Wt 52.1 kg (114 lb 12.8 oz)   SpO2 98%   BMI 19.71 kg/m    Body mass index is 19.71 kg/m .  Physical Exam  Vitals and nursing note reviewed.   Constitutional:       General: He is not in acute distress.     Appearance: Normal appearance. He is not ill-appearing.   HENT:      Head: Normocephalic and atraumatic.      Right Ear: External ear normal.      Left Ear: External ear normal.   Eyes:      General: No scleral icterus.     Extraocular Movements: Extraocular movements intact.      Conjunctiva/sclera: Conjunctivae normal.   Neck:      Thyroid: No thyroid mass, thyromegaly or thyroid tenderness.   Cardiovascular:      Rate and Rhythm: Normal rate and regular rhythm.      Pulses: Normal pulses.      Heart sounds: Normal heart sounds. No murmur heard.      Pulmonary:      Effort: Pulmonary effort is normal. No respiratory distress.      Breath sounds: Normal breath sounds. No wheezing or rhonchi.   Musculoskeletal:      Cervical back: Normal range of motion.      Right lower leg: No edema.      Left lower leg: No edema.   Skin:     General: Skin is warm and dry.      Capillary Refill: Capillary refill takes less than 2 seconds.      Comments: No rash at exposed skin   Neurological:      General: No focal deficit present.      Mental Status: He is alert. Mental status is at baseline.   Psychiatric:         Mood and Affect: Mood normal.         Behavior: Behavior normal.

## 2022-02-08 NOTE — PATIENT INSTRUCTIONS
Effects of Masculinizing Hormone Therapy: When They Happen      *if you have a uterus, you can get pregnant while on masculinizing hormones. Masculinizing hormones are not birth control, and they can affect the development of the fetus. If you are trying to get pregnant, you should stop masculinizing hormones and can choose to start therapy again at a later time. The effect of masculinizing hormone therapy on ovaries varies from person to person and is unknown.  **it may be possible to prevent and treat scalp hair loss

## 2022-02-10 NOTE — PROGRESS NOTES
Preceptor Attestation:   Patient seen, evaluated and discussed with the resident. I have verified the content of the note, which accurately reflects my assessment of the patient and the plan of care.   Supervising Physician:  Farshad Clay MD

## 2022-02-11 LAB — TESTOST SERPL-MCNC: 558 NG/DL (ref 8–950)

## 2022-02-16 ENCOUNTER — VIRTUAL VISIT (OUTPATIENT)
Dept: PSYCHIATRY | Facility: CLINIC | Age: 27
End: 2022-02-16
Attending: NURSE PRACTITIONER
Payer: COMMERCIAL

## 2022-02-16 DIAGNOSIS — F98.8 ATTENTION DEFICIT DISORDER, UNSPECIFIED HYPERACTIVITY PRESENCE: Primary | ICD-10-CM

## 2022-02-16 DIAGNOSIS — F84.0 AUTISM: ICD-10-CM

## 2022-02-16 PROCEDURE — 99441 PR PHYSICIAN TELEPHONE EVALUATION 5-10 MIN: CPT | Performed by: NURSE PRACTITIONER

## 2022-02-16 RX ORDER — ATOMOXETINE 18 MG/1
18 CAPSULE ORAL DAILY
Qty: 30 CAPSULE | Refills: 1 | Status: SHIPPED | OUTPATIENT
Start: 2022-02-16 | End: 2022-03-24 | Stop reason: DRUGHIGH
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
## 2022-02-16 NOTE — PATIENT INSTRUCTIONS
-Increase Strattera to 18 mg daily for ADHD.  Monitor for palpitation.    Your next appointment is scheduled on 3/14/2022 (Thu) at 2pm.    Thank you for coming to the [facility] [place] [facility].    Lab Testing:  If you had lab testing today and your results are reassuring or normal they will be mailed to you or sent through [employer] within 7 days. If the lab tests need quick action we will call you with the results. The phone number we will call with results is # [phone] (home) . If this is not the best number please call our clinic and change the number.    Medication Refills:  If you need any refills please call your pharmacy and they will contact us. Our fax number for refills is [phone]. Please allow three business for refill processing. If you need to  your refill at a new pharmacy, please contact the new pharmacy directly. The new pharmacy will help you get your medications transferred.     Scheduling:  If you have any concerns about today's visit or wish to schedule another appointment please call our office during normal business hours [phone] (8-5:00 M-F)    Contact Us:  Please call [phone] during business hours (8-5:00 M-F).  If after clinic hours, or on the weekend, please call  [phone].    Financial Assistance [phone]  [facility] Billing [phone]  [place] Billing Office, [facility]: [phone]  [facility] Billing [phone]  Medical Records [phone]      MENTAL HEALTH CRISIS NUMBERS:  For a medical emergency please call  911 or go to the nearest ER.     [facility]:   [facility] -[phone]   Crisis Residence [facility] Residence -[phone]   Walk-In Counseling Center [facility] -[phone]   COPE 24/7 [facility] Mobile Team -[phone] (adults)/[phone] (child)  CHILD: [facility] needs assessment team - [phone]      [facility]:   [facility] - [phone]   Walk-in counseling  [facility] - [phone]   Walk-in counseling [facility] - [phone]   Crisis Residence [facility] Residence - [phone]  Urgent Care Adult Mental [facility]-[phone] mobile unit/ 24/7 crisis line    National Crisis Numbers:   National Suicide Prevention Lifeline: 7-287-431-TALK (138-997-9501)  Poison Control Center - 8-409-311-5026  [employer]/resources for a list of additional resources (SOS)  Trans Lifeline a hotline for transgender people 5-007-717-9192  [employer] a hotline for LGBT youth 1-164.145.4170  Crisis Text Line: For any crisis 24/7   To: 029125  see www.crisistextline.org  - IF MAKING A CALL FEELS TOO HARD, send a text!         Again thank you for choosing [facility] [place] [facility] and please let us know how we can best partner with you to improve you and your family's health.    You may be receiving a survey regarding this appointment. We would love to have your feedback, both positive and negative. The survey is done by an external company, so your answers are anonymous.

## 2022-02-21 ENCOUNTER — MYC MEDICAL ADVICE (OUTPATIENT)
Dept: FAMILY MEDICINE | Facility: CLINIC | Age: 27
End: 2022-02-21
Payer: COMMERCIAL

## 2022-02-21 DIAGNOSIS — F64.9 GENDER DYSPHORIA: Primary | ICD-10-CM

## 2022-03-24 ENCOUNTER — VIRTUAL VISIT (OUTPATIENT)
Dept: PSYCHIATRY | Facility: CLINIC | Age: 27
End: 2022-03-24
Attending: NURSE PRACTITIONER
Payer: COMMERCIAL

## 2022-03-24 DIAGNOSIS — F98.8 ATTENTION DEFICIT DISORDER, UNSPECIFIED HYPERACTIVITY PRESENCE: Primary | ICD-10-CM

## 2022-03-24 PROCEDURE — 99441 PR PHYSICIAN TELEPHONE EVALUATION 5-10 MIN: CPT | Performed by: NURSE PRACTITIONER

## 2022-03-24 RX ORDER — ATOMOXETINE 25 MG/1
25 CAPSULE ORAL DAILY
Qty: 90 CAPSULE | Refills: 0 | Status: SHIPPED | OUTPATIENT
Start: 2022-03-24 | End: 2022-06-09

## 2022-03-24 NOTE — PROGRESS NOTES
Silvia Fritz is a 27 year old who has consented to receive services via billable phone visit.      What phone number would you prefer to be contacted at?: Preferred phone number on file: [phone]  How would you prefer to obtain AVS?: Dorene
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
## 2022-03-24 NOTE — PATIENT INSTRUCTIONS
-Increase Strattera to 25 mg daily for ADHD.    Your next appointment is scheduled on 6/9/2022 (Thu) at 1:30pm.      **For crisis resources, please see the information at the end of this document**   Patient Education    Thank you for coming to the [facility] [place] [facility].    Lab Testing:  If you had lab testing today and your results are reassuring or normal they will be mailed to you or sent through [employer] within 7 days. If the lab tests need quick action we will call you with the results. The phone number we will call with results is # [phone]. If this is not the best number please call our clinic and change the number.     Medication Refills:  If you need any refills please call your pharmacy and they will contact us. Our fax number for refills is [phone]. Please allow three business days for refill processing.   If you need to change to a different pharmacy, please contact the new pharmacy directly. The new pharmacy will help you get your medications transferred.     Contact Us:  Please call [phone] during business hours (8-5:00 M-F).  If you have medication related questions after clinic hours, or on the weekend, please call [phone].    Financial Assistance [phone]  Medical Records [phone]       MENTAL HEALTH CRISIS RESOURCES:  For a emergency help, please call 911 or go to the nearest Emergency Department.     Emergency Walk-In Options:   [facility] Unit @ [place] ([place]): [phone] - Specialized mental health emergency area designed to be calming  [facility] ([place]): [phone]  [facility] ([place]): [phone]  [facility]): [phone]    [facility] Crisis Information:   [place]: [phone]  [place]: [phone]  [place] ([place]) - Adult: [phone]     Child: [phone]  [place] - Adult: [phone]     Child: [phone]  [place]:  [phone]  List of all [facility] resources:   https://[place].gov/dhs/people-we-serve/adults/health-care/mental-health/resources/crisis-contacts.jsp    National Crisis Information:   Crisis Text Line: Text  [place]  to 404192  National Suicide Prevention Lifeline: 0-266-580-TALK (1-338.888.9744)       For online chat options, visit https://suicidepreventionlifeline.org/chat/  Poison Control Center: 4-185-806-4591  Trans Lifeline: 3-044-940-6607 - Hotline for transgender people of all ages  [employer]: 2-403-518-5240 - Hotline for LGBT youth     For Non-Emergency Support:   Fast Tracker: Mental Health & Substance Use Disorder Resources -   https://www.Z-goodn.org/

## 2022-04-29 DIAGNOSIS — F64.9 GENDER DYSPHORIA: ICD-10-CM

## 2022-04-29 NOTE — TELEPHONE ENCOUNTER

## 2022-05-02 RX ORDER — TESTOSTERONE CYPIONATE 200 MG/ML
60 INJECTION, SOLUTION INTRAMUSCULAR WEEKLY
Qty: 4 ML | Refills: 3 | Status: SHIPPED | OUTPATIENT
Start: 2022-05-02 | End: 2022-12-07

## 2022-05-22 ENCOUNTER — HEALTH MAINTENANCE LETTER (OUTPATIENT)
Age: 27
End: 2022-05-22

## 2022-06-09 ENCOUNTER — VIRTUAL VISIT (OUTPATIENT)
Dept: PSYCHIATRY | Facility: CLINIC | Age: 27
End: 2022-06-09
Attending: NURSE PRACTITIONER
Payer: COMMERCIAL

## 2022-06-09 DIAGNOSIS — F98.8 ATTENTION DEFICIT DISORDER, UNSPECIFIED HYPERACTIVITY PRESENCE: Primary | ICD-10-CM

## 2022-06-09 PROCEDURE — 99213 OFFICE O/P EST LOW 20 MIN: CPT | Mod: TEL | Performed by: NURSE PRACTITIONER

## 2022-06-09 RX ORDER — ATOMOXETINE 25 MG/1
25 CAPSULE ORAL DAILY
Qty: 90 CAPSULE | Refills: 1 | Status: SHIPPED | OUTPATIENT
Start: 2022-06-09

## 2022-06-09 ASSESSMENT — PATIENT HEALTH QUESTIONNAIRE - PHQ9
SUM OF ALL RESPONSES TO PHQ QUESTIONS 1-9: 3
10. IF YOU CHECKED OFF ANY PROBLEMS, HOW DIFFICULT HAVE THESE PROBLEMS MADE IT FOR YOU TO DO YOUR WORK, TAKE CARE OF THINGS AT HOME, OR GET ALONG WITH OTHER PEOPLE: NOT DIFFICULT AT ALL
SUM OF ALL RESPONSES TO PHQ QUESTIONS 1-9: 3

## 2022-06-09 NOTE — PATIENT INSTRUCTIONS
-Continue on current medication regimen.    -Follow up in 3 months    **For crisis resources, please see the information at the end of this document**   Patient Education    Thank you for coming to the [facility] [place] [facility].     Lab Testing:  If you had lab testing today and your results are reassuring or normal they will be mailed to you or sent through [employer] within 7 days. If the lab tests need quick action we will call you with the results. The phone number we will call with results is # [phone]. If this is not the best number please call our clinic and change the number.     Medication Refills:  If you need any refills please call your pharmacy and they will contact us. Our fax number for refills is [phone].   Three business days of notice are needed for general medication refill requests.   Five business days of notice are needed for controlled substance refill requests.   If you need to change to a different pharmacy, please contact the new pharmacy directly. The new pharmacy will help you get your medications transferred.     Contact Us:  Please call [phone] during business hours (8-5:00 M-F).   If you have medication related questions after clinic hours, or on the weekend, please call [phone].     Financial Assistance [phone]   Medical Records [phone]       MENTAL HEALTH CRISIS RESOURCES:  For a emergency help, please call 911 or go to the nearest Emergency Department.     Emergency Walk-In Options:   [facility] Unit @ [facility] ([place]): [phone] - Specialized mental health emergency area designed to be calming  [facility] ([place]): [phone]  [facility] ([place]): [phone]  [facility]): [phone]    [facility] Crisis Information:   [place]: [phone]  [place]: [phone]  [place] ([place]) - Adult: [phone]     Child: [phone]  [place] -  Adult: [phone]     Child: [phone]  [place]: [phone]  List of all [facility] resources:   https://[place].gov/dhs/people-we-serve/adults/health-care/mental-health/resources/crisis-contacts.jsp    National Crisis Information:   Crisis Text Line: Text  [place]  to 961831  National Suicide Prevention Lifeline: 2-121-874-TALK (1-632.485.8583)       For online chat options, visit https://suicidepreventionlifeline.org/chat/  Poison Control Center: 7-797-093-7501  Trans Lifeline: 3-564-894-1992 - Hotline for transgender people of all ages  The [employer]: 5-965-887-0901 - Hotline for LGBT youth     For Non-Emergency Support:   Fast Tracker: Mental Health & Substance Use Disorder Resources -   https://www.[employer].org/

## 2022-06-23 DIAGNOSIS — F64.9 GENDER DYSPHORIA: ICD-10-CM

## 2022-06-23 RX ORDER — SYRINGE, DISPOSABLE, 1 ML
SYRINGE, EMPTY DISPOSABLE MISCELLANEOUS
Qty: 25 EACH | Refills: 11 | Status: SHIPPED | OUTPATIENT
Start: 2022-06-23 | End: 2023-07-06

## 2022-06-23 NOTE — TELEPHONE ENCOUNTER
"Request for medication refill:  syringe, disposable, (B-D SYRINGE LUER-TOMY) 1 ML MISC    Providers if patient needs an appointment and you are willing to give a one month supply please refill for one month and  send a letter/MyChart using \".SMILLIMITEDREFILL\" .smillimited and route chart to \"P SMI \" (Giving one month refill in non controlled medications is strongly recommended before denial)    If refill has been denied, meaning absolutely no refills without visit, please complete the smart phrase \".smirxrefuse\" and route it to the \"P SMI MED REFILLS\"  pool to inform the patient and the pharmacy.    Mahi Tinsley MA        "

## 2022-08-16 ENCOUNTER — TELEPHONE (OUTPATIENT)
Dept: FAMILY MEDICINE | Facility: CLINIC | Age: 27
End: 2022-08-16

## 2022-08-16 NOTE — TELEPHONE ENCOUNTER
Prior Authorization Retail Medication Request    Medication/Dose: testosterone cypionate (DEPOTESTOSTERONE) 200 MG/ML injection  ICD code (if different than what is on RX):  Gender dysphoria [F64.9]   Previously Tried and Failed:  See chart  Rationale:  See chart    Insurance Name:  [email or web address]  Insurance ID:  [id number]      Pharmacy Information (if different than what is on RX)  Name:  [facility]  Phone:  [phone]

## 2022-08-17 NOTE — TELEPHONE ENCOUNTER
Prior Authorization Approval    Authorization Effective Date: 7/18/2022  Authorization Expiration Date: 8/17/2023  Medication: testosterone cypionate (DEPOTESTOSTERONE) 200 MG/ML injection-APPROVED  Approved Dose/Quantity:   Reference #:     Insurance Company: [employer] - Phone [phone] Fax [phone]  Expected CoPay:       CoPay Card Available:      Foundation Assistance Needed:    Which Pharmacy is filling the prescription (Not needed for infusion/clinic administered): [id number] - [place], [place] - [address]  Pharmacy Notified: Yes  Patient Notified: No    **Instructed pharmacy to notify patient when script is ready to /ship.**

## 2022-08-17 NOTE — TELEPHONE ENCOUNTER
Central Prior Authorization Team   Phone: [phone]      PA Initiation    Medication: testosterone cypionate (DEPOTESTOSTERONE) 200 MG/ML injection  Insurance Company: [employer] - Phone [phone] Fax [phone]  Pharmacy Filling the Rx: [id number] - [place], [place] - [address]  Filling Pharmacy Phone: [phone]  Filling Pharmacy Fax:    Start Date: 8/17/2022

## 2022-09-18 ENCOUNTER — HEALTH MAINTENANCE LETTER (OUTPATIENT)
Age: 27
End: 2022-09-18

## 2022-12-06 DIAGNOSIS — F64.9 GENDER DYSPHORIA: ICD-10-CM

## 2022-12-06 NOTE — TELEPHONE ENCOUNTER
"Request for medication refill:  testosterone cypionate (DEPOTESTOSTERONE) 200 MG/ML injection    Providers if patient needs an appointment and you are willing to give a one month supply please refill for one month and  send a letter/MyChart using \".SMILLIMITEDREFILL\" .smillimited and route chart to \"P SMI \" (Giving one month refill in non controlled medications is strongly recommended before denial)    If refill has been denied, meaning absolutely no refills without visit, please complete the smart phrase \".smirxrefuse\" and route it to the \"P SMI MED REFILLS\"  pool to inform the patient and the pharmacy.    Juice Mcfarlane MA        "

## 2022-12-07 RX ORDER — TESTOSTERONE CYPIONATE 200 MG/ML
60 INJECTION, SOLUTION INTRAMUSCULAR WEEKLY
Qty: 4 ML | Refills: 3 | Status: SHIPPED | OUTPATIENT
Start: 2022-12-07 | End: 2023-08-31

## 2023-03-22 DIAGNOSIS — F64.9 GENDER DYSPHORIA: ICD-10-CM

## 2023-03-22 NOTE — TELEPHONE ENCOUNTER
"Request for medication refill:  needle, disp, 18G X 1\" MISC    Providers if patient needs an appointment and you are willing to give a one month supply please refill for one month and  send a letter/MyChart using \".SMILLIMITEDREFILL\" .smillimited and route chart to \"P SMI \" (Giving one month refill in non controlled medications is strongly recommended before denial)    If refill has been denied, meaning absolutely no refills without visit, please complete the smart phrase \".smirxrefuse\" and route it to the \"P SMI MED REFILLS\"  pool to inform the patient and the pharmacy.    Juice Mcfarlane MA        "

## 2023-04-04 DIAGNOSIS — F64.9 GENDER DYSPHORIA: ICD-10-CM

## 2023-04-04 RX ORDER — NEEDLES, DISPOSABLE 25GX5/8"
NEEDLE, DISPOSABLE MISCELLANEOUS
Qty: 40 EACH | Refills: 1 | Status: SHIPPED | OUTPATIENT
Start: 2023-04-04 | End: 2023-09-26

## 2023-05-26 ENCOUNTER — TELEPHONE (OUTPATIENT)
Dept: FAMILY MEDICINE | Facility: CLINIC | Age: 28
End: 2023-05-26
Payer: COMMERCIAL

## 2023-05-26 DIAGNOSIS — F64.9 GENDER DYSPHORIA: Primary | ICD-10-CM

## 2023-05-26 NOTE — TELEPHONE ENCOUNTER
"Request for medication refill:  Needle, Disp, 25G X 5/8\" [facility]  Pharmacy request: Walker 25GX1 Hypo.   Please review.    Providers if patient needs an appointment and you are willing to give a one month supply please refill for one month and  send a letter/MyChart using \".SMILLIMITEDREFILL\" .smillimited and route chart to \"P SMI \" (Giving one month refill in non controlled medications is strongly recommended before denial)    If refill has been denied, meaning absolutely no refills without visit, please complete the smart phrase \".smirxrefuse\" and route it to the \"P SMI MED REFILLS\"  pool to inform the patient and the pharmacy.    Estefania Hale MA        "

## 2023-06-04 ENCOUNTER — HEALTH MAINTENANCE LETTER (OUTPATIENT)
Age: 28
End: 2023-06-04

## 2023-07-05 DIAGNOSIS — F64.9 GENDER DYSPHORIA: ICD-10-CM

## 2023-07-05 RX ORDER — SYRINGE, DISPOSABLE, 1 ML
SYRINGE, EMPTY DISPOSABLE MISCELLANEOUS
OUTPATIENT
Start: 2023-07-05

## 2023-08-08 ENCOUNTER — OFFICE VISIT (OUTPATIENT)
Dept: FAMILY MEDICINE | Facility: CLINIC | Age: 28
End: 2023-08-08
Payer: OTHER MISCELLANEOUS

## 2023-08-08 VITALS
DIASTOLIC BLOOD PRESSURE: 72 MMHG | TEMPERATURE: 98.3 F | WEIGHT: 112.6 LBS | RESPIRATION RATE: 18 BRPM | HEART RATE: 93 BPM | HEIGHT: 64 IN | OXYGEN SATURATION: 97 % | BODY MASS INDEX: 19.22 KG/M2 | SYSTOLIC BLOOD PRESSURE: 107 MMHG

## 2023-08-08 DIAGNOSIS — Z01.818 PREOPERATIVE EXAMINATION: Primary | ICD-10-CM

## 2023-08-08 LAB
ERYTHROCYTE [DISTWIDTH] IN BLOOD BY AUTOMATED COUNT: 12.7 % (ref 10–15)
HCT VFR BLD AUTO: 47.1 % (ref 35–53)
HGB BLD-MCNC: 15.4 G/DL (ref 11.7–17.7)
MCH RBC QN AUTO: 30.8 PG (ref 26.5–33)
MCHC RBC AUTO-ENTMCNC: 32.7 G/DL (ref 31.5–36.5)
MCV RBC AUTO: 94 FL (ref 78–100)
PLATELET # BLD AUTO: 307 10E3/UL (ref 150–450)
RBC # BLD AUTO: 5 10E6/UL (ref 3.8–5.9)
WBC # BLD AUTO: 6.7 10E3/UL (ref 4–11)

## 2023-08-08 PROCEDURE — 85027 COMPLETE CBC AUTOMATED: CPT | Mod: ORL | Performed by: NURSE PRACTITIONER

## 2023-08-08 ASSESSMENT — PAIN SCALES - GENERAL: PAINLEVEL: NO PAIN (0)

## 2023-08-08 NOTE — PATIENT INSTRUCTIONS
Preoperative exam  Nothing by mouth 10 hours before surgery  Hold strattera until after surgery  Okay for surgery  CBC today    Health maintenance  Will need Tdap booster after surgery  Schedule dental

## 2023-08-08 NOTE — NURSING NOTE
"[facility]    Preferred Name: Isaiah     How did you hear about us?  Other - Referred by another clinic    28 year old  Chief Complaint   Patient presents with     Pre-Op Exam       Blood pressure 107/72, pulse 93, temperature 98.3  F (36.8  C), temperature source Oral, resp. rate 18, height 1.636 m (5' 4.4\"), weight 51.1 kg (112 lb 9.6 oz), SpO2 97 %, not currently breastfeeding. Body mass index is 19.09 kg/m .  BP completed using cuff size:        Patient Active Problem List   Diagnosis     Anxiety     ADHD, predominantly inattentive type     Asperger's syndrome     Gender dysphoria     Moderate recurrent major depression (H)     Gender dysphoria in adolescent and adult       Wt Readings from Last 2 Encounters:   08/08/23 51.1 kg (112 lb 9.6 oz)   02/08/22 52.1 kg (114 lb 12.8 oz)     BP Readings from Last 3 Encounters:   08/08/23 107/72   02/08/22 102/67   10/26/21 103/65       Allergies   Allergen Reactions     Dairy Products [Milk Protein]      Egg [Chicken-Derived Products (Egg)]        Current Outpatient Medications   Medication     atomoxetine (STRATTERA) 25 MG capsule     B-D SYRINGE LUER-TOMY 1 ML MISC     BD HYPODERMIC NEEDLE 18G X 1\" MISC     ibuprofen (ADVIL/MOTRIN) 100 MG tablet     Needle, Disp, 25G X 1-1/2\" MISC     Needle, Disp, 25G X 5/8\" MISC     testosterone cypionate (DEPOTESTOSTERONE) 200 MG/ML injection     No current facility-administered medications for this visit.       Social History     Tobacco Use     Smoking status: Never     Smokeless tobacco: Never   Substance Use Topics     Alcohol use: Never     Drug use: Never       Honoring Choices - Health Care Directive Guide offered to patient at time of visit.    Health Maintenance Due   Topic Date Due     YEARLY PREVENTIVE VISIT  Never done     ADVANCE CARE PLANNING  Never done     DEPRESSION ACTION PLAN  Never done     HEPATITIS B IMMUNIZATION (1 of 3 - 3-dose series) Never done     HIV SCREENING  Never done     HEPATITIS " C SCREENING  Never done     PAP  Never done     DTAP/TDAP/TD IMMUNIZATION (7 - Td or Tdap) 08/29/2017     MENTAL HEALTH TX PLAN  01/18/2022     GWEN ASSESSMENT  01/20/2022     COVID-19 Vaccine (4 - Pfizer series) 04/05/2022     PHQ-9  12/09/2022       Immunization History   Administered Date(s) Administered     COVID-19 MONOVALENT 12+ (Pfizer) 06/01/2021, 06/22/2021     COVID-19 Monovalent 12+ (Pfizer 2022) 02/08/2022     DTAP (<7y) 06/13/1996, 07/05/2000     DTP-Hib 1995, 1995, 1995     HIB (PRP-T) 03/07/1996     HPV Quadrivalent 08/29/2007, 11/08/2007, 08/22/2008     MMR 03/07/1996, 07/05/2000, 11/08/2007     Meningococcal (Menomune ) 08/29/2007     Meningococcal ACWY (Menactra ) 08/29/2007     OPV, trivalent, live 1995, 1995, 06/13/1996, 06/19/2000     TDAP Vaccine (Boostrix) 08/29/2007     Varicella 03/13/1997       No results found for: PAP    Recent Labs   Lab Test 02/08/22  0854 09/03/21  0759 08/31/21  1146 05/11/21  1053 02/02/21  1302 11/03/20  1626   LDL 96 87  --  95  --  97   HDL 45 41  --  41*  --  49.5   TRIG 53 68  --  68  --  61.3   ALT 16  --  16 21 3.0 6.4   CR 0.83  --   --   --  0.8 0.8   GFRESTIMATED >90  --   --   --  >90 >90   GFRESTBLACK  --   --   --   --  >90 >90   ALBUMIN 3.7  --  4.0 3.8  --   --    POTASSIUM 3.9  --   --   --  3.8 4.4           8/8/2023     9:26 AM 2/8/2022     7:57 AM   PHQ-2 ( 1999 Pfizer)   Q1: Little interest or pleasure in doing things 1 0   Q2: Feeling down, depressed or hopeless 1 0   PHQ-2 Score 2 0           10/26/2020    10:20 AM 11/24/2020     3:03 PM 1/19/2021     3:42 PM 6/9/2022     1:18 PM   PHQ-9 SCORE   PHQ-9 Total Score MyChart    3 (Minimal depression)   PHQ-9 Total Score 3 3 8 3           10/26/2020    10:20 AM 11/24/2020     3:03 PM 1/19/2021     3:42 PM   GWEN-7 SCORE   Total Score 3 3 2            No data to display                Raegan Newton, EMT    August 8, 2023 9:28 AM

## 2023-08-08 NOTE — PROGRESS NOTES
[facility] [address] [address]  Phone: [phone]  Fax: [phone]  Primary Provider: Rama Hernandez  Pre-op Performing Provider: LAINEY PEDRO      PREOPERATIVE EVALUATION:  Today's date: 8/8/2023    Silvia Fritz is a 28 year old adult who presents for a preoperative evaluation.       No data to display                  Surgical Information:  Surgery/Procedure: OPEN REDUCTION INTERNAL FIXATION RADIUS DISTAL    [place] OPTX DSTL RADL X-ARTIC FX/EPIPHYSL SEP    Surgery Location: [facility]  Surgeon: Nikolas Jimenez MD   Surgery Date: 08/09/2023   Time of Surgery: unknown  Where patient plans to recover: At home with family  Fax number for surgical facility: [phone]     Assessment & Plan     The proposed surgical procedure is considered INTERMEDIATE risk.    Problem List Items Addressed This Visit    None  Visit Diagnoses     Preoperative examination    -  Primary    Relevant Orders    CBC with platelets                 Risks and Recommendations:  The patient has the following additional risks and recommendations for perioperative complications:   - patient on testosterone therapy. Research does not show increased risk although has been previously associated with bleeding and/or cloitting disorders. Has been on testosterone for 3years. Mastectomy 2 years ago while on testosterone. Minimal complications bleeding secondary to not stopping ibuprofen by history. Has also experienced PONV in the past.     Antiplatelet or Anticoagulation Medication Instructions:  Not on any specific antiplatelets or anticoagulants.  Off ibuprofen currently    Additional Medication Instructions:  Hold meds day of surgery. Testosterone next due 8/10/23. NPO 10-12 hours preop    RECOMMENDATION:  APPROVAL GIVEN to proceed with proposed procedure, without further diagnostic evaluation.    28 year-old receiving testosterone therapy for gender dysphoria. Has  had top surgery. Contemplating bottom surgery in future. History ADHD, depression and anxiety, currently well controlled. Poor dentition.     Subjective       HPI related to upcoming procedure: 2 months ago broke left wrist trying to stop a cart at work from hitting wall. Was splinted and then casted, but  Healed with deformity. Requires open fixation.  Denies numbness or tingling. There is some swelling that persists. Wearing splint. Notices impairment of mobility.           8/8/2023     9:22 AM   Preop Questions   1. Have you ever had a heart attack or stroke? No   2. Have you ever had surgery on your heart or blood vessels, such as a stent placement, a coronary artery bypass, or surgery on an artery in your head, neck, heart, or legs? No   3. Do you have chest pain with activity? No   4. Do you have a history of  heart failure? No   5. Do you currently have a cold, bronchitis or symptoms of other infection? No   6. Do you have a cough, shortness of breath, or wheezing? No   7. Do you or anyone in your family have previous history of blood clots? No   8. Do you or does anyone in your family have a serious bleeding problem such as prolonged bleeding following surgeries or cuts? No   9. Have you ever had problems with anemia or been told to take iron pills? No   10. Have you had any abnormal blood loss such as black, tarry or bloody stools, or abnormal vaginal bleeding? No   11. Have you ever had a blood transfusion? No   12. Are you willing to have a blood transfusion if it is medically needed before, during, or after your surgery? Yes   13. Have you or any of your relatives ever had problems with anesthesia? No   14. Do you have sleep apnea, excessive snoring or daytime drowsiness? No   15. Do you have any artifical heart valves or other implanted medical devices like a pacemaker, defibrillator, or continuous glucose monitor? No   16. Do you have artificial joints? No   17. Are you allergic to latex? No   18. Is  there any chance that you may be pregnant? No     Health Care Directive:  Patient does not have a Health Care Directive or Living Will: Discussed advance care planning with patient; however, patient declined at this time.    Preoperative Review of :   reviewed - on testosterone   Narx Scores  Narcotic  000  Sedative  000  Stimulant  000  Explanation and Guidance  Overdose Risk Score  000  (Range 000-999)  Explanation and Guidance  State Indicators (0)      Review of Systems  CONSTITUTIONAL: NEGATIVE for fever, chills, change in weight  INTEGUMENTARY/SKIN: NEGATIVE for worrisome rashes, moles or lesions  EYES: NEGATIVE for vision changes or irritation  ENT/MOUTH: NEGATIVE for ear, mouth and throat problems  RESP: NEGATIVE for significant cough or SOB  CV: NEGATIVE for chest pain, palpitations or peripheral edema  GI: NEGATIVE for nausea, abdominal pain, heartburn, or change in bowel habits  : NEGATIVE for frequency, dysuria, or hematuria  MUSCULOSKELETAL: NEGATIVE for significant arthralgias or myalgia  NEURO: NEGATIVE for weakness, dizziness or paresthesias  ENDOCRINE: NEGATIVE for temperature intolerance, skin/hair changes  HEME: NEGATIVE for bleeding problems  PSYCHIATRIC: NEGATIVE for changes in mood or affect    Patient Active Problem List    Diagnosis Date Noted     Gender dysphoria in adolescent and adult 05/05/2021     Priority: Medium     Added automatically from request for surgery [id number]       ADHD, predominantly inattentive type 01/07/2020     Priority: Medium     Anxiety 10/02/2019     Priority: Medium     Gender dysphoria 10/02/2019     Priority: Medium     Moderate recurrent major depression (H) 10/02/2019     Priority: Medium     Asperger's syndrome 10/15/2010     Priority: Medium      Past Medical History:   Diagnosis Date     ADHD (attention deficit hyperactivity disorder)      Anxiety      Depressive disorder      Gender dysphoria      Past Surgical History:   Procedure Laterality Date      "Surgery of urinary tract      as toddler     TRANSGENDER MASTECTOMY Bilateral 9/15/2021    Procedure: bilateral simple mastectomy, possible nipple grafts. OnQ;  Surgeon: Ani Warren MD;  Location: [facility] OR     Current Outpatient Medications   Medication Sig Dispense Refill     atomoxetine (STRATTERA) 25 MG capsule Take 1 capsule (25 mg) by mouth daily 90 capsule 1     B-D SYRINGE LUER-TOMY 1 ML MISC TO USE WITH WEEKLY INJECTION 25 each 3     BD HYPODERMIC NEEDLE 18G X 1\" MISC USE FOR WEEKLY TESTOSTERONE INJECTION 40 each 1     ibuprofen (ADVIL/MOTRIN) 100 MG tablet Take 100 mg by mouth every 4 hours as needed       Needle, Disp, 25G X 1-1/2\" MISC 1 each once a week 15 each 6     Needle, Disp, 25G X 5/8\" MISC Use to inject testosterone weekly. 25 each 11     testosterone cypionate (DEPOTESTOSTERONE) 200 MG/ML injection Inject 0.3 mLs (60 mg) Subcutaneous once a week 4 mL 3       Allergies   Allergen Reactions     Dairy Products [Milk Protein]      Egg [Chicken-Derived Products (Egg)]         Social History     Tobacco Use     Smoking status: Never     Smokeless tobacco: Never   Substance Use Topics     Alcohol use: Never     Family History   Problem Relation Age of Onset     Obesity Mother      Depression Mother      Obesity Father      Cancer Father         in head and neck     Diabetes Type 1 Sister      Diabetes Maternal Grandfather      Diabetes Type 2  Paternal Grandmother      Obesity Paternal Grandfather      Hypertension No family hx of      History   Drug Use Unknown         Objective     There were no vitals taken for this visit.    Physical Exam    GENERAL APPEARANCE: healthy, alert and no distress     EYES: EOMI, PERRL     HENT: ear canals and TM's normal and nose and mouth without ulcers or lesions     NECK: no adenopathy, no asymmetry, masses, or scars and thyroid normal to palpation     RESP: lungs clear to auscultation - no rales, rhonchi or wheezes     CV: regular rates and rhythm, normal S1 " S2, no S3 or S4 and no murmur, click or rub     ABDOMEN:  soft, nontender, no HSM or masses and bowel sounds normal     MS: extremities normal- no gross deformities noted, no evidence of inflammation in joints, FROM in all extremities.     SKIN: no suspicious lesions or rashes     SKIN: male pattern hair growth face, trunk     NEURO: Normal strength and tone, sensory exam grossly normal, mentation intact and speech normal     PSYCH: mentation appears normal. and affect normal/bright     LYMPHATICS: No cervical adenopathy    Recent Labs   Lab Test 02/08/22  0854 08/31/21  1146   HGB 14.4 15.3     --    POTASSIUM 3.9  --    CR 0.83  --         Diagnostics:  Labs pending at this time.  Results will be reviewed when available. CBC  No EKG required, no history of coronary heart disease, significant arrhythmia, peripheral arterial disease or other structural heart disease.    Revised Cardiac Risk Index (RCRI):  The patient has the following serious cardiovascular risks for perioperative complications:   - No serious cardiac risks = 0 points     RCRI Interpretation: 0 points: Class I (very low risk - 0.4% complication rate)    Is on testosterone.      Did not do creatinine today. Last documented 2/2022 Creatinine 0.83 and eGFR>90  Signed Electronically by: ANA Rankin CNP  Copy of this evaluation report is provided to requesting physician.

## 2023-08-29 DIAGNOSIS — F64.9 GENDER DYSPHORIA: ICD-10-CM

## 2023-08-31 RX ORDER — TESTOSTERONE CYPIONATE 200 MG/ML
INJECTION, SOLUTION INTRAMUSCULAR
Qty: 4 ML | Refills: 0 | Status: SHIPPED | OUTPATIENT
Start: 2023-08-31 | End: 2023-09-26

## 2023-08-31 NOTE — TELEPHONE ENCOUNTER
8/31/23 Care Coordinator contacted patient and scheduled appointment with  on 9/26/23 @ 11:00a.shantel Mendez  Care Coordinator  [facility]  [phone]

## 2023-09-26 ENCOUNTER — OFFICE VISIT (OUTPATIENT)
Dept: FAMILY MEDICINE | Facility: CLINIC | Age: 28
End: 2023-09-26
Payer: COMMERCIAL

## 2023-09-26 VITALS
SYSTOLIC BLOOD PRESSURE: 112 MMHG | DIASTOLIC BLOOD PRESSURE: 76 MMHG | HEART RATE: 66 BPM | BODY MASS INDEX: 19.29 KG/M2 | TEMPERATURE: 98.2 F | RESPIRATION RATE: 16 BRPM | HEIGHT: 64 IN | WEIGHT: 113 LBS | OXYGEN SATURATION: 99 %

## 2023-09-26 DIAGNOSIS — F33.1 MODERATE RECURRENT MAJOR DEPRESSION (H): ICD-10-CM

## 2023-09-26 DIAGNOSIS — F64.9 GENDER DYSPHORIA: Primary | ICD-10-CM

## 2023-09-26 DIAGNOSIS — R53.83 OTHER FATIGUE: ICD-10-CM

## 2023-09-26 DIAGNOSIS — F84.5 ASPERGER'S SYNDROME: ICD-10-CM

## 2023-09-26 PROBLEM — S52.501K: Status: ACTIVE | Noted: 2023-08-09

## 2023-09-26 LAB
ALBUMIN SERPL BCG-MCNC: 4.5 G/DL (ref 3.5–5.2)
ALP SERPL-CCNC: 94 U/L (ref 35–129)
ALT SERPL W P-5'-P-CCNC: <5 U/L (ref 0–70)
ANION GAP SERPL CALCULATED.3IONS-SCNC: 12 MMOL/L (ref 7–15)
AST SERPL W P-5'-P-CCNC: 18 U/L (ref 0–45)
BILIRUB SERPL-MCNC: 1.1 MG/DL
BUN SERPL-MCNC: 9.5 MG/DL (ref 6–20)
CALCIUM SERPL-MCNC: 9.4 MG/DL (ref 8.6–10)
CHLORIDE SERPL-SCNC: 102 MMOL/L (ref 98–107)
CHOLEST SERPL-MCNC: 164 MG/DL
CREAT SERPL-MCNC: 0.98 MG/DL (ref 0.51–1.17)
DEPRECATED HCO3 PLAS-SCNC: 25 MMOL/L (ref 22–29)
EGFRCR SERPLBLD CKD-EPI 2021: 80 ML/MIN/1.73M2
ERYTHROCYTE [DISTWIDTH] IN BLOOD BY AUTOMATED COUNT: 12.7 % (ref 10–15)
ESTRADIOL SERPL-MCNC: 47 PG/ML
GLUCOSE SERPL-MCNC: 79 MG/DL (ref 70–99)
HCT VFR BLD AUTO: 44.9 % (ref 35–53)
HDLC SERPL-MCNC: 39 MG/DL
HGB BLD-MCNC: 15.3 G/DL (ref 11.7–17.7)
LDLC SERPL CALC-MCNC: 111 MG/DL
MCH RBC QN AUTO: 31.6 PG (ref 26.5–33)
MCHC RBC AUTO-ENTMCNC: 34.1 G/DL (ref 31.5–36.5)
MCV RBC AUTO: 93 FL (ref 78–100)
NONHDLC SERPL-MCNC: 125 MG/DL
PLATELET # BLD AUTO: 273 10E3/UL (ref 150–450)
POTASSIUM SERPL-SCNC: 4.1 MMOL/L (ref 3.4–5.3)
PROT SERPL-MCNC: 7 G/DL (ref 6.4–8.3)
RBC # BLD AUTO: 4.84 10E6/UL (ref 3.8–5.9)
SODIUM SERPL-SCNC: 139 MMOL/L (ref 135–145)
TRIGL SERPL-MCNC: 72 MG/DL
TSH SERPL DL<=0.005 MIU/L-ACNC: 1.27 UIU/ML (ref 0.3–4.2)
WBC # BLD AUTO: 8 10E3/UL (ref 4–11)

## 2023-09-26 PROCEDURE — 84403 ASSAY OF TOTAL TESTOSTERONE: CPT

## 2023-09-26 PROCEDURE — 84443 ASSAY THYROID STIM HORMONE: CPT

## 2023-09-26 PROCEDURE — 82670 ASSAY OF TOTAL ESTRADIOL: CPT

## 2023-09-26 PROCEDURE — 36415 COLL VENOUS BLD VENIPUNCTURE: CPT

## 2023-09-26 PROCEDURE — 99214 OFFICE O/P EST MOD 30 MIN: CPT | Mod: GC

## 2023-09-26 PROCEDURE — 85027 COMPLETE CBC AUTOMATED: CPT

## 2023-09-26 PROCEDURE — 80061 LIPID PANEL: CPT

## 2023-09-26 PROCEDURE — 80053 COMPREHEN METABOLIC PANEL: CPT

## 2023-09-26 RX ORDER — TESTOSTERONE CYPIONATE 200 MG/ML
INJECTION, SOLUTION INTRAMUSCULAR
Qty: 4 ML | Refills: 0 | Status: SHIPPED | OUTPATIENT
Start: 2023-09-26 | End: 2023-12-28

## 2023-09-26 RX ORDER — OXYCODONE HYDROCHLORIDE 5 MG/1
TABLET ORAL
COMMUNITY
Start: 2023-08-09

## 2023-09-26 RX ORDER — NEEDLES, DISPOSABLE 25GX5/8"
NEEDLE, DISPOSABLE MISCELLANEOUS
Qty: 100 EACH | Refills: 3 | Status: SHIPPED | OUTPATIENT
Start: 2023-09-26

## 2023-09-26 RX ORDER — CEPHALEXIN 500 MG/1
CAPSULE ORAL
COMMUNITY
Start: 2023-08-09

## 2023-09-26 RX ORDER — SYRINGE, DISPOSABLE, 1 ML
SYRINGE, EMPTY DISPOSABLE MISCELLANEOUS
Qty: 100 EACH | Refills: 3 | Status: SHIPPED | OUTPATIENT
Start: 2023-09-26

## 2023-09-26 RX ORDER — ACETAMINOPHEN 500 MG
1000 TABLET ORAL
COMMUNITY
Start: 2023-08-09

## 2023-09-26 ASSESSMENT — PAIN SCALES - GENERAL: PAINLEVEL: NO PAIN (0)

## 2023-09-26 NOTE — PROGRESS NOTES
"       HPI   Isaiah is a 28 year old individual that uses pronouns He/Him/His/Himself that presents today for follow up of:  masculinizing hormone therapy.     Gender identity: male    Any special concerns today?  no current concerns    On hormones?  YES +++ Shot day of the week? Thursday      Due for labs?  Yes      +++ Refills of meds needed?  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 100 each 3    oxyCODONE (ROXICODONE) 5 MG tablet Take one-half to one tablet (2.5-5 mg) by mouth every 4 hours if " "needed for Pain.      syringe, disposable, (B-D SYRINGE LUER-TOMY) 1 ML MISC Use with injections once a week 100 each 3    testosterone cypionate (DEPOTESTOSTERONE) 200 MG/ML injection INJECT 0.3ML(60MG) SUBCUTANEOUSLY ONCE A WEEK 4 mL 0    atomoxetine (STRATTERA) 25 MG capsule Take 1 capsule (25 mg) by mouth daily 90 capsule 1    ibuprofen (ADVIL/MOTRIN) 100 MG tablet Take 100 mg by mouth every 4 hours as needed         History   Smoking Status    Never   Smokeless Tobacco    Never            Review of Systems:        General  Fat redistribution: YES- jaw line  Weight change: No HEENT  Voice change: YES     Cardiovascular (CV)  Chest Pains: No  Shortness of breath: No Chest  Decreased exercise tolerance:  No  Breast changes/development: N/A     Gastrointestinal (GI)  Abdominal pain: No  Change in appetite: No Skin  Acne or oily skin: YES  Change in hair: No     Genitourinary ()  Abnormal vaginal bleeding: No   Decreased spontaneous erections: N/A  Change in libido: No  New sexual partners: No Musculoskeletal  Leg pain or swelling: No     Psychiatric (Psych)  Depression: No  Anxiety/Panic: No  Mood:  \"fine\"       Isaiah was seen today for follow up.    Diagnoses and all orders for this visit:    Gender dysphoria  Total testosterone levels are therapeutic (612).  Estradiol levels are appropriately suppressed.  CBC and CMP are within normal limits.  Lipid panel showed mild elevation in LDL with a value of 111.  No need for medication.  Likely secondary to nonfasting sample.  Refilled testosterone, needles, and syringes.  -     Testosterone total; Future  -     Estradiol; Future  -     CBC with Platelets and Reflex to Iron Studies; Future  -     Comprehensive metabolic panel; Future  -     Lipid panel; Future  -     Needle, Disp, 25G X 5/8\" MISC; Use to inject testosterone weekly.  -     needle, disp, (BD HYPODERMIC NEEDLE) 18G X 1\" MISC; USE FOR WEEKLY TESTOSTERONE INJECTION  -     syringe, disposable, (B-D SYRINGE " LUER-TOMY) 1 ML MISC; Use with injections once a week  -     testosterone cypionate (DEPOTESTOSTERONE) 200 MG/ML injection; INJECT 0.3ML(60MG) SUBCUTANEOUSLY ONCE A WEEK  -     Testosterone total  -     Estradiol  -     CBC with Platelets and Reflex to Iron Studies  -     Comprehensive metabolic panel  -     Lipid panel    Moderate recurrent major depression (H)  Asperger's syndrome  Appears to be baseline.  Declined mental health referral.  PHQ-9 and GWEN-7 scores were both low.    Other fatigue  Fatigue could potentially be related to his current schedule.  TSH was within normal limits, making thyroid dysfunction unlikely.  -     TSH with free T4 reflex; Future  -     TSH with free T4 reflex

## 2023-09-28 LAB — TESTOST SERPL-MCNC: 612 NG/DL (ref 8–950)

## 2023-10-03 NOTE — PATIENT INSTRUCTIONS
"Patient Education   Here is the plan from today's visit    1. Gender dysphoria  - Testosterone total; Future  - Estradiol; Future  - CBC with Platelets and Reflex to Iron Studies; Future  - Comprehensive metabolic panel; Future  - Lipid panel; Future  - Needle, Disp, 25G X 5/8\" MISC; Use to inject testosterone weekly.  Dispense: 100 each; Refill: 3  - needle, disp, (BD HYPODERMIC NEEDLE) 18G X 1\" MISC; USE FOR WEEKLY TESTOSTERONE INJECTION  Dispense: 100 each; Refill: 3  - syringe, disposable, (B-D SYRINGE LUER-TOMY) 1 ML MISC; Use with injections once a week  Dispense: 100 each; Refill: 3  - testosterone cypionate (DEPOTESTOSTERONE) 200 MG/ML injection; INJECT 0.3ML(60MG) SUBCUTANEOUSLY ONCE A WEEK  Dispense: 4 mL; Refill: 0  - Testosterone total  - Estradiol  - CBC with Platelets and Reflex to Iron Studies  - Comprehensive metabolic panel  - Lipid panel    2. Moderate recurrent major depression (H)    3. Asperger's syndrome    4. Other fatigue  - TSH with free T4 reflex; Future  - TSH with free T4 reflex    Please call or return to clinic if your symptoms don't go away.    Follow up plan  Return in about 1 year (around 9/26/2024).    Thank you for coming to [facility] today.  Lab Testing:  **If you had lab testing today and your results are reassuring or normal they will be mailed to you or sent through Online-OR within 7 days.   **If the lab tests need quick action we will call you with the results.  **If you are having labs done on a different day, please call [phone] to schedule at [facility] or [phone] for other [facility]patient Lab locations. Labs do not offer walk-in appointments.  The phone number we will call with results is # [phone] (home) . If this is not the best number please call our clinic and change the number.  Medication Refills:  If you need any refills please call your pharmacy and they will contact us.   If you need to  your refill at a new pharmacy, please " contact the new pharmacy directly. The new pharmacy will help you get your medications transferred faster.   Scheduling:  If you have any concerns about today's visit or wish to schedule another appointment please call our office during normal business hours [phone] (8-5:00 M-F). If you can no longer make a scheduled visit, please cancel via [employer] or call us to cancel.   If a referral was made to an [facility] specialty provider and you do not get a call from central scheduling, please refer to directions on your visit summary or call our office during normal business hours for assistance.   If a Mammogram was ordered for you at the Breast Center call [phone] to schedule or change your appointment.  If you had an XRay/CT/Ultrasound/MRI ordered the number is [phone] to schedule or change your radiology appointment.   [facility] has limited ultrasound appointments available on Wednesdays, if you would like your ultrasound at [facility], please call [phone] to schedule.   Medical Concerns:  If you have urgent medical concerns please call [phone] at any time of the day.    Rama Hernandez, DO

## 2024-01-03 ENCOUNTER — TELEPHONE (OUTPATIENT)
Dept: FAMILY MEDICINE | Facility: CLINIC | Age: 29
End: 2024-01-03
Payer: COMMERCIAL

## 2024-01-03 NOTE — TELEPHONE ENCOUNTER
Prior Authorization Retail Medication Request    Medication/Dose:     testosterone cypionate (DEPOTESTOSTERONE) 200 MG/ML injection     Diagnosis and ICD code (if different than what is on RX):    New/renewal/insurance change PA/secondary ins. PA:  Previously Tried and Failed:    Rationale:      Insurance   Primary: [facility]   Insurance ID:  [id number]     Secondary (if applicable):  Insurance ID:      Pharmacy Information (if different than what is on RX)  Name:    Phone:    Fax:

## 2024-01-05 NOTE — TELEPHONE ENCOUNTER
Central Prior Authorization Team   Phone: [phone]    PA Initiation    Medication: TESTOSTERONE CYPIONATE 200 MG/ML IM SOLN  Insurance Company: [employer] - Phone [phone] Fax [phone]  Pharmacy Filling the Rx: [id number] - [place], [place] - [address]  Filling Pharmacy Phone: [phone]  Filling Pharmacy Fax:    Start Date: 1/5/2024

## 2024-01-05 NOTE — TELEPHONE ENCOUNTER
Prior Authorization Approval    Authorization Effective Date: 12/6/2023  Authorization Expiration Date: 1/4/2025  Medication: testosterone cypionate (DEPOTESTOSTERONE) 200 MG/ML injection-APPROVED  Reference #:     Insurance Company: [employer] - Phone [phone] Fax [phone]  Which Pharmacy is filling the prescription (Not needed for infusion/clinic administered): [id number] - [place], [place] - [address] AT [address]  Pharmacy Notified: Yes  Patient Notified: Instructed pharmacy to notify patient when script is ready to /ship.

## 2024-04-25 DIAGNOSIS — F64.9 GENDER DYSPHORIA: ICD-10-CM

## 2024-04-25 RX ORDER — TESTOSTERONE CYPIONATE 200 MG/ML
INJECTION, SOLUTION INTRAMUSCULAR
Qty: 4 ML | Refills: 2 | Status: SHIPPED | OUTPATIENT
Start: 2024-04-25 | End: 2024-07-22

## 2024-04-25 NOTE — TELEPHONE ENCOUNTER
"Request for medication refill:  testosterone cypionate (DEPOTESTOSTERONE) 200 MG/ML injection     Providers if patient needs an appointment and you are willing to give a one month supply please refill for one month and  send a letter/MyChart using \".SMILLIMITEDREFILL\" .smillimited and route chart to \"P SMI \" (Giving one month refill in non controlled medications is strongly recommended before denial)    If refill has been denied, meaning absolutely no refills without visit, please complete the smart phrase \".smirxrefuse\" and route it to the \"P SMI MED REFILLS\"  pool to inform the patient and the pharmacy.    Estefania Hale, CMA      "

## 2024-06-14 NOTE — PROGRESS NOTES
I did not personally see the patient but I have reviewed and agree with the assessment and plan as documented in this note.  Zuri Rudd, PhD -- Supervisor   Licensed Psychologist        Large Joint Aspiration/Injection: R supra patellar bursa    Date/Time: 6/14/2024 11:20 AM    Performed by: Yonny Chase MD  Authorized by: Yonny Chase MD    Consent Done?:  Yes (Verbal)  Indications:  Arthritis, pain and joint swelling  Site marked: the procedure site was marked    Timeout: prior to procedure the correct patient, procedure, and site was verified    Prep: patient was prepped and draped in usual sterile fashion    Local anesthetic:  Bupivacaine 0.5% without epinephrine and lidocaine 1% without epinephrine    Details:  Needle Size:  18 G  Ultrasonic Guidance for needle placement?: Yes    Images are saved and documented.  Approach:  Anterolateral  Location:  Knee  Site:  R supra patellar bursa  Medications:  40 mg triamcinolone acetonide 40 mg/mL  Aspirate amount (mL):  15  Aspirate:  Blood-tinged  Patient tolerance:  Patient tolerated the procedure well with no immediate complications     Ultrasound guidance was used for needle localization. Images were saved and stored for documentation. The appropriate structures were visualized. Dynamic visualization of the needle was continuous throughout the procedures and maintained good position.

## 2024-07-14 ENCOUNTER — HEALTH MAINTENANCE LETTER (OUTPATIENT)
Age: 29
End: 2024-07-14

## 2024-11-13 ASSESSMENT — ANXIETY QUESTIONNAIRES
4. TROUBLE RELAXING: SEVERAL DAYS
GAD7 TOTAL SCORE: 8
3. WORRYING TOO MUCH ABOUT DIFFERENT THINGS: SEVERAL DAYS
IF YOU CHECKED OFF ANY PROBLEMS ON THIS QUESTIONNAIRE, HOW DIFFICULT HAVE THESE PROBLEMS MADE IT FOR YOU TO DO YOUR WORK, TAKE CARE OF THINGS AT HOME, OR GET ALONG WITH OTHER PEOPLE: SOMEWHAT DIFFICULT
8. IF YOU CHECKED OFF ANY PROBLEMS, HOW DIFFICULT HAVE THESE MADE IT FOR YOU TO DO YOUR WORK, TAKE CARE OF THINGS AT HOME, OR GET ALONG WITH OTHER PEOPLE?: SOMEWHAT DIFFICULT
7. FEELING AFRAID AS IF SOMETHING AWFUL MIGHT HAPPEN: SEVERAL DAYS
GAD7 TOTAL SCORE: 8
2. NOT BEING ABLE TO STOP OR CONTROL WORRYING: SEVERAL DAYS
GAD7 TOTAL SCORE: 8
1. FEELING NERVOUS, ANXIOUS, OR ON EDGE: MORE THAN HALF THE DAYS
5. BEING SO RESTLESS THAT IT IS HARD TO SIT STILL: SEVERAL DAYS
7. FEELING AFRAID AS IF SOMETHING AWFUL MIGHT HAPPEN: SEVERAL DAYS
6. BECOMING EASILY ANNOYED OR IRRITABLE: SEVERAL DAYS

## 2024-11-15 ENCOUNTER — OFFICE VISIT (OUTPATIENT)
Dept: FAMILY MEDICINE | Facility: CLINIC | Age: 29
End: 2024-11-15
Payer: COMMERCIAL

## 2024-11-15 VITALS
HEIGHT: 64 IN | SYSTOLIC BLOOD PRESSURE: 107 MMHG | TEMPERATURE: 98.2 F | DIASTOLIC BLOOD PRESSURE: 73 MMHG | OXYGEN SATURATION: 99 % | BODY MASS INDEX: 20.83 KG/M2 | HEART RATE: 58 BPM | RESPIRATION RATE: 14 BRPM | WEIGHT: 122 LBS

## 2024-11-15 DIAGNOSIS — F84.5 ASPERGER'S SYNDROME: ICD-10-CM

## 2024-11-15 DIAGNOSIS — F64.9 GENDER DYSPHORIA: ICD-10-CM

## 2024-11-15 DIAGNOSIS — F41.9 ANXIETY: ICD-10-CM

## 2024-11-15 DIAGNOSIS — F33.1 MODERATE RECURRENT MAJOR DEPRESSION (H): ICD-10-CM

## 2024-11-15 DIAGNOSIS — Z12.4 CERVICAL CANCER SCREENING: ICD-10-CM

## 2024-11-15 DIAGNOSIS — R45.851 SUICIDE IDEATION: ICD-10-CM

## 2024-11-15 DIAGNOSIS — Z11.59 NEED FOR HEPATITIS C SCREENING TEST: ICD-10-CM

## 2024-11-15 DIAGNOSIS — Z11.4 SCREENING FOR HIV (HUMAN IMMUNODEFICIENCY VIRUS): Primary | ICD-10-CM

## 2024-11-15 LAB
ALBUMIN SERPL BCG-MCNC: 4.4 G/DL (ref 3.5–5.2)
ALP SERPL-CCNC: 97 U/L (ref 40–150)
ALT SERPL W P-5'-P-CCNC: 7 U/L (ref 0–70)
ANION GAP SERPL CALCULATED.3IONS-SCNC: 5 MMOL/L (ref 7–15)
AST SERPL W P-5'-P-CCNC: 12 U/L (ref 0–45)
BILIRUB SERPL-MCNC: 0.6 MG/DL
BUN SERPL-MCNC: 9.8 MG/DL (ref 6–20)
CALCIUM SERPL-MCNC: 9.3 MG/DL (ref 8.8–10.4)
CHLORIDE SERPL-SCNC: 104 MMOL/L (ref 98–107)
CHOLEST SERPL-MCNC: 179 MG/DL
CREAT SERPL-MCNC: 0.89 MG/DL (ref 0.51–1.17)
EGFRCR SERPLBLD CKD-EPI 2021: 90 ML/MIN/1.73M2
ESTRADIOL SERPL-MCNC: 41 PG/ML
FASTING STATUS PATIENT QL REPORTED: ABNORMAL
FASTING STATUS PATIENT QL REPORTED: ABNORMAL
GLUCOSE SERPL-MCNC: 87 MG/DL (ref 70–99)
HCO3 SERPL-SCNC: 30 MMOL/L (ref 22–29)
HDLC SERPL-MCNC: 43 MG/DL
HGB BLD-MCNC: 16.1 G/DL (ref 11.7–17.7)
LDLC SERPL CALC-MCNC: 120 MG/DL
NONHDLC SERPL-MCNC: 136 MG/DL
POTASSIUM SERPL-SCNC: 4.7 MMOL/L (ref 3.4–5.3)
PROT SERPL-MCNC: 7.1 G/DL (ref 6.4–8.3)
SODIUM SERPL-SCNC: 139 MMOL/L (ref 135–145)
TRIGL SERPL-MCNC: 82 MG/DL

## 2024-11-15 RX ORDER — SYRINGE, DISPOSABLE, 1 ML
SYRINGE, EMPTY DISPOSABLE MISCELLANEOUS
Qty: 50 EACH | Refills: 3 | Status: SHIPPED | OUTPATIENT
Start: 2024-11-15

## 2024-11-15 RX ORDER — ACETAMINOPHEN 500 MG
1000 TABLET ORAL EVERY 6 HOURS PRN
COMMUNITY
Start: 2024-11-15

## 2024-11-15 RX ORDER — NEEDLES, DISPOSABLE 25GX5/8"
NEEDLE, DISPOSABLE MISCELLANEOUS
Qty: 50 EACH | Refills: 3 | Status: SHIPPED | OUTPATIENT
Start: 2024-11-15

## 2024-11-15 RX ORDER — TESTOSTERONE CYPIONATE 200 MG/ML
INJECTION, SOLUTION INTRAMUSCULAR
Qty: 4 ML | Refills: 0 | Status: SHIPPED | OUTPATIENT
Start: 2024-11-15

## 2024-11-15 ASSESSMENT — COLUMBIA-SUICIDE SEVERITY RATING SCALE - C-SSRS
5. IN THE PAST MONTH, HAVE YOU STARTED TO WORK OUT OR WORKED OUT THE DETAILS OF HOW TO KILL YOURSELF? DO YOU INTEND TO CARRY OUT THIS PLAN?: NO
6. HAVE YOU EVER DONE ANYTHING, STARTED TO DO ANYTHING, OR PREPARED TO DO ANYTHING TO END YOUR LIFE?: NO
1. WITHIN THE PAST MONTH, HAVE YOU WISHED YOU WERE DEAD OR WISHED YOU COULD GO TO SLEEP AND NOT WAKE UP?: YES
2. IN THE PAST MONTH, HAVE YOU ACTUALLY HAD ANY THOUGHTS OF KILLING YOURSELF?: YES
4. IN THE PAST MONTH, HAVE YOU HAD THESE THOUGHTS AND HAD SOME INTENTION OF ACTING ON THEM?: NO
3. IN THE PAST MONTH, HAVE YOU BEEN THINKING ABOUT HOW YOU MIGHT KILL YOURSELF?: YES

## 2024-11-15 ASSESSMENT — PATIENT HEALTH QUESTIONNAIRE - PHQ9
10. IF YOU CHECKED OFF ANY PROBLEMS, HOW DIFFICULT HAVE THESE PROBLEMS MADE IT FOR YOU TO DO YOUR WORK, TAKE CARE OF THINGS AT HOME, OR GET ALONG WITH OTHER PEOPLE: SOMEWHAT DIFFICULT
SUM OF ALL RESPONSES TO PHQ QUESTIONS 1-9: 11
SUM OF ALL RESPONSES TO PHQ QUESTIONS 1-9: 11

## 2024-11-15 NOTE — PROGRESS NOTES
"Answers submitted by the patient for this visit:  Patient Health Questionnaire (Submitted on 11/15/2024)  If you checked off any problems, how difficult have these problems made it for you to do your work, take care of things at home, or get along with other people?: Somewhat difficult  PHQ9 TOTAL SCORE: 11  Patient Health Questionnaire (G7) (Submitted on 11/13/2024)  GWEN 7 TOTAL SCORE: 8    Assessment & Plan     Gender dysphoria  Stable, requires yearly monitoring of hormones. Patient would like to obtain labs today. Refills provided.   - testosterone cypionate (DEPOTESTOSTERONE) 200 MG/ML injection; INJECT 0.3ML UNDER THE SKIN ONCE WEEKLY. DISCARD VIAL AFTER EACH USE  - Needle, Disp, (BD DISP NEEDLES) 25G X 5/8\" MISC; USE TO INJECT TESOSTERONE WEEKLY  - needle, disp, (BD HYPODERMIC NEEDLE) 18G X 1\" MISC; USE FOR WEEKLY TESTOSTERONE INJECTION  - syringe, disposable, (B-D SYRINGE LUER-TOMY) 1 ML MISC; Use with injections once a week  - Lipid panel reflex to direct LDL Non-fasting; Future  - Comprehensive metabolic panel; Future  - Hemoglobin; Future  - Testosterone total; Future  - Estradiol; Future  - Lipid panel reflex to direct LDL Non-fasting  - Comprehensive metabolic panel  - Hemoglobin  - Testosterone total  - Estradiol    Screening for HIV (human immunodeficiency virus)  Need for hepatitis C screening test  Cervical cancer screening  Can address at preventative care visit    Moderate recurrent major depression (H)  Asperger's syndrome  Anxiety  Suicide ideation  No current medication or therapist, mental health worse after election results. Limited social supports on exam. Endorsing active SI with plan on exam. However plan is not feasible (obtaining cyanide for lethal ingestion). Warm handoff to , appreciate input. Referred back to PCP and Crisis resources in [place].          Depression Screening Follow Up        11/15/2024     7:53 AM   PHQ   PHQ-9 Total Score 11    Q9: Thoughts of better off dead/self-harm " past 2 weeks Several days    F/U: Thoughts of suicide or self-harm Yes    F/U: Self harm-plan Yes    F/U: Self-harm action No    F/U: Safety concerns No        Patient-reported         11/15/2024     7:53 AM   Last PHQ-9   1.  Little interest or pleasure in doing things 2    2.  Feeling down, depressed, or hopeless 2    3.  Trouble falling or staying asleep, or sleeping too much 1    4.  Feeling tired or having little energy 1    5.  Poor appetite or overeating 0    6.  Feeling bad about yourself 1    7.  Trouble concentrating 2    8.  Moving slowly or restless 1    Q9: Thoughts of better off dead/self-harm past 2 weeks 1    PHQ-9 Total Score 11    In the past two weeks have you had thoughts of suicide or self harm? Yes    Do you have concerns about your personal safety or the safety of others? No    In the past 2 weeks have you thought about a plan or had intention to harm yourself? Yes    In the past 2 weeks have you acted on these thoughts in any way? No        Patient-reported               11/15/2024    12:13 PM   C-SSRS (Brief [place])   Within the last month, have you wished you were dead or wished you could go to sleep and not wake up? Yes   Within the last month, have you had any actual thoughts of killing yourself? Yes   Within the last month, have you been thinking about how you might do this? Yes   Within the last month, have you had these thoughts and had some intention of acting on them? No   Within the last month, have you started to work out or worked out the details of how to kill yourself with the intent to carry out this plan? No   Within the last month, have you ever done anything, started to do anything, or prepared to do anything to end your life? No       Follow Up Actions Taken  Consult with [facility], monitored patient safety and notified provider.  Patient to follow up with PCP.  Clinic staff to schedule appointment if able.  Crisis resources in [place]     Discussed the following ways the patient  "can remain in a safe environment:  be around others    The longitudinal plan of care for the diagnosis(es)/condition(s) as documented were addressed during this visit. Due to the added complexity in care, I will continue to support Isaiah in the subsequent management and with ongoing continuity of care.    Return in about 1 week (around 11/22/2024).    Subjective   Isaiah is a 29 year old, presenting for the following health issues:  Follow Up and Recheck Medication    HPI   Has not seen a gender specific therapist since 2021, is  Hospitalized in 2015 - threatening to kill himself, but no attempts   No prior suicide attempts   Currently having active SI with plan   Plan is to use cyanide, but does not have access to this   Limited social supports   Does not want to consider therapy/medication at this time.   Recent election has caused increase suicidal thoughts     Here for HRT labs   Stable on meds, no side effects  Happy with changes, no undesirable side effects  Shot day: Thursday       Objective    /73 (BP Location: Left arm, Patient Position: Sitting, Cuff Size: Adult Regular)   Pulse 58   Temp 98.2  F (36.8  C) (Oral)   Resp 14   Ht 1.626 m (5' 4\")   Wt 55.3 kg (122 lb)   SpO2 99%   BMI 20.94 kg/m    Body mass index is 20.94 kg/m .  Physical Exam  Vitals reviewed.   Constitutional:       General: He is not in acute distress.     Appearance: Normal appearance. He is not ill-appearing.   HENT:      Head: Normocephalic and atraumatic.   Eyes:      Conjunctiva/sclera: Conjunctivae normal.   Cardiovascular:      Rate and Rhythm: Normal rate.   Pulmonary:      Effort: Pulmonary effort is normal.   Neurological:      General: No focal deficit present.      Mental Status: He is alert.   Psychiatric:         Attention and Perception: Attention normal.         Mood and Affect: Affect is blunt.         Speech: Speech normal.         Behavior: Behavior normal.         Thought Content: Thought content includes " suicidal ideation. Thought content does not include homicidal ideation. Thought content includes suicidal plan. Thought content does not include homicidal plan.         Cognition and Memory: Cognition normal.                        1/19/2021     3:42 PM 6/9/2022     1:18 PM 11/15/2024     7:53 AM   PHQ   PHQ-9 Total Score 8 3 11    Q9: Thoughts of better off dead/self-harm past 2 weeks Not at all Not at all  Several days    F/U: Thoughts of suicide or self-harm   Yes    F/U: Self harm-plan   Yes    F/U: Self-harm action   No    F/U: Safety concerns   No        Patient-reported       Signed Electronically by: Aura Schmidt DO

## 2024-11-15 NOTE — PROGRESS NOTES
Preceptor Attestation:    I discussed the patient with the resident and evaluated the patient in person. I have verified the content of the note, which accurately reflects my assessment of the patient and the plan of care.   Supervising Physician:  Chandra Vázquez MD, MD.

## 2024-11-15 NOTE — PATIENT INSTRUCTIONS
"Patient Education   Here is the plan from today's visit    1. Gender dysphoria    - testosterone cypionate (DEPOTESTOSTERONE) 200 MG/ML injection; INJECT 0.3ML UNDER THE SKIN ONCE WEEKLY. DISCARD VIAL AFTER EACH USE  Dispense: 4 mL; Refill: 0  - Needle, Disp, (BD DISP NEEDLES) 25G X 5/8\" MISC; USE TO INJECT TESOSTERONE WEEKLY  Dispense: 50 each; Refill: 3  - needle, disp, (BD HYPODERMIC NEEDLE) 18G X 1\" MISC; USE FOR WEEKLY TESTOSTERONE INJECTION  Dispense: 50 each; Refill: 3  - syringe, disposable, (B-D SYRINGE LUER-TOMY) 1 ML MISC; Use with injections once a week  Dispense: 50 each; Refill: 3  - Lipid panel reflex to direct LDL Non-fasting; Future  - Comprehensive metabolic panel; Future  - Hemoglobin; Future  - Testosterone total; Future  - Estradiol; Future  - Lipid panel reflex to direct LDL Non-fasting  - Comprehensive metabolic panel  - Hemoglobin  - Testosterone total  - Estradiol    2. Screening for HIV (human immunodeficiency virus) (Primary)  3. Need for hepatitis C screening test  4. Cervical cancer screening  Will discuss at preventative care vist     Your emotional health is important to us!  If you feel that you may hurt yourself or others, please seek help through the hospital ER, or 9-1-1.      The National Suicide Prevention Lifeline at  988 can be reached 24/7 by call, text, or online chat.  Trans Lifeline can be reached at 532-025-0195.   For LGBTQ+ the [employer] Project Lifeline can be reached at 1-680.331.2349.  [employer] Program: Text \"start\" to 311 028    Please call or return to clinic if your symptoms don't go away.    Follow up plan  Return in about 1 week (around 11/22/2024).    Thank you for coming to [facility] today.  Lab Testing:  **If you had lab testing today and your results are reassuring or normal they will be mailed to you or sent through [employer] within 7 days.   **If the lab tests need quick action we will call you with the results.  **If you are having labs done on a different " day, please call [phone] to schedule at [facility] or [phone] for other [facility]patient Lab locations. Labs do not offer walk-in appointments.  The phone number we will call with results is # [phone] (home) . If this is not the best number please call our clinic and change the number.  Medication Refills:  If you need any refills please call your pharmacy and they will contact us.   If you need to  your refill at a new pharmacy, please contact the new pharmacy directly. The new pharmacy will help you get your medications transferred faster.   Scheduling:  If you have any concerns about today's visit or wish to schedule another appointment please call our office during normal business hours [phone] (8-5:00 M-F). If you can no longer make a scheduled visit, please cancel via [employer] or call us to cancel.   If a referral was made to an [facility] specialty provider and you do not get a call from central scheduling, please refer to directions on your visit summary or call our office during normal business hours for assistance.   If a Mammogram was ordered for you at the Breast Center call [phone] to schedule or change your appointment.  If you had an XRay/CT/Ultrasound/MRI ordered the number is [phone] to schedule or change your radiology appointment.   [facility] has limited ultrasound appointments available on Wednesdays, if you would like your ultrasound at [facility], please call [phone] to schedule.   Medical Concerns:  If you have urgent medical concerns please call [phone] at any time of the day.    Aura Schmidt, DO

## 2024-11-15 NOTE — PROGRESS NOTES
Behavioral Health Consult Note:  Was asked by Dr. Schmidt to see patient to assess suicidal thoughts that were identified during this visit.      Patient Risk Assessment:  Today Isaiah reported that he has chronic suicidal thoughts that intensified after the election last week.  He did google information on cyanide to see if it could be purchased over the internet.  Did not purchase any. States he did this out of curiosity, but not that he actually intends to harm himself.  Denies any intent to harm himself or any further steps taken to implement a plan.  No history of suicide attempts.  Lives with his parents. He has had therapists in the past, the last time was 2021.  Some ambivalence about connecting with another therapist because there was a time when there was a lot of turnover with the therapists he was seeing and this was quite frustrating.  Will think about a therapy referral.  Has also been on medications in the past, but is unsure if he is interested in any medications at this time.  Future oriented - discussing upcoming work shifts.  Isaiah was provided with the phone number for emergency mental health services and was encouraged to call these local crisis numbers, 911, or visit a local emergency room if thoughts of suicide or homicide were to arise and/or if he were to be in acute distress. The patient agreed to utilize these services as indicated if the need were to arise.  He agreed to follow up with his PCP in a week to discuss some of these resources further.

## 2024-11-19 LAB — TESTOST SERPL-MCNC: 783 NG/DL (ref 8–950)

## 2024-12-10 ENCOUNTER — OFFICE VISIT (OUTPATIENT)
Dept: FAMILY MEDICINE | Facility: CLINIC | Age: 29
End: 2024-12-10
Payer: COMMERCIAL

## 2024-12-10 VITALS
DIASTOLIC BLOOD PRESSURE: 74 MMHG | SYSTOLIC BLOOD PRESSURE: 111 MMHG | HEART RATE: 69 BPM | OXYGEN SATURATION: 98 % | TEMPERATURE: 97.5 F | RESPIRATION RATE: 16 BRPM | BODY MASS INDEX: 20.94 KG/M2 | HEIGHT: 64 IN

## 2024-12-10 DIAGNOSIS — F33.1 MODERATE RECURRENT MAJOR DEPRESSION (H): ICD-10-CM

## 2024-12-10 DIAGNOSIS — F64.0 TRANSSEXUALISM: Primary | ICD-10-CM

## 2024-12-10 PROCEDURE — G2211 COMPLEX E/M VISIT ADD ON: HCPCS

## 2024-12-10 PROCEDURE — 99214 OFFICE O/P EST MOD 30 MIN: CPT | Mod: GC

## 2024-12-10 NOTE — PROGRESS NOTES
"  Assessment & Plan   Gender dysphoria in adolescent and adult  Discussed the results from the HRT labs that were drawn on 11/20. All wnl expect for LDL elevated to 120. Discussed lifestyle modification including diet and exercise that can improve LDL and heart health long term.    Moderate recurrent major depression (H)  Patient's mental health today is slightly improved from previous visit; however, mood remains low. He describes feeling passive suicidal ideation, however does not have a plan. He says comments like that he would \"not get out of the way if a car swerved and would hit him\". He denies any current plans for suicide. He describes having conflict with his parents due to differing world views and that this contributes to his worsening mental health. He also mentioned that he would not go to the ED if he was suicidal due to financial concerns. Counseling was given that the most important thing is his life, and there are social and financial workers that could help cover emergency visit. Referral to therapy and prescription for medication was offered and recommended to the patient, however, he declined citing that his insurance will change at the start of the year, and he doesn't know what will be covered. The patient was encouraged to return to the clinic in 1 month for further discussion once their insurance is settled. Patient was also instructed to return to clinic or go to the ED if he felt that his mental health was worsening towards suicidally. Crisis resources were included in AVS.    Return in about 1 month (around 1/10/2025) for Follow up.    Diagnosis or treatment significantly limited by social determinants of health - member of LGBTQ+ community, chronic depression.    The longitudinal plan of care for the diagnosis(es)/condition(s) as documented were addressed during this visit. Due to the added complexity in care, I will continue to support Isaiah in the subsequent management and with ongoing " "continuity of care.    Subjective   Isaiah is a 29 year old, presenting for the following health issues:  Clinic Care Coordination - Follow-up (Blood work from last appointment )    HPI   Isaiah is here for follow up on HRT labs. He has no new concerns or questions regarding his labs.  On further questioning Isaiah mentioned that his mental health was still not ideal. He is not actively suicidal, however describes passive suicidally. Describes having arguments with parents due to differing world views.     Review of Systems  Constitutional, neuro, ENT, endocrine, pulmonary, cardiac, gastrointestinal, genitourinary, musculoskeletal, integument and psychiatric systems are negative, except as otherwise noted.      Objective    /74   Pulse 69   Temp 97.5  F (36.4  C) (Tympanic)   Resp 16   Ht 1.626 m (5' 4\")   SpO2 98%   BMI 20.94 kg/m    Body mass index is 20.94 kg/m .  Physical Exam  Vitals reviewed.   Constitutional:       Appearance: Normal appearance.   HENT:      Head: Normocephalic and atraumatic.   Eyes:      Extraocular Movements: Extraocular movements intact.      Conjunctiva/sclera: Conjunctivae normal.   Cardiovascular:      Rate and Rhythm: Normal rate and regular rhythm.      Heart sounds: Normal heart sounds.   Musculoskeletal:         General: Normal range of motion.      Cervical back: Normal range of motion and neck supple.   Skin:     General: Skin is warm and dry.   Neurological:      General: No focal deficit present.      Mental Status: He is alert and oriented to person, place, and time.   Psychiatric:         Mood and Affect: Affect is flat.        Jacinto Lopez MS-3    I was present with the medical student who participated in the service and in the documentation of this note. I have verified the history and personally performed the physical exam and medical decision making, and have verified the content of the note, which accurately reflects my assessment of the patient and the plan " of care.     Rama Hernandez DO, MPH

## 2024-12-10 NOTE — PATIENT INSTRUCTIONS
Your cholesterol is high. Because you do not have diabetes, we can manage the cholesterol with lifestyle changes. This includes increased exercise, which can include brisk walking, dancing, or yoga. Cardio would be very good for your heart.     Additionally, decreasing saturated and trans fats such as red meat, fried foods, margarine, butter, dairy products will help. Olive oil is a good replacement, and white meat will be health if you eat meat. Seeds, nuts (almonds, walnuts, peanuts), avocadoes contain healthy fats. Eating more fiber will also help! This would include more fruits and vegetables, whole grains such as oatmeal and brown rice, legumes, and beans.     Patient Education   Here is the plan from today's visit    1. Gender dysphoria in adolescent and adult (Primary)    2. Moderate recurrent major depression (H)    Please call or return to clinic if your symptoms don't go away.    Follow up plan  Return in about 1 month (around 1/10/2025) for Follow up.    Thank you for coming to [facility] today.  Lab Testing:  **If you had lab testing today and your results are reassuring or normal they will be mailed to you or sent through Jammcard within 7 days.   **If the lab tests need quick action we will call you with the results.  **If you are having labs done on a different day, please call [phone] to schedule at [facility] or [phone] for other [facility] locations. Labs do not offer walk-in appointments.  The phone number we will call with results is # [phone] (home) . If this is not the best number please call our clinic and change the number.  Medication Refills:  If you need any refills please call your pharmacy and they will contact us.   If you need to  your refill at a new pharmacy, please contact the new pharmacy directly. The new pharmacy will help you get your medications transferred faster.   Scheduling:  If you have any concerns about today's visit or  wish to schedule another appointment please call our office during normal business hours [phone] (8-5:00 M-F). If you can no longer make a scheduled visit, please cancel via Dittit or call us to cancel.   If a referral was made to an [facility] specialty provider and you do not get a call from central scheduling, please refer to directions on your visit summary or call our office during normal business hours for assistance.   If a Mammogram was ordered for you at the [facility] call [phone] to schedule or change your appointment.  If you had an XRay/CT/Ultrasound/MRI ordered the number is [phone] to schedule or change your radiology appointment.   [facility] has limited ultrasound appointments available on Wednesdays, if you would like your ultrasound at [facility], please call [phone] to schedule.   Medical Concerns:  If you have urgent medical concerns please call [phone] at any time of the day.    Rama Hernandez, DO     CRISIS LINES/SERVICES  If in Immediate danger please go to the ER and/or call 9-1-1  Feeling overwhelmed and just need a supportive person to talk through a struggling time?   Toll Free [phone] or text  Support  to 65396 (hours 12 PM - 10 PM CST)  The [place] Warmline provides a savr-zo-gzyx approach to mental health recovery, support and wellness. Calls are answered by professionally trained Certified Peer Specialists, who have first hand experience living with a mental health condition. (hours 12 PM - 10 PM CST)    Do you feel like you might be in crisis and potentially need professional services?   National Crisis Lines:  988 - National Suicide Prevention Lifeline  7-611-OKMCIBA (0-623-310-3895) - www.InstrumentLifeline.DigitalScirocco  9-355-491-TALK (6002) - www.suicidepreventionlifeline.org  [place]:  Crisis Text Line: Text [place] to 567584. Free support at your fingertips 24/7  People who text [place] to 667662 will be connected with a counselor. Crisis Text Line is  available 24 hours a day, seven days a week.  [facility] Multilingual Crisis Line:  [phone]  [facility]:  COPE - Adult (psychiatric crisis, but cannot transport self): [phone]   COPE - Child: [phone]  Acute Psychiatric Services - [facility] (24 hour crisis walk-in and crisis line): [phone]  [address] [place]  [facility] (Emergency Psychiatric Evaluation): [phone]  [facility]:  [facility] Adult Crisis Line (crisis counseling and walk-in urgent care mental health): [phone]   Adult Residential Crisis Centers:   [facility] operates two Adult Residential Crisis Centers in the area: [facility] and [facility]  These facilities are a step below in-patient hospital care, providing a three to ten-day stay for individuals who are at a moderate but not a high risk for self-harm. The crisis centers and other [facility] programs can be reached through their central screening at [phone].  Domestic Violence:  [place] Domestic Violence Crisis Line (24-hour [place] crisis line) 1-349.950.6075    If in Immediate danger please go to the ER and/or call 9-1-1
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
## 2025-07-19 ENCOUNTER — HEALTH MAINTENANCE LETTER (OUTPATIENT)
Age: 30
End: 2025-07-19

## (undated) DEVICE — LINEN TOWEL PACK X5 5464

## (undated) DEVICE — BAG URIMETER FOLEY KIT W/16FR FOLEY

## (undated) DEVICE — ESU ELEC BLADE HEX-LOCKING 2.5" E1450X

## (undated) DEVICE — PEN MARKING SKIN TYCO DEVON DUAL TIP 31145868

## (undated) DEVICE — BNDG ELASTIC 6"X5YDS UNSTERILE 6611-60

## (undated) DEVICE — SOL ADH LIQUID BENZOIN SWAB 0.6ML C1544

## (undated) DEVICE — DRSG KERLIX 4 1/2"X4YDS ROLL 6730

## (undated) DEVICE — BNDG ELASTIC 6" DBL LENGTH UNSTERILE 6611-16

## (undated) DEVICE — STPL SKIN 35W APPOSE 8886803712

## (undated) DEVICE — SPONGE LAP 18X18" X8435

## (undated) DEVICE — SU SILK 2-0 SH 30" K833H

## (undated) DEVICE — ESU GROUND PAD ADULT W/CORD E7507

## (undated) DEVICE — DRAIN JACKSON PRATT RESERVOIR 100ML SU130-1305

## (undated) DEVICE — CLOSURE SYS SKIN PREMIERPRO EXOFINFUSION 4X60CM 3473

## (undated) DEVICE — RX BACITRACIN OINTMENT 14G 0.5OZ 45802-060-01

## (undated) DEVICE — SU VICRYL 3-0 FS-1 27" J442H

## (undated) DEVICE — GLOVE PROTEXIS MICRO 6.5  2D73PM65

## (undated) DEVICE — BLADE KNIFE SURG 10 371110

## (undated) DEVICE — SOL NACL 0.9% IRRIG 500ML BOTTLE 2F7123

## (undated) DEVICE — SUCTION TIP YANKAUER STR K87

## (undated) DEVICE — DRSG ABDOMINAL 07 1/2X8" 7197D

## (undated) DEVICE — DRAIN JACKSON PRATT 15FR ROUND SU130-1323

## (undated) DEVICE — ESU PENCIL SMOKE EVAC W/ROCKER SWITCH 0703-047-000

## (undated) DEVICE — DRAPE LAP TRANSVERSE 29421

## (undated) DEVICE — PREP CHLORAPREP 26ML TINTED ORANGE  260815

## (undated) DEVICE — SU VICRYL 4-0 PS-2 18" UND J496H

## (undated) DEVICE — SU ETHILON 3-0 PS-1 18" 1663H

## (undated) DEVICE — STRAP KNEE/BODY 31143004

## (undated) DEVICE — PACK MINOR CUSTOM ASC

## (undated) DEVICE — SUCTION MANIFOLD NEPTUNE 2 SYS 1 PORT 702-025-000

## (undated) DEVICE — PAD CHUX UNDERPAD 30X30"

## (undated) RX ORDER — LIDOCAINE HYDROCHLORIDE 20 MG/ML
INJECTION, SOLUTION EPIDURAL; INFILTRATION; INTRACAUDAL; PERINEURAL
Status: DISPENSED
Start: 2021-09-15

## (undated) RX ORDER — ONDANSETRON 2 MG/ML
INJECTION INTRAMUSCULAR; INTRAVENOUS
Status: DISPENSED
Start: 2021-09-15

## (undated) RX ORDER — ACETAMINOPHEN 325 MG/1
TABLET ORAL
Status: DISPENSED
Start: 2021-09-15

## (undated) RX ORDER — DEXAMETHASONE SODIUM PHOSPHATE 4 MG/ML
INJECTION, SOLUTION INTRA-ARTICULAR; INTRALESIONAL; INTRAMUSCULAR; INTRAVENOUS; SOFT TISSUE
Status: DISPENSED
Start: 2021-09-15

## (undated) RX ORDER — HYDROMORPHONE HYDROCHLORIDE 1 MG/ML
INJECTION, SOLUTION INTRAMUSCULAR; INTRAVENOUS; SUBCUTANEOUS
Status: DISPENSED
Start: 2021-09-15

## (undated) RX ORDER — PROPOFOL 10 MG/ML
INJECTION, EMULSION INTRAVENOUS
Status: DISPENSED
Start: 2021-09-15

## (undated) RX ORDER — BUPIVACAINE HYDROCHLORIDE 2.5 MG/ML
INJECTION, SOLUTION EPIDURAL; INFILTRATION; INTRACAUDAL
Status: DISPENSED
Start: 2021-09-15

## (undated) RX ORDER — CEFAZOLIN SODIUM 1 G/3ML
INJECTION, POWDER, FOR SOLUTION INTRAMUSCULAR; INTRAVENOUS
Status: DISPENSED
Start: 2021-09-15

## (undated) RX ORDER — FENTANYL CITRATE 50 UG/ML
INJECTION, SOLUTION INTRAMUSCULAR; INTRAVENOUS
Status: DISPENSED
Start: 2021-09-15